# Patient Record
Sex: FEMALE | Race: BLACK OR AFRICAN AMERICAN | NOT HISPANIC OR LATINO | URBAN - METROPOLITAN AREA
[De-identification: names, ages, dates, MRNs, and addresses within clinical notes are randomized per-mention and may not be internally consistent; named-entity substitution may affect disease eponyms.]

---

## 2022-11-05 ENCOUNTER — EMERGENCY (EMERGENCY)
Facility: HOSPITAL | Age: 67
LOS: 0 days | Discharge: HOME | End: 2022-11-05
Attending: EMERGENCY MEDICINE
Payer: MEDICARE

## 2022-11-05 VITALS
HEART RATE: 65 BPM | SYSTOLIC BLOOD PRESSURE: 105 MMHG | DIASTOLIC BLOOD PRESSURE: 57 MMHG | RESPIRATION RATE: 20 BRPM | OXYGEN SATURATION: 100 %

## 2022-11-05 VITALS
TEMPERATURE: 99 F | OXYGEN SATURATION: 93 % | RESPIRATION RATE: 18 BRPM | DIASTOLIC BLOOD PRESSURE: 64 MMHG | HEART RATE: 64 BPM | SYSTOLIC BLOOD PRESSURE: 109 MMHG

## 2022-11-05 DIAGNOSIS — J44.9 CHRONIC OBSTRUCTIVE PULMONARY DISEASE, UNSPECIFIED: ICD-10-CM

## 2022-11-05 DIAGNOSIS — R07.0 PAIN IN THROAT: ICD-10-CM

## 2022-11-05 DIAGNOSIS — I50.9 HEART FAILURE, UNSPECIFIED: ICD-10-CM

## 2022-11-05 DIAGNOSIS — R07.9 CHEST PAIN, UNSPECIFIED: ICD-10-CM

## 2022-11-05 DIAGNOSIS — I13.0 HYPERTENSIVE HEART AND CHRONIC KIDNEY DISEASE WITH HEART FAILURE AND STAGE 1 THROUGH STAGE 4 CHRONIC KIDNEY DISEASE, OR UNSPECIFIED CHRONIC KIDNEY DISEASE: ICD-10-CM

## 2022-11-05 DIAGNOSIS — R07.89 OTHER CHEST PAIN: ICD-10-CM

## 2022-11-05 DIAGNOSIS — E11.22 TYPE 2 DIABETES MELLITUS WITH DIABETIC CHRONIC KIDNEY DISEASE: ICD-10-CM

## 2022-11-05 DIAGNOSIS — Z95.4 PRESENCE OF OTHER HEART-VALVE REPLACEMENT: ICD-10-CM

## 2022-11-05 DIAGNOSIS — R10.9 UNSPECIFIED ABDOMINAL PAIN: ICD-10-CM

## 2022-11-05 DIAGNOSIS — D63.1 ANEMIA IN CHRONIC KIDNEY DISEASE: ICD-10-CM

## 2022-11-05 DIAGNOSIS — Z99.2 DEPENDENCE ON RENAL DIALYSIS: ICD-10-CM

## 2022-11-05 DIAGNOSIS — K52.9 NONINFECTIVE GASTROENTERITIS AND COLITIS, UNSPECIFIED: ICD-10-CM

## 2022-11-05 DIAGNOSIS — N18.9 CHRONIC KIDNEY DISEASE, UNSPECIFIED: ICD-10-CM

## 2022-11-05 DIAGNOSIS — E78.5 HYPERLIPIDEMIA, UNSPECIFIED: ICD-10-CM

## 2022-11-05 DIAGNOSIS — Z99.81 DEPENDENCE ON SUPPLEMENTAL OXYGEN: ICD-10-CM

## 2022-11-05 LAB
ALBUMIN SERPL ELPH-MCNC: 2.9 G/DL — LOW (ref 3.3–5)
ALP SERPL-CCNC: 61 U/L — SIGNIFICANT CHANGE UP (ref 40–120)
ALT FLD-CCNC: 53 U/L — SIGNIFICANT CHANGE UP (ref 12–78)
ANION GAP SERPL CALC-SCNC: 4 MMOL/L — LOW (ref 5–17)
AST SERPL-CCNC: 57 U/L — HIGH (ref 15–37)
BASOPHILS # BLD AUTO: 0 K/UL — SIGNIFICANT CHANGE UP (ref 0–0.2)
BASOPHILS NFR BLD AUTO: 0 % — SIGNIFICANT CHANGE UP (ref 0–2)
BILIRUB SERPL-MCNC: 0.6 MG/DL — SIGNIFICANT CHANGE UP (ref 0.2–1.2)
BUN SERPL-MCNC: 35 MG/DL — HIGH (ref 7–23)
CALCIUM SERPL-MCNC: 8 MG/DL — LOW (ref 8.5–10.1)
CHLORIDE SERPL-SCNC: 104 MMOL/L — SIGNIFICANT CHANGE UP (ref 96–108)
CO2 SERPL-SCNC: 29 MMOL/L — SIGNIFICANT CHANGE UP (ref 22–31)
CREAT SERPL-MCNC: 4.62 MG/DL — HIGH (ref 0.5–1.3)
EGFR: 10 ML/MIN/1.73M2 — LOW
EOSINOPHIL # BLD AUTO: 0 K/UL — SIGNIFICANT CHANGE UP (ref 0–0.5)
EOSINOPHIL NFR BLD AUTO: 0 % — SIGNIFICANT CHANGE UP (ref 0–6)
GLUCOSE SERPL-MCNC: 175 MG/DL — HIGH (ref 70–99)
HCT VFR BLD CALC: 31 % — LOW (ref 34.5–45)
HGB BLD-MCNC: 9.6 G/DL — LOW (ref 11.5–15.5)
LIDOCAIN IGE QN: 79 U/L — SIGNIFICANT CHANGE UP (ref 73–393)
LYMPHOCYTES # BLD AUTO: 1.62 K/UL — SIGNIFICANT CHANGE UP (ref 1–3.3)
LYMPHOCYTES # BLD AUTO: 19 % — SIGNIFICANT CHANGE UP (ref 13–44)
MAGNESIUM SERPL-MCNC: 2.2 MG/DL — SIGNIFICANT CHANGE UP (ref 1.6–2.6)
MCHC RBC-ENTMCNC: 30.5 PG — SIGNIFICANT CHANGE UP (ref 27–34)
MCHC RBC-ENTMCNC: 31 GM/DL — LOW (ref 32–36)
MCV RBC AUTO: 98.4 FL — SIGNIFICANT CHANGE UP (ref 80–100)
MONOCYTES # BLD AUTO: 0.86 K/UL — SIGNIFICANT CHANGE UP (ref 0–0.9)
MONOCYTES NFR BLD AUTO: 10 % — SIGNIFICANT CHANGE UP (ref 2–14)
NEUTROPHILS # BLD AUTO: 6.07 K/UL — SIGNIFICANT CHANGE UP (ref 1.8–7.4)
NEUTROPHILS NFR BLD AUTO: 64 % — SIGNIFICANT CHANGE UP (ref 43–77)
NRBC # BLD: SIGNIFICANT CHANGE UP /100 WBCS (ref 0–0)
NT-PROBNP SERPL-SCNC: HIGH PG/ML (ref 0–125)
PLATELET # BLD AUTO: 216 K/UL — SIGNIFICANT CHANGE UP (ref 150–400)
POTASSIUM SERPL-MCNC: 4.7 MMOL/L — SIGNIFICANT CHANGE UP (ref 3.5–5.3)
POTASSIUM SERPL-SCNC: 4.7 MMOL/L — SIGNIFICANT CHANGE UP (ref 3.5–5.3)
PROT SERPL-MCNC: 7.7 GM/DL — SIGNIFICANT CHANGE UP (ref 6–8.3)
RBC # BLD: 3.15 M/UL — LOW (ref 3.8–5.2)
RBC # FLD: 19 % — HIGH (ref 10.3–14.5)
SODIUM SERPL-SCNC: 137 MMOL/L — SIGNIFICANT CHANGE UP (ref 135–145)
TROPONIN I, HIGH SENSITIVITY RESULT: 18.27 NG/L — SIGNIFICANT CHANGE UP
TROPONIN I, HIGH SENSITIVITY RESULT: 24.83 NG/L — SIGNIFICANT CHANGE UP
WBC # BLD: 8.55 K/UL — SIGNIFICANT CHANGE UP (ref 3.8–10.5)
WBC # FLD AUTO: 8.55 K/UL — SIGNIFICANT CHANGE UP (ref 3.8–10.5)

## 2022-11-05 PROCEDURE — 36415 COLL VENOUS BLD VENIPUNCTURE: CPT

## 2022-11-05 PROCEDURE — 84484 ASSAY OF TROPONIN QUANT: CPT

## 2022-11-05 PROCEDURE — 93010 ELECTROCARDIOGRAM REPORT: CPT

## 2022-11-05 PROCEDURE — 99285 EMERGENCY DEPT VISIT HI MDM: CPT

## 2022-11-05 PROCEDURE — 83880 ASSAY OF NATRIURETIC PEPTIDE: CPT

## 2022-11-05 PROCEDURE — 71045 X-RAY EXAM CHEST 1 VIEW: CPT

## 2022-11-05 PROCEDURE — 83690 ASSAY OF LIPASE: CPT

## 2022-11-05 PROCEDURE — 71045 X-RAY EXAM CHEST 1 VIEW: CPT | Mod: 26

## 2022-11-05 PROCEDURE — 93005 ELECTROCARDIOGRAM TRACING: CPT

## 2022-11-05 PROCEDURE — 80053 COMPREHEN METABOLIC PANEL: CPT

## 2022-11-05 PROCEDURE — 85025 COMPLETE CBC W/AUTO DIFF WBC: CPT

## 2022-11-05 PROCEDURE — 99285 EMERGENCY DEPT VISIT HI MDM: CPT | Mod: 25

## 2022-11-05 PROCEDURE — 83735 ASSAY OF MAGNESIUM: CPT

## 2022-11-05 RX ORDER — LIDOCAINE 4 G/100G
10 CREAM TOPICAL ONCE
Refills: 0 | Status: COMPLETED | OUTPATIENT
Start: 2022-11-05 | End: 2022-11-05

## 2022-11-05 RX ORDER — FAMOTIDINE 10 MG/ML
20 INJECTION INTRAVENOUS ONCE
Refills: 0 | Status: COMPLETED | OUTPATIENT
Start: 2022-11-05 | End: 2022-11-05

## 2022-11-05 RX ADMIN — Medication 30 MILLILITER(S): at 15:12

## 2022-11-05 RX ADMIN — FAMOTIDINE 20 MILLIGRAM(S): 10 INJECTION INTRAVENOUS at 15:11

## 2022-11-05 RX ADMIN — LIDOCAINE 10 MILLILITER(S): 4 CREAM TOPICAL at 15:12

## 2022-11-05 NOTE — ED ADULT TRIAGE NOTE - CHIEF COMPLAINT QUOTE
Pt arrives to ED from [facility] re[facility] complaining of chest pain. Pt s/p PPM placement three days ago.

## 2022-11-05 NOTE — ED PROVIDER NOTE - NSICDXPASTMEDICALHX_GEN_ALL_CORE_FT
PAST MEDICAL HISTORY:  Asthma with chronic obstructive pulmonary disease (COPD)     CHF (congestive heart failure)     Diabetes     HLD (hyperlipidemia)     HTN (hypertension)

## 2022-11-05 NOTE — ED PROVIDER NOTE - OBJECTIVE STATEMENT
65 yo female w/PMHx of CHF, DM2 on insulin, CKD on dialysis M,W,F, HTN, HLD, COPD on 2L home O2, anemia last transfusion on Wednesday, PPM placement x3days ago presents to the ED [facility]ERT[facility] from [facility] c/o CP. Pt c/o burning throat pain, left sided CP and diffuse burning abd pain. Pt states that the CP is intermittent and feeling like pressure. Pt states that the pain started after eating breakfast and went to [place] ED. Pt has no nausea, vomiting. Pt is lightheaded.

## 2022-11-05 NOTE — ED ADULT NURSE NOTE - NSIMPLEMENTINTERV_GEN_ALL_ED
Implemented All Fall Risk Interventions:  [place] to call system. Call bell, personal items and telephone within reach. Instruct patient to call for assistance. Room bathroom lighting operational. Non-slip footwear when patient is off stretcher. Physically safe environment: no spills, clutter or unnecessary equipment. Stretcher in lowest position, wheels locked, appropriate side rails in place. Provide visual cue, wrist band, yellow gown, etc. Monitor gait and stability. Monitor for mental status changes and reorient to person, place, and time. Review medications for side effects contributing to fall risk. Reinforce activity limits and safety measures with patient and family.

## 2022-11-05 NOTE — ED ADULT NURSE NOTE - OBJECTIVE STATEMENT
Pt BIBEMS c/o chest pain. per EMS pt s/p PPM 3 days ago. Denies chest pain at present. HX HD [id number] last was friday. Permacath noted to right chest wall. Hx COPD and asthma on 2L O2 at home

## 2022-11-05 NOTE — ED ADULT NURSE NOTE - CHIEF COMPLAINT QUOTE
Pt arrives to ED from [facility] rehab complaining of chest pain. Pt s/p PPM placement three days ago.

## 2022-11-05 NOTE — ED PROVIDER NOTE - NS ED ROS FT
Constitutional: No fevers, chills, or sweats.   HEENT: +throat pain   Cardiac: +chest pain, no exertional dyspnea, orthopnea  Respiratory: No shortness of breath, no cough  GI: +abdominal pain, no N/V/D, +constipation   Neuro: No headaches, no neck pain/stiffness, no numbness  All other systems reviewed and are negative unless otherwise stated in the HPI.

## 2022-11-05 NOTE — ED PROVIDER NOTE - PROGRESS NOTE DETAILS
Received signout from Dr. Echevarria. Await second trop. He does not suspect ischemia. Second trop neg. Will d/c with precautions. Marlys FUNEZ

## 2022-11-05 NOTE — ED PROVIDER NOTE - PHYSICAL EXAMINATION
General: AAOx3, NAD  HEENT: NCAT  Cardiac: Normal rate and rhythm, systolic murmur, normal peripheral perfusion  Respiratory: Normal rate and effort. CTAB  GI: Soft, nondistended, epigastric and LLQ ttp   Neuro: No focal deficits. LO equally x4, sensation to light touch intact throughout  MSK: FROMx4, no focal bony tenderness, no peripheral edema  Skin: No rash

## 2022-11-05 NOTE — ED PROVIDER NOTE - CLINICAL SUMMARY MEDICAL DECISION MAKING FREE TEXT BOX
Plan for EKG, labs, CXR, trial of gastrits medication given onset of symptoms after eating today.   NSR with sinus arrythmia at 62 bpm. Normal axis, normal intervals, no acute ischemic changes.

## 2022-11-05 NOTE — ED PROVIDER NOTE - PATIENT PORTAL LINK FT
You can access the FollowMyHealth Patient Portal offered by [facility] by registering at the following website: [email or web address]. By joining [employer]’s FollowMyHealth portal, you will also be able to view your health information using other applications (apps) compatible with our system.

## 2022-12-03 RX ORDER — SODIUM ZIRCONIUM CYCLOSILICATE 10 G/10G
1 POWDER, FOR SUSPENSION ORAL
Qty: 0 | Refills: 0 | DISCHARGE
Start: 2022-12-03

## 2022-12-04 RX ORDER — VANCOMYCIN HCL 1 G
1 VIAL (EA) INTRAVENOUS
Qty: 0 | Refills: 0 | DISCHARGE
Start: 2022-12-04

## 2022-12-05 ENCOUNTER — INPATIENT (INPATIENT)
Facility: HOSPITAL | Age: 67
LOS: 3 days | Discharge: HOME | DRG: 377 | End: 2022-12-09
Attending: HOSPITALIST | Admitting: INTERNAL MEDICINE
Payer: MEDICARE

## 2022-12-05 VITALS
DIASTOLIC BLOOD PRESSURE: 52 MMHG | TEMPERATURE: 98 F | OXYGEN SATURATION: 95 % | WEIGHT: 293 LBS | HEART RATE: 66 BPM | RESPIRATION RATE: 18 BRPM | HEIGHT: 65 IN | SYSTOLIC BLOOD PRESSURE: 97 MMHG

## 2022-12-05 DIAGNOSIS — K92.1 MELENA: ICD-10-CM

## 2022-12-05 PROBLEM — E78.5 HYPERLIPIDEMIA, UNSPECIFIED: Chronic | Status: ACTIVE | Noted: 2022-11-11

## 2022-12-05 PROBLEM — E11.9 TYPE 2 DIABETES MELLITUS WITHOUT COMPLICATIONS: Chronic | Status: ACTIVE | Noted: 2022-11-11

## 2022-12-05 PROBLEM — J44.9 CHRONIC OBSTRUCTIVE PULMONARY DISEASE, UNSPECIFIED: Chronic | Status: ACTIVE | Noted: 2022-11-11

## 2022-12-05 PROBLEM — I10 ESSENTIAL (PRIMARY) HYPERTENSION: Chronic | Status: ACTIVE | Noted: 2022-11-11

## 2022-12-05 PROBLEM — I50.9 HEART FAILURE, UNSPECIFIED: Chronic | Status: ACTIVE | Noted: 2022-11-11

## 2022-12-05 LAB
ALBUMIN SERPL ELPH-MCNC: 2.2 G/DL — LOW (ref 3.3–5)
ALP SERPL-CCNC: 52 U/L — SIGNIFICANT CHANGE UP (ref 40–120)
ALT FLD-CCNC: 24 U/L — SIGNIFICANT CHANGE UP (ref 12–78)
ANION GAP SERPL CALC-SCNC: 3 MMOL/L — LOW (ref 5–17)
APTT BLD: 32.7 SEC — SIGNIFICANT CHANGE UP (ref 27.5–35.5)
AST SERPL-CCNC: 27 U/L — SIGNIFICANT CHANGE UP (ref 15–37)
BASE EXCESS BLDA CALC-SCNC: -2.2 MMOL/L — LOW (ref -2–3)
BASOPHILS # BLD AUTO: 0.02 K/UL — SIGNIFICANT CHANGE UP (ref 0–0.2)
BASOPHILS NFR BLD AUTO: 0.3 % — SIGNIFICANT CHANGE UP (ref 0–2)
BILIRUB SERPL-MCNC: 0.5 MG/DL — SIGNIFICANT CHANGE UP (ref 0.2–1.2)
BLOOD GAS COMMENTS ARTERIAL: SIGNIFICANT CHANGE UP
BUN SERPL-MCNC: 21 MG/DL — SIGNIFICANT CHANGE UP (ref 7–23)
CALCIUM SERPL-MCNC: 8.5 MG/DL — SIGNIFICANT CHANGE UP (ref 8.5–10.1)
CHLORIDE SERPL-SCNC: 103 MMOL/L — SIGNIFICANT CHANGE UP (ref 96–108)
CO2 BLDA-SCNC: 29 MMOL/L — HIGH (ref 19–24)
CO2 SERPL-SCNC: 30 MMOL/L — SIGNIFICANT CHANGE UP (ref 22–31)
CREAT SERPL-MCNC: 3.69 MG/DL — HIGH (ref 0.5–1.3)
EGFR: 13 ML/MIN/1.73M2 — LOW
EOSINOPHIL # BLD AUTO: 0.1 K/UL — SIGNIFICANT CHANGE UP (ref 0–0.5)
EOSINOPHIL NFR BLD AUTO: 1.3 % — SIGNIFICANT CHANGE UP (ref 0–6)
GLUCOSE SERPL-MCNC: 159 MG/DL — HIGH (ref 70–99)
HCO3 BLDA-SCNC: 27 MMOL/L — SIGNIFICANT CHANGE UP (ref 21–28)
HCT VFR BLD CALC: 23.5 % — LOW (ref 34.5–45)
HGB BLD-MCNC: 7.1 G/DL — LOW (ref 11.5–15.5)
IMM GRANULOCYTES NFR BLD AUTO: 0.6 % — SIGNIFICANT CHANGE UP (ref 0–0.9)
INR BLD: 1.09 RATIO — SIGNIFICANT CHANGE UP (ref 0.88–1.16)
LACTATE SERPL-SCNC: 0.5 MMOL/L — LOW (ref 0.7–2)
LYMPHOCYTES # BLD AUTO: 1.22 K/UL — SIGNIFICANT CHANGE UP (ref 1–3.3)
LYMPHOCYTES # BLD AUTO: 15.6 % — SIGNIFICANT CHANGE UP (ref 13–44)
MCHC RBC-ENTMCNC: 30.1 PG — SIGNIFICANT CHANGE UP (ref 27–34)
MCHC RBC-ENTMCNC: 30.2 GM/DL — LOW (ref 32–36)
MCV RBC AUTO: 99.6 FL — SIGNIFICANT CHANGE UP (ref 80–100)
MONOCYTES # BLD AUTO: 0.72 K/UL — SIGNIFICANT CHANGE UP (ref 0–0.9)
MONOCYTES NFR BLD AUTO: 9.2 % — SIGNIFICANT CHANGE UP (ref 2–14)
NEUTROPHILS # BLD AUTO: 5.7 K/UL — SIGNIFICANT CHANGE UP (ref 1.8–7.4)
NEUTROPHILS NFR BLD AUTO: 73 % — SIGNIFICANT CHANGE UP (ref 43–77)
PCO2 BLDA: 68 MMHG — HIGH (ref 32–45)
PH BLDA: 7.21 — LOW (ref 7.35–7.45)
PLATELET # BLD AUTO: 143 K/UL — LOW (ref 150–400)
PO2 BLDA: 122 MMHG — HIGH (ref 83–108)
POTASSIUM SERPL-MCNC: 4 MMOL/L — SIGNIFICANT CHANGE UP (ref 3.5–5.3)
POTASSIUM SERPL-SCNC: 4 MMOL/L — SIGNIFICANT CHANGE UP (ref 3.5–5.3)
PROT SERPL-MCNC: 7.8 GM/DL — SIGNIFICANT CHANGE UP (ref 6–8.3)
PROTHROM AB SERPL-ACNC: 12.6 SEC — SIGNIFICANT CHANGE UP (ref 10.5–13.4)
RAPID RVP RESULT: SIGNIFICANT CHANGE UP
RBC # BLD: 2.36 M/UL — LOW (ref 3.8–5.2)
RBC # FLD: 18.2 % — HIGH (ref 10.3–14.5)
SAO2 % BLDA: 99 % — SIGNIFICANT CHANGE UP (ref 94–98)
SARS-COV-2 RNA SPEC QL NAA+PROBE: SIGNIFICANT CHANGE UP
SODIUM SERPL-SCNC: 136 MMOL/L — SIGNIFICANT CHANGE UP (ref 135–145)
TROPONIN I, HIGH SENSITIVITY RESULT: 32.46 NG/L — SIGNIFICANT CHANGE UP
WBC # BLD: 7.81 K/UL — SIGNIFICANT CHANGE UP (ref 3.8–10.5)
WBC # FLD AUTO: 7.81 K/UL — SIGNIFICANT CHANGE UP (ref 3.8–10.5)

## 2022-12-05 PROCEDURE — 82728 ASSAY OF FERRITIN: CPT

## 2022-12-05 PROCEDURE — 99291 CRITICAL CARE FIRST HOUR: CPT

## 2022-12-05 PROCEDURE — 84443 ASSAY THYROID STIM HORMONE: CPT

## 2022-12-05 PROCEDURE — 71045 X-RAY EXAM CHEST 1 VIEW: CPT | Mod: 26

## 2022-12-05 PROCEDURE — 74176 CT ABD & PELVIS W/O CONTRAST: CPT | Mod: 26,59,MD

## 2022-12-05 PROCEDURE — 97530 THERAPEUTIC ACTIVITIES: CPT | Mod: GP

## 2022-12-05 PROCEDURE — 74177 CT ABD & PELVIS W/CONTRAST: CPT | Mod: 26,MD

## 2022-12-05 PROCEDURE — P9016: CPT

## 2022-12-05 PROCEDURE — 83540 ASSAY OF IRON: CPT

## 2022-12-05 PROCEDURE — G0508: CPT | Mod: 95

## 2022-12-05 PROCEDURE — 83036 HEMOGLOBIN GLYCOSYLATED A1C: CPT

## 2022-12-05 PROCEDURE — 94660 CPAP INITIATION&MGMT: CPT

## 2022-12-05 PROCEDURE — 85025 COMPLETE CBC W/AUTO DIFF WBC: CPT

## 2022-12-05 PROCEDURE — 36415 COLL VENOUS BLD VENIPUNCTURE: CPT

## 2022-12-05 PROCEDURE — 97162 PT EVAL MOD COMPLEX 30 MIN: CPT | Mod: GP

## 2022-12-05 PROCEDURE — 84100 ASSAY OF PHOSPHORUS: CPT

## 2022-12-05 PROCEDURE — 97116 GAIT TRAINING THERAPY: CPT | Mod: GP

## 2022-12-05 PROCEDURE — 82962 GLUCOSE BLOOD TEST: CPT

## 2022-12-05 PROCEDURE — 94640 AIRWAY INHALATION TREATMENT: CPT

## 2022-12-05 PROCEDURE — 86706 HEP B SURFACE ANTIBODY: CPT

## 2022-12-05 PROCEDURE — 80048 BASIC METABOLIC PNL TOTAL CA: CPT

## 2022-12-05 PROCEDURE — C9113: CPT

## 2022-12-05 PROCEDURE — 86920 COMPATIBILITY TEST SPIN: CPT

## 2022-12-05 PROCEDURE — 86803 HEPATITIS C AB TEST: CPT

## 2022-12-05 PROCEDURE — 93005 ELECTROCARDIOGRAM TRACING: CPT

## 2022-12-05 PROCEDURE — 83735 ASSAY OF MAGNESIUM: CPT

## 2022-12-05 PROCEDURE — 90935 HEMODIALYSIS ONE EVALUATION: CPT

## 2022-12-05 PROCEDURE — 83550 IRON BINDING TEST: CPT

## 2022-12-05 PROCEDURE — 36430 TRANSFUSION BLD/BLD COMPNT: CPT

## 2022-12-05 PROCEDURE — 87521 HEPATITIS C PROBE&RVRS TRNSC: CPT

## 2022-12-05 PROCEDURE — 85027 COMPLETE CBC AUTOMATED: CPT

## 2022-12-05 RX ORDER — CEFTRIAXONE 500 MG/1
1000 INJECTION, POWDER, FOR SOLUTION INTRAMUSCULAR; INTRAVENOUS EVERY 24 HOURS
Refills: 0 | Status: DISCONTINUED | OUTPATIENT
Start: 2022-12-05 | End: 2022-12-05

## 2022-12-05 RX ORDER — CEFEPIME 1 G/1
INJECTION, POWDER, FOR SOLUTION INTRAMUSCULAR; INTRAVENOUS
Refills: 0 | Status: DISCONTINUED | OUTPATIENT
Start: 2022-12-05 | End: 2022-12-05

## 2022-12-05 RX ORDER — PANTOPRAZOLE SODIUM 20 MG/1
40 TABLET, DELAYED RELEASE ORAL ONCE
Refills: 0 | Status: DISCONTINUED | OUTPATIENT
Start: 2022-12-05 | End: 2022-12-06

## 2022-12-05 RX ORDER — PANTOPRAZOLE SODIUM 20 MG/1
40 TABLET, DELAYED RELEASE ORAL ONCE
Refills: 0 | Status: COMPLETED | OUTPATIENT
Start: 2022-12-05 | End: 2022-12-05

## 2022-12-05 RX ORDER — CEFEPIME 1 G/1
1000 INJECTION, POWDER, FOR SOLUTION INTRAMUSCULAR; INTRAVENOUS ONCE
Refills: 0 | Status: DISCONTINUED | OUTPATIENT
Start: 2022-12-05 | End: 2022-12-05

## 2022-12-05 RX ORDER — CEFEPIME 1 G/1
INJECTION, POWDER, FOR SOLUTION INTRAMUSCULAR; INTRAVENOUS
Refills: 0 | Status: DISCONTINUED | OUTPATIENT
Start: 2022-12-06 | End: 2022-12-09

## 2022-12-05 RX ORDER — DIPHENHYDRAMINE HCL 50 MG
25 CAPSULE ORAL ONCE
Refills: 0 | Status: COMPLETED | OUTPATIENT
Start: 2022-12-05 | End: 2022-12-05

## 2022-12-05 RX ORDER — CEFEPIME 1 G/1
1000 INJECTION, POWDER, FOR SOLUTION INTRAMUSCULAR; INTRAVENOUS EVERY 12 HOURS
Refills: 0 | Status: DISCONTINUED | OUTPATIENT
Start: 2022-12-06 | End: 2022-12-09

## 2022-12-05 RX ORDER — IPRATROPIUM BROMIDE 0.2 MG/ML
500 SOLUTION, NON-ORAL INHALATION EVERY 6 HOURS
Refills: 0 | Status: DISCONTINUED | OUTPATIENT
Start: 2022-12-05 | End: 2022-12-06

## 2022-12-05 RX ORDER — CEFEPIME 1 G/1
1000 INJECTION, POWDER, FOR SOLUTION INTRAMUSCULAR; INTRAVENOUS ONCE
Refills: 0 | Status: COMPLETED | OUTPATIENT
Start: 2022-12-05 | End: 2022-12-06

## 2022-12-05 RX ORDER — CHLORHEXIDINE GLUCONATE 213 G/1000ML
1 SOLUTION TOPICAL
Refills: 0 | Status: DISCONTINUED | OUTPATIENT
Start: 2022-12-05 | End: 2022-12-09

## 2022-12-05 RX ORDER — AZITHROMYCIN 500 MG/1
500 TABLET, FILM COATED ORAL EVERY 24 HOURS
Refills: 0 | Status: DISCONTINUED | OUTPATIENT
Start: 2022-12-05 | End: 2022-12-05

## 2022-12-05 RX ORDER — DARBEPOETIN ALFA IN POLYSORBAT 200MCG/0.4
60 PEN INJECTOR (ML) SUBCUTANEOUS
Qty: 0 | Refills: 0 | DISCHARGE
Start: 2022-12-05

## 2022-12-05 RX ORDER — IPRATROPIUM/ALBUTEROL SULFATE 18-103MCG
3 AEROSOL WITH ADAPTER (GRAM) INHALATION EVERY 6 HOURS
Refills: 0 | Status: DISCONTINUED | OUTPATIENT
Start: 2022-12-05 | End: 2022-12-06

## 2022-12-05 RX ADMIN — PANTOPRAZOLE SODIUM 40 MILLIGRAM(S): 20 TABLET, DELAYED RELEASE ORAL at 17:48

## 2022-12-05 RX ADMIN — Medication 25 MILLIGRAM(S): at 19:00

## 2022-12-05 NOTE — ED PROVIDER NOTE - CLINICAL SUMMARY MEDICAL DECISION MAKING FREE TEXT BOX
Presents with blood in stool, multiple comorbidities, plan to do CT of abd, labs, admission Presents with blood in stool, multiple comorbidities, plan to do CT of abd, labs, admission    Terri FUNEZ: Patient with anemia, hematochezia, CT shows evidence of blood in rectum, colorectal consult, discussion with pt/family, inpatient admission and care.

## 2022-12-05 NOTE — ED PROVIDER NOTE - NEUROLOGICAL, MLM
Alert and oriented, no focal deficits, no motor or sensory deficits. Alert and oriented, no focal deficits, no motor or sensory deficits. Baseline neuro findings.

## 2022-12-05 NOTE — ED PROVIDER NOTE - PROGRESS NOTE DETAILS
Erica Medley for Dr. Murray:  Updated HCP and daughter agreeable for transfusion. Awaiting callback from nephology. Spoke to radiology, states there is blood in rectum. Spoke to colorectal via surgical resident. Colorectal consult is appreciated. Erica Medley for Dr. Murray: pt now with slight amount of blood in rectal area, dahlia in nature. Pt has a MAP around 53. Evaluated pt with surgery present. Given MAP and presence of blood, will start 1 unit of uncrossed blood transfusion. Terri FUNEZ: Spoke with Dr Roberts. Nephrology's timely call back is appreciated. Nephrology will follow up inpatient for dialysis to get IV contrast out. Erica Medley for Dr. Murray:  Updated HCP and daughter agreeable for transfusion. Awaiting Spoke to radiology, states there is blood in rectum. Spoke to colorectal via surgical resident. Colorectal consult is appreciated. Erica Medley for Dr. Murray:  Updated HCP and daughter agreeable for transfusion. Spoke to radiology, states there is blood in rectum. Spoke to colorectal via surgical resident. Colorectal consult is appreciated. Terri FUNEZ: Spoke with Dr Roberts. Nephrology's timely call back is appreciated. Nephrology will follow up inpatient for dialysis to get IV contrast out post CT.

## 2022-12-05 NOTE — H&P ADULT - ASSESSMENT
Assessment   68 y/o F with pmhx of HTN, DM2, COPD ( on 3 L home o2), CAD, HLD, pancreatitis, ESRD dialysis ( M/W/F), questionable communication deficits at baseline ? presented with bloody stools from [facility]. Pt was at her dialysis session when she went to the bathroom and saw blood in her stool. Denies hematemesis, melana, hx of alcohol abuse.  In ED was noted to be hypotensive to systolics in the 90's, Hb 7.1, lactate .5. Given 1 unit of PRBC. CT abd/pelvis showed moderate distension of rectum and rectosigmoid colon with high attenuation material with concern for bleeding. CTA showed no active bleeding. Colorectal surgery and nephrology were consulted by ED. No active bleeding, HD stable after blood transfusion, will trend CBC. Lethargic on exam, stat ABG ordered which showed acute on chronic hypoxic and hypercapnic RF with 7.21/ 68/122/27 likely 2/2 possible PNA seen on CT scan. Started on BIPAP, cefepime as nursing home resident. Pt is HD stable, non tachycardic, shock index < .5 with no active bleeding and will be accepted to SICU for active management of   1. LGIB  2. Acute on chronic Hypoxic/Hypercapnic RF  3. Cannot exclude PNA  4. ESRD on HD    Plan  Neuro: Lethargic 2/2 to hypercapnia, placed on BIPAP. No other active issues  Cardio: HD stable, maintain MAP > 65. Will hold home anti-hypertensives in setting of recent hypotension. Restart home midodrine 5 mg TID  Pulm: Hypercapnic with ABG showing 7.21/68/122/27. Placed on BIPAP, actively titrating Fio2 to maintain Spo2 88-96 %. Duonebs q 6 for COPD component.   GI: NPO at this time. IV PPI prophylaxis  Renal: ESRD on HD M/W/F. Scr 3.69, will trend Scr and lytes and replete as needed. Nephro consult. No indication for urgent HD at this time. Monitor fluid status. Avoid nephrotoxic agents  ID: WBC normal, afebrile. Possible PNA on CT scan, nursing home resident will cover with cefepime. Procal level pending. F/u Bcx, sputum cx.   Heme: Hb 7.1 s/p 1 unit, repeat Hb pending. Trend CBC 4 hours. Goal Hb > 7. Colorectal surgery consult  Endo: Restart home lantus 7 units in AM. ISS. Goal -180  Dispo; Admitted to SICU for treatment of LGIB and acute on chronic RF      Case Discussed with eICU attending Dr Arthur  Assessment   66 y/o F with pmhx of HTN, DM2, COPD ( on 3 L home o2), CAD, HLD, pancreatitis, ESRD dialysis ( M/W/F), questionable communication deficits at baseline ? presented with bloody stools from [facility]. Pt was at her dialysis session when she went to the bathroom and saw blood in her stool. Denies hematemesis, melana, hx of alcohol abuse.  In ED was noted to be hypotensive to systolics in the 90's, Hb 7.1, lactate .5. Given 1 unit of PRBC. CT abd/pelvis showed moderate distension of rectum and rectosigmoid colon with high attenuation material with concern for bleeding. CTA showed no active bleeding. Colorectal surgery and nephrology were consulted by ED. No active bleeding, HD stable after blood transfusion, will trend CBC. Lethargic on exam, stat ABG ordered which showed acute on chronic hypoxic and hypercapnic RF with 7.21/ 68/122/27 likely 2/2 possible PNA seen on CT scan. Started on BIPAP, cefepime as nursing home resident. Pt is HD stable, non tachycardic, shock index < .5 with no active bleeding and will be accepted to SICU for active management of   1. LGIB  2. Acute on chronic Hypoxic/Hypercapnic RF  3. Cannot exclude PNA  4. ESRD on HD    Plan  Neuro: Lethargic 2/2 to hypercapnia, placed on BIPAP. No other active issues  Cardio: HD stable, maintain MAP > 65. Will hold home anti-hypertensives in setting of recent hypotension. Restart home midodrine 5 mg TID  Pulm: Hypercapnic with ABG showing 7.21/68/122/27. Placed on BIPAP, actively titrating Fio2 to maintain Spo2 88-96 %. Duonebs q 6 for COPD component.   GI: NPO at this time. IV PPI prophylaxis  Renal: ESRD on HD M/W/F. Scr 3.69, will trend Scr and lytes and replete as needed. Nephro consult. No indication for urgent HD at this time. Monitor fluid status. Avoid nephrotoxic agents  ID: WBC normal, afebrile. Possible PNA on CT scan, nursing home resident will cover with cefepime. Procal level pending. F/u Bcx, sputum cx.   Heme: Hb 7.1 s/p 1 unit, repeat Hb pending. Trend CBC 4 hours. Goal Hb > 7. Colorectal surgery consult  Endo: Restart home lantus 7 units in AM. ISS. Goal -180  Dispo; Admitted to SICU for treatment of LGIB and acute on chronic RF      Critical Care time: 35 mins assessing presenting problems of acute illness that poses high probability of life threatening deterioration or end organ damage/dysfunction.  Medical decision making including Initiating plan of care, reviewing data, reviewing radiology, direct patient bedside evaluation and interpretation of vital signs, any necessary ventilator management , discussion with multidisciplinary team, discussing goals of care with patient/family, all non inclusive of procedures   Case Discussed with eICU attending Dr Arthur

## 2022-12-05 NOTE — H&P ADULT - HISTORY OF PRESENT ILLNESS
HPI:  66 y/o F with pmhx of HTN, DM2, COPD ( on 3 L home o2), CAD, HLD, pancreatitis, ESRD dialysis ( M/W/F), questionable communication deficits at baseline ? presented with bloody stools from [facility]. Pt was at her dialysis session when she went to the bathroom and saw blood in her stool. Denies hematemesis, melana, hx of alcohol abuse.  In ED was noted to be hypotensive to systolics in the 90's, Hb 7.1, lactate .5. Given 1 unit of PRBC. CT abd/pelvis showed moderate distension of rectum and rectosigmoid colon with high attentuation material with concern for bleeding. CTA showed no active bleeding. Colorectal surgery and nephrology were consulted by ED. HPI:  66 y/o F with pmhx of HTN, DM2, COPD ( on 3 L home o2), CAD, HLD, pancreatitis, ESRD dialysis ( M/W/F), questionable communication deficits at baseline ? presented with bloody stools from [facility]. Pt was at her dialysis session when she went to the bathroom and saw blood in her stool. Denies hematemesis, melana, hx of alcohol abuse.  In ED was noted to be hypotensive to systolics in the 90's, Hb 7.1, lactate .5. Coags wnl. Given 1 unit of PRBC. CT abd/pelvis showed moderate distension of rectum and rectosigmoid colon with high attentuation material with concern for bleeding. CTA showed no active bleeding. Colorectal surgery and nephrology were consulted by ED.

## 2022-12-05 NOTE — H&P ADULT - NSHPPHYSICALEXAM_GEN_ALL_CORE
PHYSICAL EXAM:    GENERAL: NAD  HEAD:  Atraumatic, Normocephalic  EYES: EOMI, PERRLA, conjunctiva and sclera clear  ENMT: No tonsillar erythema, exudates, or enlargement; Moist mucous membranes, Good dentition, No lesions  NECK: Supple, No JVD, Normal thyroid  NERVOUS SYSTEM:  A Ox4. Although lethargic, frequently closing eyes. Non-focal. Cn2-12 intact.   CHEST/LUNG: Clear to auscultation bilaterally; No rales, rhonchi, wheezing, or rubs  HEART: Regular rate and rhythm; No murmurs, rubs, or gallops  ABDOMEN: Soft, Nontender, Nondistended; Bowel sounds present  EXTREMITIES:  2+ Peripheral Pulses, No clubbing, cyanosis, or edema

## 2022-12-05 NOTE — ED ADULT TRIAGE NOTE - CHIEF COMPLAINT QUOTE
Pt BIBEMS with c/o blood in stool from nursing home. Per pt she was at dialysis today and when she returned to facility per staff she had rectal bleeding. Pt denies any abdominal pain, SOB, dizziness or CP.

## 2022-12-05 NOTE — ED PROVIDER NOTE - CRITICAL CARE ATTENDING CONTRIBUTION TO CARE
Patient with urgent need for intervention, hematochezia, decreased MAP, need for in ED transfusion  Discussion and interpretation of results/labs/imaging with patient/family  Discussion of goals of care with patient  Consultation with Colorectal, Nephrology  multiple assessments, repeat observations in the ED prior to admission  etc.

## 2022-12-05 NOTE — ED PROVIDER NOTE - DIFFERENTIAL DIAGNOSIS
Anemia secondary to hematochezia, differential includes GIB secondary to various causes to be determined, slight hypotension, hematochezia, transfusion, admission Differential Diagnosis

## 2022-12-05 NOTE — H&P ADULT - NSHPREVIEWOFSYSTEMS_GEN_ALL_CORE
CONSTITUTIONAL: No fever, weight loss, or fatigue  EYES: No eye pain, visual disturbances, or discharge  ENMT:  No difficulty hearing, tinnitus, vertigo; No sinus or throat pain  NECK: No pain or stiffness  BREASTS: No pain, masses, or nipple discharge  RESPIRATORY: No cough, wheezing, chills or hemoptysis; No shortness of breath  CARDIOVASCULAR: No chest pain, palpitations, dizziness, or leg swelling  GASTROINTESTINAL: + Hemtochezia. No abdominal or epigastric pain. No nausea, vomiting, or hematemesis; No diarrhea or constipation. No melena or  GENITOURINARY: No dysuria, frequency, hematuria, or incontinence  NEUROLOGICAL: No headaches, memory loss, loss of strength, numbness, or tremors  SKIN: No itching, burning, rashes, or lesions   LYMPH NODES: No enlarged glands  ENDOCRINE: No heat or cold intolerance; No hair loss  MUSCULOSKELETAL: No joint pain or swelling; No muscle, back, or extremity pain  PSYCHIATRIC: No depression, anxiety, mood swings, or difficulty sleeping  HEME/LYMPH: No easy bruising, or bleeding gums  ALLERGY AND IMMUNOLOGIC: No hives or eczema

## 2022-12-05 NOTE — ED PROVIDER NOTE - CONSIDERATION OF ADMISSION OBSERVATION
Patient with GI bleeding/hematochezia, high risk for worsening anemia, labs, transfusion, admission Consideration of Admission/Observation

## 2022-12-05 NOTE — ED PROVIDER NOTE - NS_ ATTENDINGSCRIBEDETAILS _ED_A_ED_FT
I Bony Murray MD saw and examined the patient. Scribe documented for me and under my supervision. I have modified the scribe's documentation where necessary to reflect my history, physical exam and other relevant documentations pertinent to the care of the patient.

## 2022-12-05 NOTE — PROVIDER CONTACT NOTE (EICU) - SITUATION
66 y/o female with PMHx sepsis, HTN, DMII, COPD, on 3L O2, CAD, HLD, morbid ob, pancreatitis, ESRD, dialysis, cognitive communication deficit. Dialysis MWF. had dialysis today. Complaining of bloody stool. Went to the bathroom and saw blood was in the stool.  Case discussed with the ICU PA.

## 2022-12-05 NOTE — ED PROVIDER NOTE - OBJECTIVE STATEMENT
68 y/o female with PMHx sepsis, HTN, DMII, COPD, on 3L O2, CAD, HLD, morbid ob, pancreatitis, ESRD, dialysis, cognitive communication deficit. Dialysis MWF. had dialysis today. Complaining of bloody stool. Went to the bathroom and saw blood was in the stool. Resident of [facility], Dr. Weeks, emergency contact [facility] at [phone].

## 2022-12-05 NOTE — PHARMACOTHERAPY INTERVENTION NOTE - COMMENTS
Medication reconciliation completed.  Reviewed Medication list and confirmed med allergies with list from Care Facility [facility]"; confirmed with Dr. First Med[place].

## 2022-12-05 NOTE — ED ADULT NURSE NOTE - OBJECTIVE STATEMENT
BIBEMS for [facility], bloody stool @ nursing home. pt underwent dialysis today, when she returned to facility per staff she had rectal bleeding. appreciates no c/o pain or SOB but endorsing some dizziness.

## 2022-12-06 LAB
ANION GAP SERPL CALC-SCNC: 5 MMOL/L — SIGNIFICANT CHANGE UP (ref 5–17)
BUN SERPL-MCNC: 29 MG/DL — HIGH (ref 7–23)
CALCIUM SERPL-MCNC: 8.4 MG/DL — LOW (ref 8.5–10.1)
CHLORIDE SERPL-SCNC: 103 MMOL/L — SIGNIFICANT CHANGE UP (ref 96–108)
CO2 SERPL-SCNC: 28 MMOL/L — SIGNIFICANT CHANGE UP (ref 22–31)
CREAT SERPL-MCNC: 4.27 MG/DL — HIGH (ref 0.5–1.3)
EGFR: 11 ML/MIN/1.73M2 — LOW
GLUCOSE BLDC GLUCOMTR-MCNC: 112 MG/DL — HIGH (ref 70–99)
GLUCOSE BLDC GLUCOMTR-MCNC: 128 MG/DL — HIGH (ref 70–99)
GLUCOSE BLDC GLUCOMTR-MCNC: 131 MG/DL — HIGH (ref 70–99)
GLUCOSE SERPL-MCNC: 132 MG/DL — HIGH (ref 70–99)
HAV IGM SER-ACNC: SIGNIFICANT CHANGE UP
HBV CORE IGM SER-ACNC: SIGNIFICANT CHANGE UP
HBV SURFACE AG SER-ACNC: SIGNIFICANT CHANGE UP
HCT VFR BLD CALC: 25.1 % — LOW (ref 34.5–45)
HCT VFR BLD CALC: 29.1 % — LOW (ref 34.5–45)
HCT VFR BLD CALC: 29.1 % — LOW (ref 34.5–45)
HCV AB S/CO SERPL IA: 1.58 S/CO — HIGH (ref 0–0.99)
HCV AB S/CO SERPL IA: 1.71 S/CO — HIGH (ref 0–0.99)
HCV AB SERPL-IMP: ABNORMAL
HCV AB SERPL-IMP: ABNORMAL
HGB BLD-MCNC: 7.4 G/DL — LOW (ref 11.5–15.5)
HGB BLD-MCNC: 8.7 G/DL — LOW (ref 11.5–15.5)
HGB BLD-MCNC: 9.2 G/DL — LOW (ref 11.5–15.5)
MAGNESIUM SERPL-MCNC: 2.4 MG/DL — SIGNIFICANT CHANGE UP (ref 1.6–2.6)
MCHC RBC-ENTMCNC: 29.1 PG — SIGNIFICANT CHANGE UP (ref 27–34)
MCHC RBC-ENTMCNC: 29.5 GM/DL — LOW (ref 32–36)
MCHC RBC-ENTMCNC: 29.5 PG — SIGNIFICANT CHANGE UP (ref 27–34)
MCHC RBC-ENTMCNC: 29.9 GM/DL — LOW (ref 32–36)
MCHC RBC-ENTMCNC: 29.9 PG — SIGNIFICANT CHANGE UP (ref 27–34)
MCHC RBC-ENTMCNC: 31.6 GM/DL — LOW (ref 32–36)
MCV RBC AUTO: 94.5 FL — SIGNIFICANT CHANGE UP (ref 80–100)
MCV RBC AUTO: 98.6 FL — SIGNIFICANT CHANGE UP (ref 80–100)
MCV RBC AUTO: 98.8 FL — SIGNIFICANT CHANGE UP (ref 80–100)
PLATELET # BLD AUTO: 152 K/UL — SIGNIFICANT CHANGE UP (ref 150–400)
PLATELET # BLD AUTO: 154 K/UL — SIGNIFICANT CHANGE UP (ref 150–400)
PLATELET # BLD AUTO: 155 K/UL — SIGNIFICANT CHANGE UP (ref 150–400)
POTASSIUM SERPL-MCNC: 4.4 MMOL/L — SIGNIFICANT CHANGE UP (ref 3.5–5.3)
POTASSIUM SERPL-SCNC: 4.4 MMOL/L — SIGNIFICANT CHANGE UP (ref 3.5–5.3)
RBC # BLD: 2.54 M/UL — LOW (ref 3.8–5.2)
RBC # BLD: 2.95 M/UL — LOW (ref 3.8–5.2)
RBC # BLD: 3.08 M/UL — LOW (ref 3.8–5.2)
RBC # FLD: 17.8 % — HIGH (ref 10.3–14.5)
RBC # FLD: 18 % — HIGH (ref 10.3–14.5)
RBC # FLD: 18.2 % — HIGH (ref 10.3–14.5)
SODIUM SERPL-SCNC: 136 MMOL/L — SIGNIFICANT CHANGE UP (ref 135–145)
TSH SERPL-MCNC: 2.54 UU/ML — SIGNIFICANT CHANGE UP (ref 0.34–4.82)
WBC # BLD: 6.46 K/UL — SIGNIFICANT CHANGE UP (ref 3.8–10.5)
WBC # BLD: 7.57 K/UL — SIGNIFICANT CHANGE UP (ref 3.8–10.5)
WBC # BLD: 9.38 K/UL — SIGNIFICANT CHANGE UP (ref 3.8–10.5)
WBC # FLD AUTO: 6.46 K/UL — SIGNIFICANT CHANGE UP (ref 3.8–10.5)
WBC # FLD AUTO: 7.57 K/UL — SIGNIFICANT CHANGE UP (ref 3.8–10.5)
WBC # FLD AUTO: 9.38 K/UL — SIGNIFICANT CHANGE UP (ref 3.8–10.5)

## 2022-12-06 PROCEDURE — 99233 SBSQ HOSP IP/OBS HIGH 50: CPT

## 2022-12-06 PROCEDURE — 99232 SBSQ HOSP IP/OBS MODERATE 35: CPT

## 2022-12-06 PROCEDURE — 99223 1ST HOSP IP/OBS HIGH 75: CPT

## 2022-12-06 PROCEDURE — 93010 ELECTROCARDIOGRAM REPORT: CPT

## 2022-12-06 RX ORDER — ATORVASTATIN CALCIUM 80 MG/1
80 TABLET, FILM COATED ORAL AT BEDTIME
Refills: 0 | Status: DISCONTINUED | OUTPATIENT
Start: 2022-12-06 | End: 2022-12-09

## 2022-12-06 RX ORDER — DEXTROSE 50 % IN WATER 50 %
12.5 SYRINGE (ML) INTRAVENOUS ONCE
Refills: 0 | Status: DISCONTINUED | OUTPATIENT
Start: 2022-12-06 | End: 2022-12-09

## 2022-12-06 RX ORDER — ALBUTEROL 90 UG/1
2 AEROSOL, METERED ORAL EVERY 6 HOURS
Refills: 0 | Status: DISCONTINUED | OUTPATIENT
Start: 2022-12-06 | End: 2022-12-09

## 2022-12-06 RX ORDER — GLUCAGON INJECTION, SOLUTION 0.5 MG/.1ML
1 INJECTION, SOLUTION SUBCUTANEOUS ONCE
Refills: 0 | Status: DISCONTINUED | OUTPATIENT
Start: 2022-12-06 | End: 2022-12-09

## 2022-12-06 RX ORDER — TIOTROPIUM BROMIDE 18 UG/1
2 CAPSULE ORAL; RESPIRATORY (INHALATION) DAILY
Refills: 0 | Status: DISCONTINUED | OUTPATIENT
Start: 2022-12-06 | End: 2022-12-09

## 2022-12-06 RX ORDER — DEXTROSE 50 % IN WATER 50 %
15 SYRINGE (ML) INTRAVENOUS ONCE
Refills: 0 | Status: DISCONTINUED | OUTPATIENT
Start: 2022-12-06 | End: 2022-12-09

## 2022-12-06 RX ORDER — GABAPENTIN 400 MG/1
100 CAPSULE ORAL THREE TIMES A DAY
Refills: 0 | Status: DISCONTINUED | OUTPATIENT
Start: 2022-12-06 | End: 2022-12-09

## 2022-12-06 RX ORDER — DEXTROSE 50 % IN WATER 50 %
25 SYRINGE (ML) INTRAVENOUS ONCE
Refills: 0 | Status: DISCONTINUED | OUTPATIENT
Start: 2022-12-06 | End: 2022-12-09

## 2022-12-06 RX ORDER — SODIUM CHLORIDE 9 MG/ML
1000 INJECTION, SOLUTION INTRAVENOUS
Refills: 0 | Status: DISCONTINUED | OUTPATIENT
Start: 2022-12-06 | End: 2022-12-09

## 2022-12-06 RX ORDER — SERTRALINE 25 MG/1
100 TABLET, FILM COATED ORAL DAILY
Refills: 0 | Status: DISCONTINUED | OUTPATIENT
Start: 2022-12-06 | End: 2022-12-09

## 2022-12-06 RX ORDER — MIDODRINE HYDROCHLORIDE 2.5 MG/1
5 TABLET ORAL THREE TIMES A DAY
Refills: 0 | Status: DISCONTINUED | OUTPATIENT
Start: 2022-12-06 | End: 2022-12-09

## 2022-12-06 RX ORDER — PANTOPRAZOLE SODIUM 20 MG/1
40 TABLET, DELAYED RELEASE ORAL DAILY
Refills: 0 | Status: DISCONTINUED | OUTPATIENT
Start: 2022-12-06 | End: 2022-12-09

## 2022-12-06 RX ORDER — BUDESONIDE AND FORMOTEROL FUMARATE DIHYDRATE 160; 4.5 UG/1; UG/1
2 AEROSOL RESPIRATORY (INHALATION)
Refills: 0 | Status: DISCONTINUED | OUTPATIENT
Start: 2022-12-06 | End: 2022-12-09

## 2022-12-06 RX ORDER — LEVOTHYROXINE SODIUM 125 MCG
75 TABLET ORAL DAILY
Refills: 0 | Status: DISCONTINUED | OUTPATIENT
Start: 2022-12-06 | End: 2022-12-09

## 2022-12-06 RX ORDER — INSULIN LISPRO 100/ML
VIAL (ML) SUBCUTANEOUS
Refills: 0 | Status: DISCONTINUED | OUTPATIENT
Start: 2022-12-06 | End: 2022-12-09

## 2022-12-06 RX ADMIN — CEFEPIME 1000 MILLIGRAM(S): 1 INJECTION, POWDER, FOR SOLUTION INTRAMUSCULAR; INTRAVENOUS at 18:34

## 2022-12-06 RX ADMIN — CEFEPIME 1000 MILLIGRAM(S): 1 INJECTION, POWDER, FOR SOLUTION INTRAMUSCULAR; INTRAVENOUS at 01:56

## 2022-12-06 RX ADMIN — ATORVASTATIN CALCIUM 80 MILLIGRAM(S): 80 TABLET, FILM COATED ORAL at 22:57

## 2022-12-06 RX ADMIN — CEFEPIME 1000 MILLIGRAM(S): 1 INJECTION, POWDER, FOR SOLUTION INTRAMUSCULAR; INTRAVENOUS at 06:20

## 2022-12-06 RX ADMIN — PANTOPRAZOLE SODIUM 40 MILLIGRAM(S): 20 TABLET, DELAYED RELEASE ORAL at 10:45

## 2022-12-06 RX ADMIN — SERTRALINE 100 MILLIGRAM(S): 25 TABLET, FILM COATED ORAL at 10:39

## 2022-12-06 RX ADMIN — GABAPENTIN 100 MILLIGRAM(S): 400 CAPSULE ORAL at 22:56

## 2022-12-06 RX ADMIN — MIDODRINE HYDROCHLORIDE 5 MILLIGRAM(S): 2.5 TABLET ORAL at 10:39

## 2022-12-06 RX ADMIN — CHLORHEXIDINE GLUCONATE 1 APPLICATION(S): 213 SOLUTION TOPICAL at 06:20

## 2022-12-06 RX ADMIN — BUDESONIDE AND FORMOTEROL FUMARATE DIHYDRATE 2 PUFF(S): 160; 4.5 AEROSOL RESPIRATORY (INHALATION) at 20:15

## 2022-12-06 RX ADMIN — Medication 3 MILLILITER(S): at 01:47

## 2022-12-06 NOTE — PROGRESS NOTE ADULT - SUBJECTIVE AND OBJECTIVE BOX
Patient is a 67y old  Female who presents with a chief complaint of CC: Rectal bleeding (06 Dec 2022 10:32)      BRIEF HOSPITAL COURSE: ***    Events last 24 hours: ***    PAST MEDICAL & SURGICAL HISTORY:  HTN (hypertension)      Asthma with chronic obstructive pulmonary disease (COPD)      HLD (hyperlipidemia)      Diabetes      CHF (congestive heart failure)            Medications:  cefepime  Injectable.      cefepime  Injectable. 1000 milliGRAM(s) IV Push every 12 hours    midodrine. 5 milliGRAM(s) Oral three times a day    albuterol    90 MICROgram(s) HFA Inhaler 2 Puff(s) Inhalation every 6 hours PRN  budesonide 160 MICROgram(s)/formoterol 4.5 MICROgram(s) Inhaler 2 Puff(s) Inhalation two times a day  tiotropium 2.5 MICROgram(s) Inhaler 2 Puff(s) Inhalation daily    gabapentin 100 milliGRAM(s) Oral three times a day  sertraline 100 milliGRAM(s) Oral daily        pantoprazole  Injectable 40 milliGRAM(s) IV Push daily      atorvastatin 80 milliGRAM(s) Oral at bedtime  dextrose 50% Injectable 25 Gram(s) IV Push once  dextrose 50% Injectable 12.5 Gram(s) IV Push once  dextrose 50% Injectable 25 Gram(s) IV Push once  dextrose Oral Gel 15 Gram(s) Oral once PRN  glucagon  Injectable 1 milliGRAM(s) IntraMuscular once  insulin lispro (ADMELOG) corrective regimen sliding scale   SubCutaneous Before meals and at bedtime  levothyroxine 75 MICROGram(s) Oral daily    dextrose 5%. 1000 milliLiter(s) IV Continuous <Continuous>  dextrose 5%. 1000 milliLiter(s) IV Continuous <Continuous>      chlorhexidine 4% Liquid 1 Application(s) Topical <User Schedule>            ICU Vital Signs Last 24 Hrs  T(C): 36.6 (06 Dec 2022 06:00), Max: 36.9 (05 Dec 2022 15:25)  T(F): 97.9 (06 Dec 2022 06:00), Max: 98.4 (05 Dec 2022 15:25)  HR: 71 (06 Dec 2022 10:00) (62 - 76)  BP: 119/57 (06 Dec 2022 09:05) (91/51 - 120/60)  BP(mean): 74 (06 Dec 2022 09:05) (62 - 110)  ABP: --  ABP(mean): --  RR: 19 (06 Dec 2022 10:00) (12 - 20)  SpO2: 98% (06 Dec 2022 09:05) (90% - 100%)    O2 Parameters below as of 06 Dec 2022 09:05  Patient On (Oxygen Delivery Method): nasal cannula  O2 Flow (L/min): 3          ABG - ( 05 Dec 2022 21:12 )  pH, Arterial: 7.21  pH, Blood: x     /  pCO2: 68    /  pO2: 122   / HCO3: 27    / Base Excess: -2.2  /  SaO2: 99.0                I&O's Detail        LABS:                        7.4    6.46  )-----------( 155      ( 06 Dec 2022 06:18 )             25.1     12-06    136  |  103  |  29<H>  ----------------------------<  132<H>  4.4   |  28  |  4.27<H>    Ca    8.4<L>      06 Dec 2022 09:20  Mg     2.4     12-06    TPro  7.8  /  Alb  2.2<L>  /  TBili  0.5  /  DBili  x   /  AST  27  /  ALT  24  /  AlkPhos  52  12-05          CAPILLARY BLOOD GLUCOSE        PT/INR - ( 05 Dec 2022 17:10 )   PT: 12.6 sec;   INR: 1.09 ratio         PTT - ( 05 Dec 2022 17:10 )  PTT:32.7 sec    CULTURES:  Rapid RVP Result: NotDetec (12-05 @ 16:26)      Physical Examination:    General: No acute distress.  awake and alert  HEENT: Pupils equal, reactive to light.  Symmetric.  PULM: Clear to auscultation bilaterally, no wheezing,   NECK: Supple, no lymphadenopathy, trachea midline  CVS: Regular rate and rhythm, no murmurs  ABD: Soft, nondistended, nontender, normoactive bowel sounds  EXT: No LE edema, calf nontender  SKIN: Warm and well perfused, no rashes noted.  NEURO: Alert, oriented x 4, interactive    DEVICES:     RADIOLOGY:   < from: CT Abdomen and Pelvis w/ IV Cont (12.05.22 @ 20:54) >  IMPRESSION:    No visualized active GI bleeding on this CTA.  Mild nonspecific proctitis.    Redemonstration of bibasilar opacities which may represent atelectasis or  pneumonia.  Redemonstration of a 1 cm right lower lobe pulmonary nodule which should   be followed up in 3 months.  Low density foci in the duodenum may represent duodenal lipomas.  Additional findings as described above.    < end of copied text >         Patient is a 67y old  Female who presents with a chief complaint of CC: Rectal bleeding (06 Dec 2022 10:32)    BRIEF HOSPITAL COURSE: 68 yo female with pmhx of HTN, DM2, COPD ( on 3 L home o2), CAD, HLD, pancreatitis, ESRD dialysis ( M/W/F), questionable communication deficits at baseline ? presented with bloody stools from [facility]. Pt was at her dialysis session when she went to the bathroom and saw blood in her stool. Denies hematemesis, melana, hx of alcohol abuse.  In ED was noted to be hypotensive to systolics in the 90's, Hb 7.1, lactate .5. Coags wnl. Given 1 unit of PRBC. CT abd/pelvis showed moderate distension of rectum and rectosigmoid colon with high attentuation material with concern for bleeding. CTA showed no active bleeding. Colorectal surgery and nephrology were consulted by ED.    12/6 pt taken off bipap this AM. AAOx 4, able to tell me why she came. reports feeling well. denies chest pain, sob, abd pain or cramping. also states that she is supposed to wear cpap at night but hasnt been getting it at the rehab. reports sick contacts and a non productive cough    PAST MEDICAL & SURGICAL HISTORY:  HTN (hypertension)  Asthma with chronic obstructive pulmonary disease (COPD)  HLD (hyperlipidemia)  Diabetes  CHF (congestive heart failure)    Medications:  cefepime  Injectable.      cefepime  Injectable. 1000 milliGRAM(s) IV Push every 12 hours  midodrine. 5 milliGRAM(s) Oral three times a day  albuterol    90 MICROgram(s) HFA Inhaler 2 Puff(s) Inhalation every 6 hours PRN  budesonide 160 MICROgram(s)/formoterol 4.5 MICROgram(s) Inhaler 2 Puff(s) Inhalation two times a day  tiotropium 2.5 MICROgram(s) Inhaler 2 Puff(s) Inhalation daily  gabapentin 100 milliGRAM(s) Oral three times a day  sertraline 100 milliGRAM(s) Oral daily  pantoprazole  Injectable 40 milliGRAM(s) IV Push daily  atorvastatin 80 milliGRAM(s) Oral at bedtime  dextrose 50% Injectable 25 Gram(s) IV Push once  dextrose 50% Injectable 12.5 Gram(s) IV Push once  dextrose 50% Injectable 25 Gram(s) IV Push once  dextrose Oral Gel 15 Gram(s) Oral once PRN  glucagon  Injectable 1 milliGRAM(s) IntraMuscular once  insulin lispro (ADMELOG) corrective regimen sliding scale   SubCutaneous Before meals and at bedtime  levothyroxine 75 MICROGram(s) Oral daily  dextrose 5%. 1000 milliLiter(s) IV Continuous <Continuous>  dextrose 5%. 1000 milliLiter(s) IV Continuous <Continuous>  chlorhexidine 4% Liquid 1 Application(s) Topical <User Schedule>    ICU Vital Signs Last 24 Hrs  T(C): 36.6 (06 Dec 2022 06:00), Max: 36.9 (05 Dec 2022 15:25)  T(F): 97.9 (06 Dec 2022 06:00), Max: 98.4 (05 Dec 2022 15:25)  HR: 71 (06 Dec 2022 10:00) (62 - 76)  BP: 119/57 (06 Dec 2022 09:05) (91/51 - 120/60)  BP(mean): 74 (06 Dec 2022 09:05) (62 - 110)  ABP: --  ABP(mean): --  RR: 19 (06 Dec 2022 10:00) (12 - 20)  SpO2: 98% (06 Dec 2022 09:05) (90% - 100%)    O2 Parameters below as of 06 Dec 2022 09:05  Patient On (Oxygen Delivery Method): nasal cannula  O2 Flow (L/min): 3          ABG - ( 05 Dec 2022 21:12 )  pH, Arterial: 7.21  pH, Blood: x     /  pCO2: 68    /  pO2: 122   / HCO3: 27    / Base Excess: -2.2  /  SaO2: 99.0                I&O's Detail        LABS:                        7.4    6.46  )-----------( 155      ( 06 Dec 2022 06:18 )             25.1     12-06    136  |  103  |  29<H>  ----------------------------<  132<H>  4.4   |  28  |  4.27<H>    Ca    8.4<L>      06 Dec 2022 09:20  Mg     2.4     12-06    TPro  7.8  /  Alb  2.2<L>  /  TBili  0.5  /  DBili  x   /  AST  27  /  ALT  24  /  AlkPhos  52  12-05          CAPILLARY BLOOD GLUCOSE        PT/INR - ( 05 Dec 2022 17:10 )   PT: 12.6 sec;   INR: 1.09 ratio         PTT - ( 05 Dec 2022 17:10 )  PTT:32.7 sec    CULTURES:  Rapid RVP Result: NotDetec (12-05 @ 16:26)      Physical Examination:    General: No acute distress.  awake and alert  HEENT: Pupils equal, reactive to light.  Symmetric.  PULM: Clear to auscultation bilaterally, no wheezing,   NECK: Supple, no lymphadenopathy, trachea midline  CVS: Regular rate and rhythm, no murmurs  ABD: Soft, nondistended, nontender, normoactive bowel sounds  EXT: No LE edema, calf nontender  SKIN: Warm and well perfused, no rashes noted.  NEURO: Alert, oriented x 4, interactive    DEVICES:     RADIOLOGY:   < from: CT Abdomen and Pelvis w/ IV Cont (12.05.22 @ 20:54) >  IMPRESSION:    No visualized active GI bleeding on this CTA.  Mild nonspecific proctitis.    Redemonstration of bibasilar opacities which may represent atelectasis or  pneumonia.  Redemonstration of a 1 cm right lower lobe pulmonary nodule which should   be followed up in 3 months.  Low density foci in the duodenum may represent duodenal lipomas.  Additional findings as described above.    < end of copied text >

## 2022-12-06 NOTE — PROGRESS NOTE ADULT - ATTENDING COMMENTS
Patient seen and examined this morning. GI bleed and needing transfusions. CRS on standby if doesn't stop and GI and IR unable to stop bleed. Has multiple comorbidities which makes her a poor candidate.

## 2022-12-06 NOTE — PROVIDER CONTACT NOTE (OTHER) - SITUATION
Kristofer is aware that the patient is in [facility].  Please fax discharge papers to the doctor at [phone].

## 2022-12-06 NOTE — CONSULT NOTE ADULT - ASSESSMENT
A 67 year old female with bleeding per rectum    Plan:  Patient needs to be managed in a critical care unit  NPO  IV fluids  Serial H/H  Trasnfuse as needed  IR and GI consults    Plan discussed with Dr Rob
67  HTN, DM2, COPD ( on 3 L home o2), CAD, HLD, pancreatitis, ESRD dialysis ( M/W/F),  ? presented with bloody stools from [facility] [place], operated by [facility] with renal fu of ESRD.    ESRD  -HD TIW, completed on 12/5  -HD today with need for PRBC and baseline poor respiratory status  -UF today as tolerated, goal near 2 kg    Anemia  -2 u with hd  -GI/surgical services  -ICU care ongoing    COPD  -Nightly bipap, baseline o2  -ICU care    d/c with ICU team, surgical staff and attending
67 year old female with rectal bleeding, anemia, and hypotension s/p dialysis    Imp:  Likely diverticular in nature due to CT with diffuse diverticulosis and no active bleed localized    On second unit prbc. Hypotensive in setting of bleeding & post dialysis yesterday and now being dialyzed again today due to fluid balance in setting of active transfusion    Rec:  ::Maintain hemodynamics  ::Continue surveillance  ::May need IR embo versus colonoscopy   ::If sudden onset bleeding, stat CT angio abdomen then IR  ::If no appropriate response to transfusion, then colonoscopy

## 2022-12-06 NOTE — PROGRESS NOTE ADULT - ASSESSMENT
ASSESSMENT     ASSESSMENT  68 yo female with PMHx of HTN, DM2, COPD ( on 3 L home o2), CAD, HLD, pancreatitis, ESRD dialysis ( M/W/F), questionable communication deficits at baseline ?     Presented from [facility] for bloody stools after HD session    Found to be hypotensive, Anemic Hgb 7.1  CTAP with IV contrast revealing no active bleed. but moderate distention of rectum and rectosigmoid colon with high attenuation material, possible duodenal lipoma and nonspecific proctitis. Also showing bibasilar opacities PNA vs atelectasis.   AGB revealing respiratory acidosis  In ED received 1u pRBC, started on cefepime for suspected HCAP, and placed on bipap    Admitted for:  1. LGIB suspected due to diverticular bleed?   2. acute blood loss anemia  3. acute hypercapnic respiratory failure 2/2 PNA vs noncompliance with nocturnal bipap  4. COPD with chronic respiratory failure  5. ESRD on HD MWF, HTN, DM2, HLD    PLAN  - mentation better today. AAOX4. awake and alert. poor historian when probing into details about medical hx  - BP better and stable. cont home dose midodrine 5mg tid. cont to hold anti-HTN for now. hold home diuretics  - weaned off bipap this am, on 3L NC sating 97%. no wheezing on ausculation- no steroids for now. resumed symbicort, spiriva, alb inh.   - clear liquid diet for now. monitor BMs  - possible colonoscopy pending clinical course  - ESRD on HD, will get HD session today  - cont IV cefepime for suspected HCAP. will order sputum culture. blood cultures pending.  - transfuse 2u pRBC with dialysis today. Trend H/H. f/u cbc 1800.   - no chemical DVT ppx in setting of bleeding. cont SCDs  PT eval    Dispo: SICU. trend H/H. IV abx. monitor pulse ox    Will discuss with Dr. Ortez

## 2022-12-06 NOTE — CONSULT NOTE ADULT - SUBJECTIVE AND OBJECTIVE BOX
Patient is a 67y Female whom presented to the hospital with  HTN, DM2, COPD ( on 3 L home o2), CAD, HLD, pancreatitis, ESRD dialysis ( M/W/F) unclear of baseline nephrologist,  presented with bloody stools from [facility], renal evaluation of ESRD. Pt was at her dialysis session when she went to the bathroom and saw blood in her stool.  In ED was noted to be hypotensive and had CTA showed no active bleeding. SP prbc overnight.     PAST MEDICAL & SURGICAL HISTORY:  HTN (hypertension)      Asthma with chronic obstructive pulmonary disease (COPD)      HLD (hyperlipidemia)      Diabetes      CHF (congestive heart failure)          MEDICATIONS  (STANDING):  atorvastatin 80 milliGRAM(s) Oral at bedtime  budesonide 160 MICROgram(s)/formoterol 4.5 MICROgram(s) Inhaler 2 Puff(s) Inhalation two times a day  cefepime  Injectable.      cefepime  Injectable. 1000 milliGRAM(s) IV Push every 12 hours  chlorhexidine 4% Liquid 1 Application(s) Topical <User Schedule>  dextrose 5%. 1000 milliLiter(s) (50 mL/Hr) IV Continuous <Continuous>  dextrose 5%. 1000 milliLiter(s) (100 mL/Hr) IV Continuous <Continuous>  dextrose 50% Injectable 25 Gram(s) IV Push once  dextrose 50% Injectable 12.5 Gram(s) IV Push once  dextrose 50% Injectable 25 Gram(s) IV Push once  gabapentin 100 milliGRAM(s) Oral three times a day  glucagon  Injectable 1 milliGRAM(s) IntraMuscular once  insulin lispro (ADMELOG) corrective regimen sliding scale   SubCutaneous Before meals and at bedtime  levothyroxine 75 MICROGram(s) Oral daily  midodrine. 5 milliGRAM(s) Oral three times a day  pantoprazole  Injectable 40 milliGRAM(s) IV Push daily  sertraline 100 milliGRAM(s) Oral daily  tiotropium 2.5 MICROgram(s) Inhaler 2 Puff(s) Inhalation daily    MEDICATIONS  (PRN):  albuterol    90 MICROgram(s) HFA Inhaler 2 Puff(s) Inhalation every 6 hours PRN for shortness of breath and/or wheezing  dextrose Oral Gel 15 Gram(s) Oral once PRN Blood Glucose LESS THAN 70 milliGRAM(s)/deciliter      Allergies    [place] (Unknown)  No Known Drug Allergies    Intolerances        SOCIAL HISTORY:    FAMILY HISTORY:      REVIEW OF SYSTEMS:    CONSTITUTIONAL: stable weakness, fevers or chills  EYES/ENT: No visual changes;  No vertigo or throat pain   NECK: No pain or stiffness  RESPIRATORY: No cough, wheezing, hemoptysis; No shortness of breath  CARDIOVASCULAR: No chest pain or palpitations  GASTROINTESTINAL: No abdominal or epigastric pain. No nausea, vomiting, or hematemesis; No diarrhea or constipation. No melena or hematochezia.  GENITOURINARY: No dysuria, frequency or hematuria  NEUROLOGICAL: No numbness or weakness  SKIN: No itching, burning, rashes, or lesions   All other review of systems is negative unless indicated above.      T(C): , Max: 36.9 (12-05-22 @ 15:25)  T(F): , Max: 98.4 (12-05-22 @ 15:25)  HR: 71 (12-06-22 @ 11:08)  BP: 97/54 (12-06-22 @ 11:08)  BP(mean): 67 (12-06-22 @ 11:08)  RR: 15 (12-06-22 @ 11:08)  SpO2: 100% (12-06-22 @ 11:08)  Wt(kg): --    Height (cm): 165.1 (12-05 @ 15:25)  Weight (kg): 136.1 (12-05 @ 15:25)  BMI (kg/m2): 49.9 (12-05 @ 15:25)  BSA (m2): 2.35 (12-05 @ 15:25)    PHYSICAL EXAM:    Constitutional: NAD   HEENT: PERRLA, EOMI,  MMM  Neck: No LAD, No JVD  Respiratory: dist  Cardiovascular: S1 and S2, RRR  Gastrointestinal: BS+, soft, NT/ND  Extremities: chr peripheral edema  Neurological: A/O x 3, no focal deficits  Psychiatric: Normal mood, normal affect  : No [place]  Skin: No rashes  Access: [facility]      LABS:                        7.4    6.46  )-----------( 155      ( 06 Dec 2022 06:18 )             25.1     06 Dec 2022 09:20    136    |  103    |  29     ----------------------------<  132    4.4     |  28     |  4.27   05 Dec 2022 17:10    136    |  103    |  21     ----------------------------<  159    4.0     |  30     |  3.69     Ca    8.4        06 Dec 2022 09:20  Ca    8.5        05 Dec 2022 17:10  Mg     2.4       06 Dec 2022 09:20    TPro  7.8    /  Alb  2.2    /  TBili  0.5    /  DBili  x      /  AST  27     /  ALT  24     /  AlkPhos  52     05 Dec 2022 17:10          Urine Studies:          RADIOLOGY & ADDITIONAL STUDIES:                
Patient is a 67y old  Female who presents with a chief complaint of CC: Rectal bleeding    HPI:  This is a 67 year old female with past medical history of HTN, DM2, COPD ( on 3 L home O2), CAD, HLD, pancreatitis, ESRD dialysis ( M/W/F) presented with bloody stools [facility]. Pt was at her dialysis session when she went to the bathroom and saw blood in her stool. Denies hematemesis, melena, or hematochezia.  In ED was noted to be hypotensive to systolics in the 90's, Hb 7.1, lactate 5. Transfused one unit prbc. CT abd/pelvis showed moderate distension of rectum and rectosigmoid colon with high attenuation material with concern for bleeding. CTA showed no active bleeding. Colorectal surgery evaluated.   Seen at bedside this morning on SDU being dialyzed and transfused one unit PRBC. Patient is awake, alert and does seem to have hearing deficits. Denies any recent bloody stools. Per Critical care PA- bloody loose stool overnight. Still borderline hypotensive with systolic 90's while on dialysis. Denies any abdominal or epigastric pain. Does endorse back pain. Denies constipation, vomiting fever, or chills.    PAST MEDICAL & SURGICAL HISTORY:  HTN (hypertension)      Asthma with chronic obstructive pulmonary disease (COPD)      HLD (hyperlipidemia)      Diabetes      CHF (congestive heart failure)      MEDICATIONS  (STANDING):  atorvastatin 80 milliGRAM(s) Oral at bedtime  budesonide 160 MICROgram(s)/formoterol 4.5 MICROgram(s) Inhaler 2 Puff(s) Inhalation two times a day  cefepime  Injectable.      cefepime  Injectable. 1000 milliGRAM(s) IV Push every 12 hours  chlorhexidine 4% Liquid 1 Application(s) Topical <User Schedule>  dextrose 5%. 1000 milliLiter(s) (50 mL/Hr) IV Continuous <Continuous>  dextrose 5%. 1000 milliLiter(s) (100 mL/Hr) IV Continuous <Continuous>  dextrose 50% Injectable 25 Gram(s) IV Push once  dextrose 50% Injectable 12.5 Gram(s) IV Push once  dextrose 50% Injectable 25 Gram(s) IV Push once  gabapentin 100 milliGRAM(s) Oral three times a day  glucagon  Injectable 1 milliGRAM(s) IntraMuscular once  insulin lispro (ADMELOG) corrective regimen sliding scale   SubCutaneous Before meals and at bedtime  levothyroxine 75 MICROGram(s) Oral daily  midodrine. 5 milliGRAM(s) Oral three times a day  pantoprazole  Injectable 40 milliGRAM(s) IV Push daily  sertraline 100 milliGRAM(s) Oral daily  tiotropium 2.5 MICROgram(s) Inhaler 2 Puff(s) Inhalation daily    MEDICATIONS  (PRN):  albuterol    90 MICROgram(s) HFA Inhaler 2 Puff(s) Inhalation every 6 hours PRN for shortness of breath and/or wheezing  dextrose Oral Gel 15 Gram(s) Oral once PRN Blood Glucose LESS THAN 70 milliGRAM(s)/deciliter    Allergies    [place] (Unknown)  No Known Drug Allergies    Intolerances    SOCIAL HISTORY:    FAMILY HISTORY:    REVIEW OF SYSTEMS:    CONSTITUTIONAL: No weakness, fevers or chills  EYES/ENT: No visual changes;  No vertigo or throat pain   NECK: No pain or stiffness  RESPIRATORY: No cough, wheezing, hemoptysis; No shortness of breath  CARDIOVASCULAR: No chest pain or palpitations  GASTROINTESTINAL: See HPI  GENITOURINARY: No dysuria, frequency or hematuria  NEUROLOGICAL: No numbness or weakness  SKIN: No itching, burning, rashes, or lesions   PSYCH: Normal mood and affect  All other review of systems is negative unless indicated above.    Vital Signs Last 24 Hrs  T(C): 36.4 (06 Dec 2022 12:37), Max: 36.9 (05 Dec 2022 15:25)  T(F): 97.6 (06 Dec 2022 12:37), Max: 98.4 (05 Dec 2022 15:25)  HR: 63 (06 Dec 2022 12:37) (62 - 76)  BP: 125/56 (06 Dec 2022 12:37) (90/51 - 125/56)  BP(mean): 72 (06 Dec 2022 12:37) (59 - 110)  RR: 14 (06 Dec 2022 12:37) (12 - 29)  SpO2: 100% (06 Dec 2022 12:37) (90% - 100%)    Parameters below as of 06 Dec 2022 11:35  Patient On (Oxygen Delivery Method): nasal cannula  O2 Flow (L/min): 3      PHYSICAL EXAM:    Constitutional: No acute distress, well-developed, non-toxic appearing  HEENT: masked, good phonation, not icteric  Neck: supple, no lymphadenopathy  Respiratory: clear to ascultation bilaterally, no wheezing  Cardiovascular: S1 and S2, regular rate and rhythm, no murmurs rubs or gallops  Gastrointestinal: soft, non-tender, protuberant due to body habitus, +bowel sounds, no rebound or guarding, no surgical scars, no drains  Extremities: No peripheral edema, no cyanosis or clubbing  Vascular: 2+ peripheral pulses, no venous stasis  Neurological: A/O x 3, no focal deficits, no asterixis  Psychiatric: Normal mood, normal affect  Skin: No rashes, not jaundiced    LABS:                        7.4    6.46  )-----------( 155      ( 06 Dec 2022 06:18 )             25.1     12-06    136  |  103  |  29<H>  ----------------------------<  132<H>  4.4   |  28  |  4.27<H>    Ca    8.4<L>      06 Dec 2022 09:20  Mg     2.4     12-06    TPro  7.8  /  Alb  2.2<L>  /  TBili  0.5  /  DBili  x   /  AST  27  /  ALT  24  /  AlkPhos  52  12-05    PT/INR - ( 05 Dec 2022 17:10 )   PT: 12.6 sec;   INR: 1.09 ratio         PTT - ( 05 Dec 2022 17:10 )  PTT:32.7 sec  LIVER FUNCTIONS - ( 05 Dec 2022 17:10 )  Alb: 2.2 g/dL / Pro: 7.8 gm/dL / ALK PHOS: 52 U/L / ALT: 24 U/L / AST: 27 U/L / GGT: x             RADIOLOGY & ADDITIONAL STUDIES:  IMPRESSION:    No visualized active GI bleeding on this CTA.  Mild nonspecific proctitis.    Redemonstration of bibasilar opacities which may represent atelectasis or   pneumonia.  Redemonstration of a 1 cm right lower lobe pulmonary nodule which should   be followed up in 3 months.  Low density foci in the duodenum may represent duodenal lipomas.  Additional findings as described above.
A 67 year old female with multiple medical comorbidities. She was seen in the ED for bleeding per rectum that started today after her dialysis session. Patient started having diarrhea after her dialysis and when she arrived back at her nursing home, she was told that she was having blood coming from her rectum and was taken to the hospital. She also started having dizziness and lightheadedness with some confusion. Patient denies any abdominal pain, nausea, vomiting or bleeding from other orifices    Physical:  GENERAL: sleepy, confused, lethargiv  HEAD:  Atraumatic, Normocephalic  EYES: EOMI, PERRLA, conjunctiva and sclera clear  ENMT: No tonsillar erythema, exudates, or enlargement; Moist mucous membranes  NECK: Supple, No JVD, Normal thyroid  NERVOUS SYSTEM:  A Ox3. Although lethargic, frequently closing eyes. Non-focal. Cn2-12 intact.   CHEST/LUNG: Clear to auscultation bilaterally; No rales, rhonchi, wheezing, or rubs  HEART: Regular rate and rhythm; No murmurs, rubs, or gallops  ABDOMEN: Soft, Nontender, Nondistended; Bowel sounds present  EXTREMITIES:  2+ Peripheral Pulses, No clubbing, cyanosis, or edema                          8.7    9.38  )-----------( 152      ( 06 Dec 2022 00:57 )             29.1   12-05    136  |  103  |  21  ----------------------------<  159<H>  4.0   |  30  |  3.69<H>    Ca    8.5      05 Dec 2022 17:10    TPro  7.8  /  Alb  2.2<L>  /  TBili  0.5  /  DBili  x   /  AST  27  /  ALT  24  /  AlkPhos  52  12-05    < from: CT Abdomen and Pelvis w/ IV Cont (12.05.22 @ 20:54) >  No visualized active GI bleeding on this CTA.  Mild nonspecific proctitis.    Redemonstration of bibasilar opacities which may represent atelectasis or  pneumonia.  Redemonstration of a 1 cm right lower lobe pulmonary nodule which should   be followed up in 3 months.  Low density foci in the duodenum may represent duodenal lipomas.  Additional findings as described above.      < end of copied text >

## 2022-12-06 NOTE — PROGRESS NOTE ADULT - SUBJECTIVE AND OBJECTIVE BOX
Patient was seen and examined today bedside, the patient denied any pain, No bloody bowel movement reported over night. Patient got two PRBCs over night. No acute events were reported from nursing staff.    ICU Vital Signs Last 24 Hrs  T(C): 36.6 (06 Dec 2022 06:00), Max: 36.9 (05 Dec 2022 15:25)  T(F): 97.9 (06 Dec 2022 06:00), Max: 98.4 (05 Dec 2022 15:25)  HR: 71 (06 Dec 2022 10:00) (62 - 76)  BP: 119/57 (06 Dec 2022 09:05) (91/51 - 120/60)  BP(mean): 74 (06 Dec 2022 09:05) (62 - 110)  ABP: --  ABP(mean): --  RR: 19 (06 Dec 2022 10:00) (12 - 20)  SpO2: 98% (06 Dec 2022 09:05) (90% - 100%)    O2 Parameters below as of 06 Dec 2022 09:05  Patient On (Oxygen Delivery Method): nasal cannula  O2 Flow (L/min): 3      Physical:  GENERAL: sleepy, confused, lethargiv  HEAD:  Atraumatic, Normocephalic  EYES: EOMI, PERRLA, conjunctiva and sclera clear  ENMT: No tonsillar erythema, exudates, or enlargement; Moist mucous membranes  NECK: Supple, No JVD, Normal thyroid  NERVOUS SYSTEM:  A Ox3. Although lethargic, frequently closing eyes. Non-focal. Cn2-12 intact.   CHEST/LUNG: Clear to auscultation bilaterally; No rales, rhonchi, wheezing, or rubs  HEART: Regular rate and rhythm; No murmurs, rubs, or gallops  ABDOMEN: Soft, Nontender, Nondistended; Bowel sounds present  EXTREMITIES:  2+ Peripheral Pulses, No clubbing, cyanosis, or edema                          7.4    6.46  )-----------( 155      ( 06 Dec 2022 06:18 )             25.1   12-06    136  |  103  |  29<H>  ----------------------------<  132<H>  4.4   |  28  |  4.27<H>    Ca    8.4<L>      06 Dec 2022 09:20  Mg     2.4     12-06    TPro  7.8  /  Alb  2.2<L>  /  TBili  0.5  /  DBili  x   /  AST  27  /  ALT  24  /  AlkPhos  52  12-05

## 2022-12-06 NOTE — PATIENT PROFILE ADULT - FALL HARM RISK - HARM RISK INTERVENTIONS

## 2022-12-06 NOTE — CONSULT NOTE ADULT - NS ATTEND AMEND GEN_ALL_CORE FT
66yo female with esrd on dialysis with brbpr    ct without evidence of colitis  she reported some pain to me    ?diverticular    will consider colonoscopy pending clinical course    getting transfused at dialysis

## 2022-12-07 LAB
A1C WITH ESTIMATED AVERAGE GLUCOSE RESULT: 5.8 % — HIGH (ref 4–5.6)
ANION GAP SERPL CALC-SCNC: 4 MMOL/L — LOW (ref 5–17)
BUN SERPL-MCNC: 35 MG/DL — HIGH (ref 7–23)
CALCIUM SERPL-MCNC: 8.6 MG/DL — SIGNIFICANT CHANGE UP (ref 8.5–10.1)
CHLORIDE SERPL-SCNC: 103 MMOL/L — SIGNIFICANT CHANGE UP (ref 96–108)
CO2 SERPL-SCNC: 26 MMOL/L — SIGNIFICANT CHANGE UP (ref 22–31)
CREAT SERPL-MCNC: 3.94 MG/DL — HIGH (ref 0.5–1.3)
EGFR: 12 ML/MIN/1.73M2 — LOW
ESTIMATED AVERAGE GLUCOSE: 120 MG/DL — HIGH (ref 68–114)
GLUCOSE BLDC GLUCOMTR-MCNC: 113 MG/DL — HIGH (ref 70–99)
GLUCOSE BLDC GLUCOMTR-MCNC: 133 MG/DL — HIGH (ref 70–99)
GLUCOSE SERPL-MCNC: 134 MG/DL — HIGH (ref 70–99)
HBV SURFACE AB SER-ACNC: SIGNIFICANT CHANGE UP
HCT VFR BLD CALC: 24.6 % — LOW (ref 34.5–45)
HCT VFR BLD CALC: 26.9 % — LOW (ref 34.5–45)
HCT VFR BLD CALC: 30.6 % — LOW (ref 34.5–45)
HGB BLD-MCNC: 7.9 G/DL — LOW (ref 11.5–15.5)
HGB BLD-MCNC: 8.5 G/DL — LOW (ref 11.5–15.5)
HGB BLD-MCNC: 9.9 G/DL — LOW (ref 11.5–15.5)
MAGNESIUM SERPL-MCNC: 2.3 MG/DL — SIGNIFICANT CHANGE UP (ref 1.6–2.6)
MCHC RBC-ENTMCNC: 29.5 PG — SIGNIFICANT CHANGE UP (ref 27–34)
MCHC RBC-ENTMCNC: 29.7 PG — SIGNIFICANT CHANGE UP (ref 27–34)
MCHC RBC-ENTMCNC: 29.8 PG — SIGNIFICANT CHANGE UP (ref 27–34)
MCHC RBC-ENTMCNC: 31.6 GM/DL — LOW (ref 32–36)
MCHC RBC-ENTMCNC: 32.1 GM/DL — SIGNIFICANT CHANGE UP (ref 32–36)
MCHC RBC-ENTMCNC: 32.4 GM/DL — SIGNIFICANT CHANGE UP (ref 32–36)
MCV RBC AUTO: 92.2 FL — SIGNIFICANT CHANGE UP (ref 80–100)
MCV RBC AUTO: 92.5 FL — SIGNIFICANT CHANGE UP (ref 80–100)
MCV RBC AUTO: 93.4 FL — SIGNIFICANT CHANGE UP (ref 80–100)
PHOSPHATE SERPL-MCNC: 3.9 MG/DL — SIGNIFICANT CHANGE UP (ref 2.5–4.5)
PLATELET # BLD AUTO: 144 K/UL — LOW (ref 150–400)
PLATELET # BLD AUTO: 148 K/UL — LOW (ref 150–400)
PLATELET # BLD AUTO: 151 K/UL — SIGNIFICANT CHANGE UP (ref 150–400)
POTASSIUM SERPL-MCNC: 4 MMOL/L — SIGNIFICANT CHANGE UP (ref 3.5–5.3)
POTASSIUM SERPL-SCNC: 4 MMOL/L — SIGNIFICANT CHANGE UP (ref 3.5–5.3)
RBC # BLD: 2.66 M/UL — LOW (ref 3.8–5.2)
RBC # BLD: 2.88 M/UL — LOW (ref 3.8–5.2)
RBC # BLD: 3.32 M/UL — LOW (ref 3.8–5.2)
RBC # FLD: 16.8 % — HIGH (ref 10.3–14.5)
RBC # FLD: 17.6 % — HIGH (ref 10.3–14.5)
RBC # FLD: 17.8 % — HIGH (ref 10.3–14.5)
SODIUM SERPL-SCNC: 133 MMOL/L — LOW (ref 135–145)
WBC # BLD: 6.29 K/UL — SIGNIFICANT CHANGE UP (ref 3.8–10.5)
WBC # BLD: 7.48 K/UL — SIGNIFICANT CHANGE UP (ref 3.8–10.5)
WBC # BLD: 7.6 K/UL — SIGNIFICANT CHANGE UP (ref 3.8–10.5)
WBC # FLD AUTO: 6.29 K/UL — SIGNIFICANT CHANGE UP (ref 3.8–10.5)
WBC # FLD AUTO: 7.48 K/UL — SIGNIFICANT CHANGE UP (ref 3.8–10.5)
WBC # FLD AUTO: 7.6 K/UL — SIGNIFICANT CHANGE UP (ref 3.8–10.5)

## 2022-12-07 PROCEDURE — 99232 SBSQ HOSP IP/OBS MODERATE 35: CPT

## 2022-12-07 PROCEDURE — 99232 SBSQ HOSP IP/OBS MODERATE 35: CPT | Mod: GC

## 2022-12-07 RX ADMIN — MIDODRINE HYDROCHLORIDE 5 MILLIGRAM(S): 2.5 TABLET ORAL at 05:30

## 2022-12-07 RX ADMIN — CEFEPIME 1000 MILLIGRAM(S): 1 INJECTION, POWDER, FOR SOLUTION INTRAMUSCULAR; INTRAVENOUS at 17:19

## 2022-12-07 RX ADMIN — GABAPENTIN 100 MILLIGRAM(S): 400 CAPSULE ORAL at 21:18

## 2022-12-07 RX ADMIN — Medication 75 MICROGRAM(S): at 05:30

## 2022-12-07 RX ADMIN — GABAPENTIN 100 MILLIGRAM(S): 400 CAPSULE ORAL at 05:30

## 2022-12-07 RX ADMIN — SERTRALINE 100 MILLIGRAM(S): 25 TABLET, FILM COATED ORAL at 17:16

## 2022-12-07 RX ADMIN — BUDESONIDE AND FORMOTEROL FUMARATE DIHYDRATE 2 PUFF(S): 160; 4.5 AEROSOL RESPIRATORY (INHALATION) at 10:39

## 2022-12-07 RX ADMIN — MIDODRINE HYDROCHLORIDE 5 MILLIGRAM(S): 2.5 TABLET ORAL at 14:01

## 2022-12-07 RX ADMIN — MIDODRINE HYDROCHLORIDE 5 MILLIGRAM(S): 2.5 TABLET ORAL at 21:18

## 2022-12-07 RX ADMIN — TIOTROPIUM BROMIDE 2 PUFF(S): 18 CAPSULE ORAL; RESPIRATORY (INHALATION) at 10:39

## 2022-12-07 RX ADMIN — ATORVASTATIN CALCIUM 80 MILLIGRAM(S): 80 TABLET, FILM COATED ORAL at 21:18

## 2022-12-07 RX ADMIN — PANTOPRAZOLE SODIUM 40 MILLIGRAM(S): 20 TABLET, DELAYED RELEASE ORAL at 17:15

## 2022-12-07 RX ADMIN — GABAPENTIN 100 MILLIGRAM(S): 400 CAPSULE ORAL at 17:16

## 2022-12-07 RX ADMIN — CEFEPIME 1000 MILLIGRAM(S): 1 INJECTION, POWDER, FOR SOLUTION INTRAMUSCULAR; INTRAVENOUS at 05:30

## 2022-12-07 RX ADMIN — BUDESONIDE AND FORMOTEROL FUMARATE DIHYDRATE 2 PUFF(S): 160; 4.5 AEROSOL RESPIRATORY (INHALATION) at 20:04

## 2022-12-07 NOTE — PROGRESS NOTE ADULT - SUBJECTIVE AND OBJECTIVE BOX
Patient is a 67y old  Female who presents with a chief complaint of CC: Rectal bleeding (06 Dec 2022 10:32)    BRIEF HOSPITAL COURSE: 66 yo female with pmhx of HTN, DM2, COPD ( on 3 L home o2), CAD, HLD, pancreatitis, ESRD dialysis ( M/W/F), questionable communication deficits at baseline ? presented with bloody stools from [facility]. Pt was at her dialysis session when she went to the bathroom and saw blood in her stool. Denies hematemesis, melana, hx of alcohol abuse.  In ED was noted to be hypotensive to systolics in the 90's, Hb 7.1, lactate .5. Coags wnl. Given 1 unit of PRBC. CT abd/pelvis showed moderate distension of rectum and rectosigmoid colon with high attentuation material with concern for bleeding. CTA showed no active bleeding. Colorectal surgery and nephrology were consulted by ED.    12/6 pt taken off bipap this AM. AAOx 4, able to tell me why she came. reports feeling well. denies chest pain, sob, abd pain or cramping. also states that she is supposed to wear cpap at night but hasnt been getting it at the rehab. reports sick contacts and a non productive cough  12/7 reports feeling well today. tolerated bipap overnight, felt a little lightheaded getting up with PT and c/o LE weakness when trying to stand. No bloody BM overnight    PAST MEDICAL & SURGICAL HISTORY:  HTN (hypertension)  Asthma with chronic obstructive pulmonary disease (COPD)  HLD (hyperlipidemia)  Diabetes  CHF (congestive heart failure)      MEDICATIONS  (STANDING):  atorvastatin 80 milliGRAM(s) Oral at bedtime  budesonide 160 MICROgram(s)/formoterol 4.5 MICROgram(s) Inhaler 2 Puff(s) Inhalation two times a day  cefepime  Injectable.      cefepime  Injectable. 1000 milliGRAM(s) IV Push every 12 hours  chlorhexidine 4% Liquid 1 Application(s) Topical <User Schedule>  dextrose 5%. 1000 milliLiter(s) (50 mL/Hr) IV Continuous <Continuous>  dextrose 5%. 1000 milliLiter(s) (100 mL/Hr) IV Continuous <Continuous>  dextrose 50% Injectable 25 Gram(s) IV Push once  dextrose 50% Injectable 12.5 Gram(s) IV Push once  dextrose 50% Injectable 25 Gram(s) IV Push once  gabapentin 100 milliGRAM(s) Oral three times a day  glucagon  Injectable 1 milliGRAM(s) IntraMuscular once  insulin lispro (ADMELOG) corrective regimen sliding scale   SubCutaneous Before meals and at bedtime  levothyroxine 75 MICROGram(s) Oral daily  midodrine. 5 milliGRAM(s) Oral three times a day  pantoprazole  Injectable 40 milliGRAM(s) IV Push daily  sertraline 100 milliGRAM(s) Oral daily  tiotropium 2.5 MICROgram(s) Inhaler 2 Puff(s) Inhalation daily      Vital Signs Last 24 Hrs  T(C): 36.6 (07 Dec 2022 09:22), Max: 36.9 (06 Dec 2022 20:42)  T(F): 97.9 (07 Dec 2022 09:22), Max: 98.5 (06 Dec 2022 20:42)  HR: 67 (07 Dec 2022 12:00) (62 - 77)  BP: 115/56 (07 Dec 2022 12:00) (91/79 - 149/65)  BP(mean): 75 (07 Dec 2022 12:00) (61 - 105)  RR: 22 (07 Dec 2022 12:00) (10 - 26)  SpO2: 97% (07 Dec 2022 11:02) (76% - 100%)    Parameters below as of 07 Dec 2022 11:02  Patient On (Oxygen Delivery Method): nasal cannula    ABG - ( 05 Dec 2022 21:12 )  pH, Arterial: 7.21  pH, Blood: x     /  pCO2: 68    /  pO2: 122   / HCO3: 27    / Base Excess: -2.2  /  SaO2: 99.0                                8.5    7.60  )-----------( 148      ( 07 Dec 2022 06:15 )             26.9     12-07    133<L>  |  103  |  35<H>  ----------------------------<  134<H>  4.0   |  26  |  3.94<H>    Ca    8.6      07 Dec 2022 06:15  Phos  3.9     12-07  Mg     2.3     12-07    TPro  7.8  /  Alb  2.2<L>  /  TBili  0.5  /  DBili  x   /  AST  27  /  ALT  24  /  AlkPhos  52  12-05    LIVER FUNCTIONS - ( 05 Dec 2022 17:10 )  Alb: 2.2 g/dL / Pro: 7.8 gm/dL / ALK PHOS: 52 U/L / ALT: 24 U/L / AST: 27 U/L / GGT: x           PT/INR - ( 05 Dec 2022 17:10 )   PT: 12.6 sec;   INR: 1.09 ratio      PTT - ( 05 Dec 2022 17:10 )  PTT:32.7 sec    ABG - ( 05 Dec 2022 21:12 )  pH, Arterial: 7.21  pH, Blood: x     /  pCO2: 68    /  pO2: 122   / HCO3: 27    / Base Excess: -2.2  /  SaO2: 99.0        CULTURES:  Rapid RVP Result: NotDetec (12-05 @ 16:26)      Physical Examination:    General: No acute distress.  awake and alert  HEENT: Pupils equal, reactive to light.  Symmetric.  PULM: Clear to auscultation bilaterally, no wheezing,   NECK: Supple, no lymphadenopathy, trachea midline  CVS: Regular rate and rhythm, no murmurs  ABD: Soft, nondistended, nontender, normoactive bowel sounds  EXT: No LE edema, calf nontender  SKIN: Warm and well perfused, no rashes noted.  NEURO: Alert, oriented x 4, interactive    DEVICES:   R sided HD catheter    RADIOLOGY:   < from: CT Abdomen and Pelvis w/ IV Cont (12.05.22 @ 20:54) >  IMPRESSION:    No visualized active GI bleeding on this CTA.  Mild nonspecific proctitis.    Redemonstration of bibasilar opacities which may represent atelectasis or  pneumonia.  Redemonstration of a 1 cm right lower lobe pulmonary nodule which should   be followed up in 3 months.  Low density foci in the duodenum may represent duodenal lipomas.  Additional findings as described above.    < end of copied text >

## 2022-12-07 NOTE — PROGRESS NOTE ADULT - TIME BILLING
Patient seen and examined, chart including vitals, labs, consultant notes all reviewed.  No acute issues overnight. No further bloody BMs.  She did receive 2u pRBCs yesterday with initially appropriate response to Hgb 9.2; this morning Hgb has drifted down to 8.5, though clinically no further bleeding and she has been hemodynamically stable.    -- Serial H/H  -- Serial abdominal exams  -- If clinically has further bleeding she would need stat imaging (CTA), followed by either GI/IR intervention.  Surgery would be a final resort and she is a poor surgical candidate with her many comorbidities.

## 2022-12-07 NOTE — PROGRESS NOTE ADULT - SUBJECTIVE AND OBJECTIVE BOX
Patient is a 67y Female who reports no complaints as new. HD tolerated new.       MEDICATIONS  (STANDING):  atorvastatin 80 milliGRAM(s) Oral at bedtime  budesonide 160 MICROgram(s)/formoterol 4.5 MICROgram(s) Inhaler 2 Puff(s) Inhalation two times a day  cefepime  Injectable.      cefepime  Injectable. 1000 milliGRAM(s) IV Push every 12 hours  chlorhexidine 4% Liquid 1 Application(s) Topical <User Schedule>  dextrose 5%. 1000 milliLiter(s) (50 mL/Hr) IV Continuous <Continuous>  dextrose 5%. 1000 milliLiter(s) (100 mL/Hr) IV Continuous <Continuous>  dextrose 50% Injectable 25 Gram(s) IV Push once  dextrose 50% Injectable 12.5 Gram(s) IV Push once  dextrose 50% Injectable 25 Gram(s) IV Push once  gabapentin 100 milliGRAM(s) Oral three times a day  glucagon  Injectable 1 milliGRAM(s) IntraMuscular once  insulin lispro (ADMELOG) corrective regimen sliding scale   SubCutaneous Before meals and at bedtime  levothyroxine 75 MICROGram(s) Oral daily  midodrine. 5 milliGRAM(s) Oral three times a day  pantoprazole  Injectable 40 milliGRAM(s) IV Push daily  sertraline 100 milliGRAM(s) Oral daily  tiotropium 2.5 MICROgram(s) Inhaler 2 Puff(s) Inhalation daily    MEDICATIONS  (PRN):  albuterol    90 MICROgram(s) HFA Inhaler 2 Puff(s) Inhalation every 6 hours PRN for shortness of breath and/or wheezing  dextrose Oral Gel 15 Gram(s) Oral once PRN Blood Glucose LESS THAN 70 milliGRAM(s)/deciliter        T(C): , Max: 36.9 (12-06-22 @ 20:42)  T(F): , Max: 98.5 (12-06-22 @ 20:42)  HR: 63 (12-07-22 @ 10:00)  BP: 107/55 (12-07-22 @ 10:00)  BP(mean): 70 (12-07-22 @ 10:00)  RR: 15 (12-07-22 @ 10:00)  SpO2: 99% (12-07-22 @ 06:00)  Wt(kg): --    12-06 @ 07:01 - 12-07 @ 07:00  --------------------------------------------------------  IN: 3989 mL / OUT: 400 mL / NET: 3589 mL          PHYSICAL EXAM:    Constitutional: NAD, frail  HEENT: MM   CTAB  Cardiovascular: S1 and S2   Gastrointestinal: soft  tr  Psychiatric: Normal mood, normal affect  : No Thompson  Skin: No rashes  Access: td        LABS:                        8.5    7.60  )-----------( 148      ( 07 Dec 2022 06:15 )             26.9     07 Dec 2022 06:15    133    |  103    |  35     ----------------------------<  134    4.0     |  26     |  3.94   06 Dec 2022 09:20    136    |  103    |  29     ----------------------------<  132    4.4     |  28     |  4.27   05 Dec 2022 17:10    136    |  103    |  21     ----------------------------<  159    4.0     |  30     |  3.69     Ca    8.6        07 Dec 2022 06:15  Ca    8.4        06 Dec 2022 09:20  Ca    8.5        05 Dec 2022 17:10  Phos  3.9       07 Dec 2022 06:15  Mg     2.3       07 Dec 2022 06:15  Mg     2.4       06 Dec 2022 09:20    TPro  7.8    /  Alb  2.2    /  TBili  0.5    /  DBili  x      /  AST  27     /  ALT  24     /  AlkPhos  52     05 Dec 2022 17:10          Urine Studies:          RADIOLOGY & ADDITIONAL STUDIES:

## 2022-12-07 NOTE — PROGRESS NOTE ADULT - ASSESSMENT
67 year old female with rectal bleeding, anemia, and hypotension s/p dialysis    Imp:  Likely diverticular in nature due to CT with diffuse diverticulosis and abrupt onset and cessation bleeding    No bleeding overnight again  Hgb with subtle decline    Rec:  ::Maintain hemodynamics  ::Continue surveillance  ::May need IR embo versus colonoscopy   ::If sudden onset bleeding, stat CT angio abdomen then IR  ::If no appropriate response to transfusion, then colonoscopy

## 2022-12-07 NOTE — PROGRESS NOTE ADULT - SUBJECTIVE AND OBJECTIVE BOX
Patient is a 67y old  Female who presents with a chief complaint of CC: Rectal bleeding    Seen at bedside with cpap in place, sleeping.  Discussed with PA- Anuradha De Oliveira- no further bloody stools.  Hgb with subtle drift down.       MEDICATIONS  (STANDING):  atorvastatin 80 milliGRAM(s) Oral at bedtime  budesonide 160 MICROgram(s)/formoterol 4.5 MICROgram(s) Inhaler 2 Puff(s) Inhalation two times a day  cefepime  Injectable.      cefepime  Injectable. 1000 milliGRAM(s) IV Push every 12 hours  chlorhexidine 4% Liquid 1 Application(s) Topical <User Schedule>  dextrose 5%. 1000 milliLiter(s) (50 mL/Hr) IV Continuous <Continuous>  dextrose 5%. 1000 milliLiter(s) (100 mL/Hr) IV Continuous <Continuous>  dextrose 50% Injectable 25 Gram(s) IV Push once  dextrose 50% Injectable 12.5 Gram(s) IV Push once  dextrose 50% Injectable 25 Gram(s) IV Push once  gabapentin 100 milliGRAM(s) Oral three times a day  glucagon  Injectable 1 milliGRAM(s) IntraMuscular once  insulin lispro (ADMELOG) corrective regimen sliding scale   SubCutaneous Before meals and at bedtime  levothyroxine 75 MICROGram(s) Oral daily  midodrine. 5 milliGRAM(s) Oral three times a day  pantoprazole  Injectable 40 milliGRAM(s) IV Push daily  sertraline 100 milliGRAM(s) Oral daily  tiotropium 2.5 MICROgram(s) Inhaler 2 Puff(s) Inhalation daily    MEDICATIONS  (PRN):  albuterol    90 MICROgram(s) HFA Inhaler 2 Puff(s) Inhalation every 6 hours PRN for shortness of breath and/or wheezing  dextrose Oral Gel 15 Gram(s) Oral once PRN Blood Glucose LESS THAN 70 milliGRAM(s)/deciliter      Vital Signs Last 24 Hrs  T(C): 36.6 (07 Dec 2022 09:22), Max: 36.9 (06 Dec 2022 20:42)  T(F): 97.9 (07 Dec 2022 09:22), Max: 98.5 (06 Dec 2022 20:42)  HR: 63 (07 Dec 2022 10:00) (62 - 77)  BP: 107/55 (07 Dec 2022 10:00) (91/79 - 149/65)  BP(mean): 70 (07 Dec 2022 10:00) (61 - 105)  RR: 15 (07 Dec 2022 10:00) (10 - 29)  SpO2: 97% (07 Dec 2022 11:02) (76% - 100%)    Parameters below as of 07 Dec 2022 11:02  Patient On (Oxygen Delivery Method): nasal cannula        PHYSICAL EXAM:    Constitutional: No acute distress, well-developed, non-toxic appearing  HEENT: cpap in place  Neck: supple, no lymphadenopathy  Respiratory: clear to ascultation bilaterally, no wheezing anteriorly  Cardiovascular: S1 and S2, regular rate and rhythm, no murmurs rubs or gallops  Gastrointestinal: soft, protuberant due to body habitus, +bowel sounds, no rebound or guarding, no surgical scars, no drains  Extremities: No peripheral edema, no cyanosis or clubbing  Vascular: 2+ peripheral pulses, no venous stasis  Neurological: A/O x 3, no focal deficits, no asterixis  Psychiatric: Normal mood, normal affect  Skin: No rashes, not jaundiced    LABS:                        8.5    7.60  )-----------( 148      ( 07 Dec 2022 06:15 )             26.9     12-07    133<L>  |  103  |  35<H>  ----------------------------<  134<H>  4.0   |  26  |  3.94<H>    Ca    8.6      07 Dec 2022 06:15  Phos  3.9     12-07  Mg     2.3     12-07    TPro  7.8  /  Alb  2.2<L>  /  TBili  0.5  /  DBili  x   /  AST  27  /  ALT  24  /  AlkPhos  52  12-05    PT/INR - ( 05 Dec 2022 17:10 )   PT: 12.6 sec;   INR: 1.09 ratio         PTT - ( 05 Dec 2022 17:10 )  PTT:32.7 sec  LIVER FUNCTIONS - ( 05 Dec 2022 17:10 )  Alb: 2.2 g/dL / Pro: 7.8 gm/dL / ALK PHOS: 52 U/L / ALT: 24 U/L / AST: 27 U/L / GGT: x             RADIOLOGY & ADDITIONAL STUDIES:

## 2022-12-07 NOTE — PHYSICAL THERAPY INITIAL EVALUATION ADULT - ADDITIONAL COMMENTS
Patient reported falling down a flight of stairs over the summer, requiring HILTON. Now at [facility] and not in therapy  "they leave me in the bed...I haven't walked in a while".  No previous physical therapy notes in EMR to refer to.

## 2022-12-07 NOTE — PROGRESS NOTE ADULT - ASSESSMENT
A 67 year old female with bleeding per rectum    Plan:    Serial H/H and transfuse if needed.  GI consult appreciated: will consider colonoscopy pending clinical course       Plan discussed with Dr. Key

## 2022-12-07 NOTE — PROGRESS NOTE ADULT - SUBJECTIVE AND OBJECTIVE BOX
Patient was seen and examined today bedside, the patient denied any pain, No bloody bowel movement reported over night. No acute events were reported from nursing staff.    ICU Vital Signs Last 24 Hrs  Vital Signs Last 24 Hrs  T(C): 36.8 (07 Dec 2022 06:00), Max: 36.9 (06 Dec 2022 20:42)  T(F): 98.2 (07 Dec 2022 06:00), Max: 98.5 (06 Dec 2022 20:42)  HR: 62 (07 Dec 2022 08:00) (62 - 77)  BP: 102/52 (07 Dec 2022 08:00) (90/51 - 149/65)  BP(mean): 67 (07 Dec 2022 08:00) (59 - 105)  RR: 14 (07 Dec 2022 08:00) (10 - 29)  SpO2: 99% (07 Dec 2022 06:00) (76% - 100%)    Parameters below as of 07 Dec 2022 02:00  Patient On (Oxygen Delivery Method): BiPAP/CPAP    O2 Concentration (%): 50      Physical:  GENERAL: sleepy, confused, lethargiv  HEAD:  Atraumatic, Normocephalic  EYES: EOMI, PERRLA, conjunctiva and sclera clear  ENMT: No tonsillar erythema, exudates, or enlargement; Moist mucous membranes  NECK: Supple, No JVD, Normal thyroid  NERVOUS SYSTEM:  A Ox3. Although lethargic, frequently closing eyes. Non-focal. Cn2-12 intact.   CHEST/LUNG: Clear to auscultation bilaterally; No rales, rhonchi, wheezing, or rubs  HEART: Regular rate and rhythm; No murmurs, rubs, or gallops  ABDOMEN: Soft, Nontender, Nondistended; Bowel sounds present  EXTREMITIES:  2+ Peripheral Pulses, No clubbing, cyanosis, or edema                                     8.5    7.60  )-----------( 148      ( 07 Dec 2022 06:15 )             26.9     12-07    133<L>  |  103  |  35<H>  ----------------------------<  134<H>  4.0   |  26  |  3.94<H>    Ca    8.6      07 Dec 2022 06:15  Phos  3.9     12-07  Mg     2.3     12-07    TPro  7.8  /  Alb  2.2<L>  /  TBili  0.5  /  DBili  x   /  AST  27  /  ALT  24  /  AlkPhos  52  12-05    MEDICATIONS  (STANDING):  atorvastatin 80 milliGRAM(s) Oral at bedtime  budesonide 160 MICROgram(s)/formoterol 4.5 MICROgram(s) Inhaler 2 Puff(s) Inhalation two times a day  cefepime  Injectable.      cefepime  Injectable. 1000 milliGRAM(s) IV Push every 12 hours  chlorhexidine 4% Liquid 1 Application(s) Topical <User Schedule>  dextrose 5%. 1000 milliLiter(s) (50 mL/Hr) IV Continuous <Continuous>  dextrose 5%. 1000 milliLiter(s) (100 mL/Hr) IV Continuous <Continuous>  dextrose 50% Injectable 25 Gram(s) IV Push once  dextrose 50% Injectable 12.5 Gram(s) IV Push once  dextrose 50% Injectable 25 Gram(s) IV Push once  gabapentin 100 milliGRAM(s) Oral three times a day  glucagon  Injectable 1 milliGRAM(s) IntraMuscular once  insulin lispro (ADMELOG) corrective regimen sliding scale   SubCutaneous Before meals and at bedtime  levothyroxine 75 MICROGram(s) Oral daily  midodrine. 5 milliGRAM(s) Oral three times a day  pantoprazole  Injectable 40 milliGRAM(s) IV Push daily  sertraline 100 milliGRAM(s) Oral daily  tiotropium 2.5 MICROgram(s) Inhaler 2 Puff(s) Inhalation daily    MEDICATIONS  (PRN):  albuterol    90 MICROgram(s) HFA Inhaler 2 Puff(s) Inhalation every 6 hours PRN for shortness of breath and/or wheezing  dextrose Oral Gel 15 Gram(s) Oral once PRN Blood Glucose LESS THAN 70 milliGRAM(s)/deciliter

## 2022-12-07 NOTE — PHYSICAL THERAPY INITIAL EVALUATION ADULT - PERTINENT HX OF CURRENT PROBLEM, REHAB EVAL
66 yo F admitted from [facility] c/o bloody stools. S/P one unit of pRBCs.  CT abd/pelvis showed moderate distension of rectum and rectosigmoid colon with high attentuations material with concern for bleeding. CTA showed no active bleeding.

## 2022-12-07 NOTE — PHYSICAL THERAPY INITIAL EVALUATION ADULT - NSACTIVITYREC_GEN_A_PT
Maximal Assistance Lift, such as Letitia, Esther Flex or Esther Stedy, is recommended for OOB transfers for safe staff and patient handling.

## 2022-12-07 NOTE — PROGRESS NOTE ADULT - ASSESSMENT
ASSESSMENT  66 yo female with PMHx of HTN, DM2, COPD ( on 3 L home o2), CAD, HLD, pancreatitis, ESRD dialysis ( M/W/F), questionable communication deficits at baseline ?     Presented from [facility] for bloody stools after HD session    Found to be hypotensive, Anemic Hgb 7.1  CTAP with IV contrast revealing no active bleed. but moderate distention of rectum and rectosigmoid colon with high attenuation material, possible duodenal lipoma and nonspecific proctitis. Also showing bibasilar opacities PNA vs atelectasis.   AGB revealing respiratory acidosis  In ED received 1u pRBC, started on cefepime for suspected HCAP, and placed on bipap    Admitted for:  1. LGIB suspected due to diverticular bleed?   2. acute blood loss anemia  3. acute hypercapnic respiratory failure 2/2 PNA vs noncompliance with nocturnal bipap  4. COPD with chronic respiratory failure  5. ESRD on HD MWF, HTN, DM2, HLD    PLAN  - mentation better today. AAOX4. awake and alert. poor historian when probing into details about medical hx  - BP better and stable. cont home dose midodrine 5mg tid. cont to hold anti-HTN for now. hold home diuretics  - on 3L NC sating 97%. no wheezing on ausculation- no steroids for now. resumed symbicort, spiriva, alb inh.   - clear liquid diet for now. monitor BMs. adv diet if H/H stable  - possible colonoscopy pending clinical course  - ESRD on HD MWF, will get HD session today  - cont IV cefepime for suspected HCAP. sputum culture. blood cultures neg to date  - s/p 2u pRBC yesterday. Hgb trending down, but no blood BM overnight. f/u cbc 1200.   - no chemical DVT ppx in setting of bleeding. cont SCDs  PT eval    Dispo: IV abx. monitor pulse ox. f/u CBC 1200. HD today    Will discuss with Dr. Arthur

## 2022-12-07 NOTE — PHYSICAL THERAPY INITIAL EVALUATION ADULT - GENERAL OBSERVATIONS, REHAB EVAL
Patient received in bed in SICU, +ICU monitors, +Bipap at bedside, +O2@3L via nc, +B SCDs. Patient denied pain.

## 2022-12-07 NOTE — PHYSICAL THERAPY INITIAL EVALUATION ADULT - MODALITIES TREATMENT COMMENTS
Patient returned to bed by request, call bell in reach and bed alarm active. Patient to have bedside HD today. Maximal Assistance Lift, such as Letitia, Esther Flex or Esther Stedy, is recommended for OOB transfers for safe staff and patient handling. RN informed of session/status.

## 2022-12-07 NOTE — PROGRESS NOTE ADULT - ASSESSMENT
67  HTN, DM2, COPD ( on 3 L home o2), CAD, HLD, pancreatitis, ESRD dialysis ( M/W/F),  ? presented with bloody stools from [facility] with renal fu of ESRD.    ESRD  -HD TIW, MWF is her normal day so plan for today  -FU hgb, give prbc per primary team with hd if needed  -UF today as tolerated     Anemia  -GI/surgical services  -ICU care ongoing    COPD  -Nightly bipap, baseline o2  -ICU care    d/c with ICU team, HD team, GAYATHRI najera

## 2022-12-08 ENCOUNTER — TRANSCRIPTION ENCOUNTER (OUTPATIENT)
Age: 67
End: 2022-12-08

## 2022-12-08 LAB
ANION GAP SERPL CALC-SCNC: 5 MMOL/L — SIGNIFICANT CHANGE UP (ref 5–17)
BUN SERPL-MCNC: 23 MG/DL — SIGNIFICANT CHANGE UP (ref 7–23)
CALCIUM SERPL-MCNC: 8.1 MG/DL — LOW (ref 8.5–10.1)
CHLORIDE SERPL-SCNC: 104 MMOL/L — SIGNIFICANT CHANGE UP (ref 96–108)
CO2 SERPL-SCNC: 26 MMOL/L — SIGNIFICANT CHANGE UP (ref 22–31)
CREAT SERPL-MCNC: 2.94 MG/DL — HIGH (ref 0.5–1.3)
EGFR: 17 ML/MIN/1.73M2 — LOW
FERRITIN SERPL-MCNC: 1920 NG/ML — HIGH (ref 15–150)
GLUCOSE SERPL-MCNC: 101 MG/DL — HIGH (ref 70–99)
HCT VFR BLD CALC: 27.6 % — LOW (ref 34.5–45)
HGB BLD-MCNC: 8.9 G/DL — LOW (ref 11.5–15.5)
IRON SATN MFR SERPL: 34 % — SIGNIFICANT CHANGE UP (ref 14–50)
IRON SATN MFR SERPL: 53 UG/DL — SIGNIFICANT CHANGE UP (ref 30–160)
MAGNESIUM SERPL-MCNC: 2 MG/DL — SIGNIFICANT CHANGE UP (ref 1.6–2.6)
MCHC RBC-ENTMCNC: 29.8 PG — SIGNIFICANT CHANGE UP (ref 27–34)
MCHC RBC-ENTMCNC: 32.2 GM/DL — SIGNIFICANT CHANGE UP (ref 32–36)
MCV RBC AUTO: 92.3 FL — SIGNIFICANT CHANGE UP (ref 80–100)
PHOSPHATE SERPL-MCNC: 2.4 MG/DL — LOW (ref 2.5–4.5)
PLATELET # BLD AUTO: 145 K/UL — LOW (ref 150–400)
POTASSIUM SERPL-MCNC: 3.6 MMOL/L — SIGNIFICANT CHANGE UP (ref 3.5–5.3)
POTASSIUM SERPL-SCNC: 3.6 MMOL/L — SIGNIFICANT CHANGE UP (ref 3.5–5.3)
RBC # BLD: 2.99 M/UL — LOW (ref 3.8–5.2)
RBC # FLD: 16.7 % — HIGH (ref 10.3–14.5)
SODIUM SERPL-SCNC: 135 MMOL/L — SIGNIFICANT CHANGE UP (ref 135–145)
TIBC SERPL-MCNC: 155 UG/DL — LOW (ref 220–430)
UIBC SERPL-MCNC: 102 UG/DL — LOW (ref 110–370)
WBC # BLD: 6.43 K/UL — SIGNIFICANT CHANGE UP (ref 3.8–10.5)
WBC # FLD AUTO: 6.43 K/UL — SIGNIFICANT CHANGE UP (ref 3.8–10.5)

## 2022-12-08 PROCEDURE — 99232 SBSQ HOSP IP/OBS MODERATE 35: CPT

## 2022-12-08 PROCEDURE — 99233 SBSQ HOSP IP/OBS HIGH 50: CPT

## 2022-12-08 RX ORDER — ACETAMINOPHEN 500 MG
650 TABLET ORAL EVERY 6 HOURS
Refills: 0 | Status: DISCONTINUED | OUTPATIENT
Start: 2022-12-08 | End: 2022-12-09

## 2022-12-08 RX ORDER — FUROSEMIDE 40 MG
1 TABLET ORAL
Qty: 0 | Refills: 0 | DISCHARGE

## 2022-12-08 RX ORDER — HYDRALAZINE HCL 50 MG
1 TABLET ORAL
Qty: 0 | Refills: 0 | DISCHARGE

## 2022-12-08 RX ORDER — OXYCODONE AND ACETAMINOPHEN 5; 325 MG/1; MG/1
1 TABLET ORAL
Qty: 0 | Refills: 0 | DISCHARGE

## 2022-12-08 RX ORDER — METOPROLOL TARTRATE 50 MG
1 TABLET ORAL
Qty: 0 | Refills: 0 | DISCHARGE

## 2022-12-08 RX ORDER — METOLAZONE 5 MG/1
1 TABLET ORAL
Qty: 0 | Refills: 0 | DISCHARGE

## 2022-12-08 RX ORDER — AMLODIPINE BESYLATE 2.5 MG/1
1 TABLET ORAL
Qty: 0 | Refills: 0 | DISCHARGE

## 2022-12-08 RX ORDER — MAGNESIUM HYDROXIDE 400 MG/1
15 TABLET, CHEWABLE ORAL
Qty: 0 | Refills: 0 | DISCHARGE

## 2022-12-08 RX ORDER — DIPHENHYDRAMINE HCL 50 MG
25 CAPSULE ORAL EVERY 4 HOURS
Refills: 0 | Status: DISCONTINUED | OUTPATIENT
Start: 2022-12-08 | End: 2022-12-09

## 2022-12-08 RX ADMIN — MIDODRINE HYDROCHLORIDE 5 MILLIGRAM(S): 2.5 TABLET ORAL at 18:12

## 2022-12-08 RX ADMIN — GABAPENTIN 100 MILLIGRAM(S): 400 CAPSULE ORAL at 21:55

## 2022-12-08 RX ADMIN — Medication 650 MILLIGRAM(S): at 22:56

## 2022-12-08 RX ADMIN — PANTOPRAZOLE SODIUM 40 MILLIGRAM(S): 20 TABLET, DELAYED RELEASE ORAL at 10:03

## 2022-12-08 RX ADMIN — SERTRALINE 100 MILLIGRAM(S): 25 TABLET, FILM COATED ORAL at 10:03

## 2022-12-08 RX ADMIN — GABAPENTIN 100 MILLIGRAM(S): 400 CAPSULE ORAL at 13:59

## 2022-12-08 RX ADMIN — Medication 1: at 16:46

## 2022-12-08 RX ADMIN — CHLORHEXIDINE GLUCONATE 1 APPLICATION(S): 213 SOLUTION TOPICAL at 06:14

## 2022-12-08 RX ADMIN — Medication 25 MILLIGRAM(S): at 21:55

## 2022-12-08 RX ADMIN — Medication 650 MILLIGRAM(S): at 21:56

## 2022-12-08 RX ADMIN — ATORVASTATIN CALCIUM 80 MILLIGRAM(S): 80 TABLET, FILM COATED ORAL at 21:55

## 2022-12-08 RX ADMIN — CEFEPIME 1000 MILLIGRAM(S): 1 INJECTION, POWDER, FOR SOLUTION INTRAMUSCULAR; INTRAVENOUS at 17:58

## 2022-12-08 RX ADMIN — TIOTROPIUM BROMIDE 2 PUFF(S): 18 CAPSULE ORAL; RESPIRATORY (INHALATION) at 09:51

## 2022-12-08 RX ADMIN — BUDESONIDE AND FORMOTEROL FUMARATE DIHYDRATE 2 PUFF(S): 160; 4.5 AEROSOL RESPIRATORY (INHALATION) at 09:51

## 2022-12-08 RX ADMIN — MIDODRINE HYDROCHLORIDE 5 MILLIGRAM(S): 2.5 TABLET ORAL at 13:59

## 2022-12-08 RX ADMIN — Medication 3: at 13:58

## 2022-12-08 RX ADMIN — BUDESONIDE AND FORMOTEROL FUMARATE DIHYDRATE 2 PUFF(S): 160; 4.5 AEROSOL RESPIRATORY (INHALATION) at 20:38

## 2022-12-08 RX ADMIN — CEFEPIME 1000 MILLIGRAM(S): 1 INJECTION, POWDER, FOR SOLUTION INTRAMUSCULAR; INTRAVENOUS at 06:09

## 2022-12-08 NOTE — PROGRESS NOTE ADULT - ASSESSMENT
68yo female with hematochezia    likely diverticular bleeding  unclear to what extent she is still bleeding  maintain liquid diet for POSSIBLE colonoscopy - will determine later today pending clinical course

## 2022-12-08 NOTE — PROGRESS NOTE ADULT - SUBJECTIVE AND OBJECTIVE BOX
Patient is a 67y Female who reports no complaints as new.   pt does not recall name of her nephrologist     MEDICATIONS  (STANDING):  atorvastatin 80 milliGRAM(s) Oral at bedtime  budesonide 160 MICROgram(s)/formoterol 4.5 MICROgram(s) Inhaler 2 Puff(s) Inhalation two times a day  cefepime  Injectable.      cefepime  Injectable. 1000 milliGRAM(s) IV Push every 12 hours  chlorhexidine 4% Liquid 1 Application(s) Topical <User Schedule>  dextrose 5%. 1000 milliLiter(s) (50 mL/Hr) IV Continuous <Continuous>  dextrose 5%. 1000 milliLiter(s) (100 mL/Hr) IV Continuous <Continuous>  dextrose 50% Injectable 25 Gram(s) IV Push once  dextrose 50% Injectable 12.5 Gram(s) IV Push once  dextrose 50% Injectable 25 Gram(s) IV Push once  gabapentin 100 milliGRAM(s) Oral three times a day  glucagon  Injectable 1 milliGRAM(s) IntraMuscular once  insulin lispro (ADMELOG) corrective regimen sliding scale   SubCutaneous Before meals and at bedtime  levothyroxine 75 MICROGram(s) Oral daily  midodrine. 5 milliGRAM(s) Oral three times a day  pantoprazole  Injectable 40 milliGRAM(s) IV Push daily  sertraline 100 milliGRAM(s) Oral daily  tiotropium 2.5 MICROgram(s) Inhaler 2 Puff(s) Inhalation daily    MEDICATIONS  (PRN):  albuterol    90 MICROgram(s) HFA Inhaler 2 Puff(s) Inhalation every 6 hours PRN for shortness of breath and/or wheezing  dextrose Oral Gel 15 Gram(s) Oral once PRN Blood Glucose LESS THAN 70 milliGRAM(s)/deciliter      Vital Signs Last 24 Hrs  T(C): 36.6 (08 Dec 2022 09:52), Max: 36.9 (07 Dec 2022 17:29)  T(F): 97.9 (08 Dec 2022 09:52), Max: 98.4 (07 Dec 2022 17:29)  HR: 72 (08 Dec 2022 14:00) (55 - 83)  BP: 93/53 (08 Dec 2022 14:00) (91/54 - 128/70)  BP(mean): 65 (08 Dec 2022 14:00) (65 - 87)  RR: 23 (08 Dec 2022 14:00) (16 - 25)  SpO2: 96% (08 Dec 2022 14:00) (96% - 100%)    Parameters below as of 08 Dec 2022 14:00  Patient On (Oxygen Delivery Method): nasal cannula  O2 Flow (L/min): 3        PHYSICAL EXAM:    Constitutional: NAD, frail  HEENT: MM   CTAB  Cardiovascular: S1 and S2   Gastrointestinal: soft  tr  Psychiatric: Normal mood, normal affect  : No Thompson  Skin: No rashes  Access: tdc        LABS:                                     8.9    6.43  )-----------( 145      ( 08 Dec 2022 06:00 )             27.6                         9.9    7.48  )-----------( 151      ( 07 Dec 2022 19:52 )             30.6           135    |  104    |  23     ----------------------------<  101       08 Dec 2022 06:00  3.6     |  26     |  2.94     133    |  103    |  35     ----------------------------<  134       07 Dec 2022 06:15  4.0     |  26     |  3.94     136    |  103    |  29     ----------------------------<  132       06 Dec 2022 09:20  4.4     |  28     |  4.27     Ca    8.1        08 Dec 2022 06:00  Ca    8.6        07 Dec 2022 06:15    Phos  2.4       08 Dec 2022 06:00  Phos  3.9       07 Dec 2022 06:15    Mg     2.0       08 Dec 2022 06:00  Mg     2.3       07 Dec 2022 06:15    TPro  7.8    /  Alb  2.2    /  TBili  0.5    /        05 Dec 2022 17:10  DBili  x      /  AST  27     /  ALT  24     /  AlkPhos  52             Urine Studies:          RADIOLOGY & ADDITIONAL STUDIES:

## 2022-12-08 NOTE — DISCHARGE NOTE PROVIDER - HOSPITAL COURSE
6 yo female with PMHx of HTN, DM2, COPD ( on 3 L home o2), CAD, HLD, pancreatitis, ESRD dialysis ( M/W/F), questionable communication deficits at baseline ? Presented from [facility] for bloody stools after HD session hypotensive, Anemic Hgb 7.1. Initially admitted to ICU for acute hypercapnic failure due to COPD and Home BIpap non compliance, acute management of acute blood anemia with lower Gi Bleed. Patient now downgraded to medicine.    Lower GI Bleed likely diverticular  CTAP with IV contrast revealing no active bleed. but moderate distention of rectum and rectosigmoid colon with high attenuation material, possible duodenal lipoma and nonspecific proctitis. Also showing bibasilar opacities PNA vs atelectasis.   In ED received 1u pRBC, started on cefepime for suspected HCAP, and placed on bipap  evaluated by colorectal surgery and GI  s/p 2u pRBC in ICU.  hb 8.9  plan  advance diet, monitor. Hold on colonoscopy for now    acute hypercapnic respiratory failure 2/2 PNA vs noncompliance with nocturnal bipap / COPD with chronic respiratory failure  s/p cefepime and Bipap in the ICU  on 3L NC sating 97%. resumed symbicort, spiriva, alb inh.     ESRD on HD MWF,     Other chronic morbidities: HTN, DM2, HLD  taper down midodrine  advance diet, coverage with insulin    PLAN  - BP better and stable. cont home dose midodrine 5mg tid. cont to hold anti-HTN for now. hold home diuretics  - no chemical DVT ppx in setting of bleeding. cont SCDs    PT eval : patient for dispo to rehab. Dc planning started patient needs auth and repeat covid swab

## 2022-12-08 NOTE — PROGRESS NOTE ADULT - ASSESSMENT
68 yo female with PMHx of HTN, DM2, COPD ( on 3 L home o2), CAD, HLD, pancreatitis, ESRD dialysis ( M/W/F), questionable communication deficits at baseline ? Presented from [facility] for bloody stools after HD session hypotensive, Anemic Hgb 7.1. Initially admitted to ICU for acute hypercapnic failure due to COPD and Home BIpap non compliance, acute management of acute blood anemia with lower Gi Bleed. Patient now downgraded to medicine.    Lower GI Bleed likely diverticular  CTAP with IV contrast revealing no active bleed. but moderate distention of rectum and rectosigmoid colon with high attenuation material, possible duodenal lipoma and nonspecific proctitis. Also showing bibasilar opacities PNA vs atelectasis.   In ED received 1u pRBC, started on cefepime for suspected HCAP, and placed on bipap  evaluated by colorectal surgery and GI  s/p 2u pRBC in ICU.  hb 8.9  plan  advance diet, monitor. Hold on colonoscopy for now    acute hypercapnic respiratory failure 2/2 PNA vs noncompliance with nocturnal bipap / COPD with chronic respiratory failure  s/p cefepime and Bipap in the ICU  on 3L NC sating 97%. resumed symbicort, spiriva, alb inh.     ESRD on HD MWF,     Other chronic morbidities: HTN, DM2, HLD  taper down midodrine  advance diet, coverage with insulin    PLAN  - BP better and stable. cont home dose midodrine 5mg tid. cont to hold anti-HTN for now. hold home diuretics  - no chemical DVT ppx in setting of bleeding. cont SCDs    PT eval : patient for dispo to rehab. Dc planning started patient needs auth and repeat covid swab

## 2022-12-08 NOTE — PROGRESS NOTE ADULT - TIME BILLING
Seen and examined.  Minimal blood per rectum per bedside RN.  No complaints.  Pawel clear liquids.  AFVSS  NAD  soft, NTND  hgb 9  A/P -  Advance diet as tolerated.  Cont to trend H/H.

## 2022-12-08 NOTE — PROGRESS NOTE ADULT - SUBJECTIVE AND OBJECTIVE BOX
Patient is a 67y old  Female who presents with a chief complaint of CC: Rectal bleeding (07 Dec 2022 12:56)      HPI:  pt with scant rectal bleeding by report  does not appear to be large volume bleeding  no abdominal pain      PAST MEDICAL & SURGICAL HISTORY:  HTN (hypertension)      Asthma with chronic obstructive pulmonary disease (COPD)      HLD (hyperlipidemia)      Diabetes      CHF (congestive heart failure)          MEDICATIONS  (STANDING):  atorvastatin 80 milliGRAM(s) Oral at bedtime  budesonide 160 MICROgram(s)/formoterol 4.5 MICROgram(s) Inhaler 2 Puff(s) Inhalation two times a day  cefepime  Injectable.      cefepime  Injectable. 1000 milliGRAM(s) IV Push every 12 hours  chlorhexidine 4% Liquid 1 Application(s) Topical <User Schedule>  dextrose 5%. 1000 milliLiter(s) (50 mL/Hr) IV Continuous <Continuous>  dextrose 5%. 1000 milliLiter(s) (100 mL/Hr) IV Continuous <Continuous>  dextrose 50% Injectable 25 Gram(s) IV Push once  dextrose 50% Injectable 12.5 Gram(s) IV Push once  dextrose 50% Injectable 25 Gram(s) IV Push once  gabapentin 100 milliGRAM(s) Oral three times a day  glucagon  Injectable 1 milliGRAM(s) IntraMuscular once  insulin lispro (ADMELOG) corrective regimen sliding scale   SubCutaneous Before meals and at bedtime  levothyroxine 75 MICROGram(s) Oral daily  midodrine. 5 milliGRAM(s) Oral three times a day  pantoprazole  Injectable 40 milliGRAM(s) IV Push daily  sertraline 100 milliGRAM(s) Oral daily  tiotropium 2.5 MICROgram(s) Inhaler 2 Puff(s) Inhalation daily    MEDICATIONS  (PRN):  albuterol    90 MICROgram(s) HFA Inhaler 2 Puff(s) Inhalation every 6 hours PRN for shortness of breath and/or wheezing  dextrose Oral Gel 15 Gram(s) Oral once PRN Blood Glucose LESS THAN 70 milliGRAM(s)/deciliter      Allergies    [place] (Unknown)  No Known Drug Allergies    Intolerances        REVIEW OF SYSTEMS:    CONSTITUTIONAL: No weakness, fevers or chills  RESPIRATORY: No cough, wheezing, hemoptysis; No shortness of breath  CARDIOVASCULAR: No chest pain or palpitations  GASTROINTESTINAL: as above  All other review of systems is negative unless indicated above.    Vital Signs Last 24 Hrs  T(C): 36.8 (08 Dec 2022 06:39), Max: 37.1 (07 Dec 2022 13:00)  T(F): 98.3 (08 Dec 2022 06:39), Max: 98.8 (07 Dec 2022 13:00)  HR: 66 (08 Dec 2022 06:03) (55 - 73)  BP: 128/70 (08 Dec 2022 06:03) (91/54 - 128/70)  BP(mean): 87 (08 Dec 2022 06:03) (65 - 87)  RR: 22 (08 Dec 2022 06:03) (14 - 25)  SpO2: 97% (08 Dec 2022 06:03) (97% - 100%)    Parameters below as of 07 Dec 2022 20:04  Patient On (Oxygen Delivery Method): nasal cannula        PHYSICAL EXAM:  Constitutional: NAD  Respiratory: CTAB  Cardiovascular: S1 and S2  Gastrointestinal: BS+, soft, NT/ND    LABS:                        8.9    6.43  )-----------( 145      ( 08 Dec 2022 06:00 )             27.6     12-08    135  |  104  |  23  ----------------------------<  101<H>  3.6   |  26  |  2.94<H>    Ca    8.1<L>      08 Dec 2022 06:00  Phos  2.4     12-08  Mg     2.0     12-08            RADIOLOGY & ADDITIONAL STUDIES:

## 2022-12-08 NOTE — DISCHARGE NOTE PROVIDER - CARE PROVIDERS DIRECT ADDRESSES
[email or web address]

## 2022-12-08 NOTE — PROGRESS NOTE ADULT - ASSESSMENT
67  HTN, DM2, COPD ( on 3 L home o2), CAD, HLD, pancreatitis, ESRD dialysis ( M/W/F),  ? presented with bloody stools from [facility] with renal fu of ESRD.    ESRD  -HD TIW, MWF as ordered   -FU hgb, give prbc per primary team with HD if needed    Anemia  -GI/CRS  services  -ICU care ongoing    COPD  -Nightly bipap, baseline o2  -ICU care    d/w SICU RN

## 2022-12-08 NOTE — DISCHARGE NOTE PROVIDER - CARE PROVIDER_API CALL
Hugh Carlson)  Gastroenterology; Internal Medicine  [address] [place] [address] [place] [address]  Phone: [phone]  Fax: [phone]  Follow Up Time: 2 weeks    Colby Chambers)  Internal Medicine; Nephrology  [address] [place] [address]  Phone: [phone]  Fax: [phone]  Follow Up Time: 2 weeks

## 2022-12-08 NOTE — PROGRESS NOTE ADULT - SUBJECTIVE AND OBJECTIVE BOX
Patient seen and examined   downgraded from ICU  feels well  some bloody stool overnight  Hb noted at 8.9  patient seen by CRC and GI: colonoscopy on hold at this time    Review of Systems:  General:denies fever chills, headache, weakness  HEENT: denies blurry vision,diffculty swallowing, difficulty hearing, tinnitus  Cardiovascular: denies chest pain  ,palpitations  Pulmonary:denies shortness of breath, cough, wheezing, hemoptysis  Gastrointestinal: denies abdominal pain, constipation, diarrhea,nausea , vomiting, hematochezia  : denies hematuria, dysuria, or incontinence  Neurological: denies weakness, numbness , tingling, dizziness, tremors  MSK: denies muscle pain, difficulty ambulating, swelling, back pain  skin: denies skin rash, itching, burning, or  skin lesions  Psychiatrical: denies mood disturbances, anxierty, feeling depressed, depression , or difficulty sleeping    Objective:  Vitals  T(C): 36.6 (12-08-22 @ 09:52), Max: 37.1 (12-07-22 @ 13:00)  HR: 57 (12-08-22 @ 08:00) (55 - 73)  BP: 115/56 (12-08-22 @ 08:00) (91/54 - 128/70)  RR: 22 (12-08-22 @ 06:03) (16 - 25)  SpO2: 98% (12-08-22 @ 08:00) (97% - 100%)    Physical Exam:  General: comfortable, no acute distress, well nourished  HEENT: Atraumatic, no LAD, trachea midline, PERRLA  Cardiovascular: normal s1s2, no murmurs, gallops or fricition rubs  Pulmonary: clear to ausculation Bilaterally, no wheezing , rhonchi  Gastrointestinal: soft non tender non distended, no masses felt, no organomegally  Muscloskeletal: no lower extremity edema, intact bilateral lower extremity pulses  Neurological: CN II-12 intact. No focal weakness  Psychiatrical: normal mood, cooperative  SKIN: no rash, lesions or ulcers    Labs:                          8.9    6.43  )-----------( 145      ( 08 Dec 2022 06:00 )             27.6     12-08    135  |  104  |  23  ----------------------------<  101<H>  3.6   |  26  |  2.94<H>    Ca    8.1<L>      08 Dec 2022 06:00  Phos  2.4     12-08  Mg     2.0     12-08              Active Medications  MEDICATIONS  (STANDING):  atorvastatin 80 milliGRAM(s) Oral at bedtime  budesonide 160 MICROgram(s)/formoterol 4.5 MICROgram(s) Inhaler 2 Puff(s) Inhalation two times a day  cefepime  Injectable.      cefepime  Injectable. 1000 milliGRAM(s) IV Push every 12 hours  chlorhexidine 4% Liquid 1 Application(s) Topical <User Schedule>  dextrose 5%. 1000 milliLiter(s) (50 mL/Hr) IV Continuous <Continuous>  dextrose 5%. 1000 milliLiter(s) (100 mL/Hr) IV Continuous <Continuous>  dextrose 50% Injectable 25 Gram(s) IV Push once  dextrose 50% Injectable 12.5 Gram(s) IV Push once  dextrose 50% Injectable 25 Gram(s) IV Push once  gabapentin 100 milliGRAM(s) Oral three times a day  glucagon  Injectable 1 milliGRAM(s) IntraMuscular once  insulin lispro (ADMELOG) corrective regimen sliding scale   SubCutaneous Before meals and at bedtime  levothyroxine 75 MICROGram(s) Oral daily  midodrine. 5 milliGRAM(s) Oral three times a day  pantoprazole  Injectable 40 milliGRAM(s) IV Push daily  sertraline 100 milliGRAM(s) Oral daily  tiotropium 2.5 MICROgram(s) Inhaler 2 Puff(s) Inhalation daily    MEDICATIONS  (PRN):  albuterol    90 MICROgram(s) HFA Inhaler 2 Puff(s) Inhalation every 6 hours PRN for shortness of breath and/or wheezing  dextrose Oral Gel 15 Gram(s) Oral once PRN Blood Glucose LESS THAN 70 milliGRAM(s)/deciliter

## 2022-12-08 NOTE — PROGRESS NOTE ADULT - ASSESSMENT
A 67 year old female with bleeding per rectum    Plan:    Serial H/H and transfuse if needed  GI possible colonoscopy today  surgery will follow       Plan discussed with colorectal surgery team

## 2022-12-08 NOTE — DISCHARGE NOTE PROVIDER - PROVIDER TOKENS
PROVIDER:[TOKEN:[[id number]],FOLLOWUP:[2 weeks]],PROVIDER:[TOKEN:[[id number]],FOLLOWUP:[2 weeks]]

## 2022-12-08 NOTE — DISCHARGE NOTE PROVIDER - NSDCCPCAREPLAN_GEN_ALL_CORE_FT
PRINCIPAL DISCHARGE DIAGNOSIS  Diagnosis: Hematochezia  Assessment and Plan of Treatment: Likely Diverticular Bleed  GI and Colorectal Surgery were consulted  No plans for inpatient colonscopy  Patient should be on lower fiber diet for two weeks  after two weeks, can start a high fiber diet  HTN: patients antihypertensives were held. Please resume home medication as BP tolerates. she is on midodrine now

## 2022-12-08 NOTE — PROGRESS NOTE ADULT - SUBJECTIVE AND OBJECTIVE BOX
Patient was seen and examined today bedside, the patient denied any pain. One bloody bowel movement overnight. No acute events were reported from nursing staff.      Vital Signs Last 24 Hrs  T(C): 36.8 (08 Dec 2022 06:39), Max: 37.1 (07 Dec 2022 13:00)  T(F): 98.3 (08 Dec 2022 06:39), Max: 98.8 (07 Dec 2022 13:00)  HR: 57 (08 Dec 2022 08:00) (55 - 73)  BP: 115/56 (08 Dec 2022 08:00) (91/54 - 128/70)  BP(mean): 75 (08 Dec 2022 08:00) (65 - 87)  RR: 22 (08 Dec 2022 06:03) (15 - 25)  SpO2: 98% (08 Dec 2022 08:00) (97% - 100%)    Parameters below as of 08 Dec 2022 08:00  Patient On (Oxygen Delivery Method): BiPAP/CPAP                            8.9    6.43  )-----------( 145      ( 08 Dec 2022 06:00 )             27.6     12-08    135  |  104  |  23  ----------------------------<  101<H>  3.6   |  26  |  2.94<H>    Ca    8.1<L>      08 Dec 2022 06:00  Phos  2.4     12-08  Mg     2.0     12-08      I&O's Summary    07 Dec 2022 07:01  -  08 Dec 2022 07:00  --------------------------------------------------------  IN: 370 mL / OUT: 1870 mL / NET: -1500 mL        Culture - Blood (collected 05 Dec 2022 17:10)  Source: .Blood None  Preliminary Report (07 Dec 2022 02:02):    No growth to date.    Culture - Blood (collected 05 Dec 2022 17:10)  Source: .Blood None  Preliminary Report (07 Dec 2022 02:02):    No growth to date.      Physical:  GENERAL: sleepy, confused, lethargic  HEAD:  Atraumatic, Normocephalic  EYES: EOMI, PERRLA, conjunctiva and sclera clear  ENMT: No tonsillar erythema, exudates, or enlargement; Moist mucous membranes  NECK: Supple, No JVD, Normal thyroid  NERVOUS SYSTEM:  A Ox3. Although lethargic, frequently closing eyes. Non-focal. Cn2-12 intact.   CHEST/LUNG: Clear to auscultation bilaterally; No rales, rhonchi, wheezing, or rubs  HEART: Regular rate and rhythm; No murmurs, rubs, or gallops  ABDOMEN: Soft, Nontender, Nondistended; Bowel sounds present  EXTREMITIES:  2+ Peripheral Pulses, No clubbing, cyanosis, or edema

## 2022-12-09 ENCOUNTER — TRANSCRIPTION ENCOUNTER (OUTPATIENT)
Age: 67
End: 2022-12-09

## 2022-12-09 VITALS
HEART RATE: 66 BPM | SYSTOLIC BLOOD PRESSURE: 133 MMHG | DIASTOLIC BLOOD PRESSURE: 61 MMHG | RESPIRATION RATE: 18 BRPM | TEMPERATURE: 99 F | OXYGEN SATURATION: 98 %

## 2022-12-09 LAB
ANION GAP SERPL CALC-SCNC: 3 MMOL/L — LOW (ref 5–17)
BASOPHILS # BLD AUTO: 0.02 K/UL — SIGNIFICANT CHANGE UP (ref 0–0.2)
BASOPHILS NFR BLD AUTO: 0.3 % — SIGNIFICANT CHANGE UP (ref 0–2)
BUN SERPL-MCNC: 34 MG/DL — HIGH (ref 7–23)
CALCIUM SERPL-MCNC: 8.3 MG/DL — LOW (ref 8.5–10.1)
CHLORIDE SERPL-SCNC: 104 MMOL/L — SIGNIFICANT CHANGE UP (ref 96–108)
CO2 SERPL-SCNC: 29 MMOL/L — SIGNIFICANT CHANGE UP (ref 22–31)
CREAT SERPL-MCNC: 4.22 MG/DL — HIGH (ref 0.5–1.3)
EGFR: 11 ML/MIN/1.73M2 — LOW
EOSINOPHIL # BLD AUTO: 0.18 K/UL — SIGNIFICANT CHANGE UP (ref 0–0.5)
EOSINOPHIL NFR BLD AUTO: 2.9 % — SIGNIFICANT CHANGE UP (ref 0–6)
GLUCOSE SERPL-MCNC: 122 MG/DL — HIGH (ref 70–99)
HCT VFR BLD CALC: 30 % — LOW (ref 34.5–45)
HGB BLD-MCNC: 9.6 G/DL — LOW (ref 11.5–15.5)
IMM GRANULOCYTES NFR BLD AUTO: 1.6 % — HIGH (ref 0–0.9)
LYMPHOCYTES # BLD AUTO: 1.44 K/UL — SIGNIFICANT CHANGE UP (ref 1–3.3)
LYMPHOCYTES # BLD AUTO: 23.5 % — SIGNIFICANT CHANGE UP (ref 13–44)
MAGNESIUM SERPL-MCNC: 2.1 MG/DL — SIGNIFICANT CHANGE UP (ref 1.6–2.6)
MCHC RBC-ENTMCNC: 30.2 PG — SIGNIFICANT CHANGE UP (ref 27–34)
MCHC RBC-ENTMCNC: 32 GM/DL — SIGNIFICANT CHANGE UP (ref 32–36)
MCV RBC AUTO: 94.3 FL — SIGNIFICANT CHANGE UP (ref 80–100)
MONOCYTES # BLD AUTO: 0.81 K/UL — SIGNIFICANT CHANGE UP (ref 0–0.9)
MONOCYTES NFR BLD AUTO: 13.2 % — SIGNIFICANT CHANGE UP (ref 2–14)
NEUTROPHILS # BLD AUTO: 3.58 K/UL — SIGNIFICANT CHANGE UP (ref 1.8–7.4)
NEUTROPHILS NFR BLD AUTO: 58.5 % — SIGNIFICANT CHANGE UP (ref 43–77)
PHOSPHATE SERPL-MCNC: 3 MG/DL — SIGNIFICANT CHANGE UP (ref 2.5–4.5)
PLATELET # BLD AUTO: 151 K/UL — SIGNIFICANT CHANGE UP (ref 150–400)
POTASSIUM SERPL-MCNC: 3.8 MMOL/L — SIGNIFICANT CHANGE UP (ref 3.5–5.3)
POTASSIUM SERPL-SCNC: 3.8 MMOL/L — SIGNIFICANT CHANGE UP (ref 3.5–5.3)
RBC # BLD: 3.18 M/UL — LOW (ref 3.8–5.2)
RBC # FLD: 16.7 % — HIGH (ref 10.3–14.5)
SODIUM SERPL-SCNC: 136 MMOL/L — SIGNIFICANT CHANGE UP (ref 135–145)
WBC # BLD: 6.13 K/UL — SIGNIFICANT CHANGE UP (ref 3.8–10.5)
WBC # FLD AUTO: 6.13 K/UL — SIGNIFICANT CHANGE UP (ref 3.8–10.5)

## 2022-12-09 PROCEDURE — 99232 SBSQ HOSP IP/OBS MODERATE 35: CPT

## 2022-12-09 RX ORDER — GABAPENTIN 400 MG/1
1 CAPSULE ORAL
Qty: 0 | Refills: 0 | DISCHARGE
Start: 2022-12-09

## 2022-12-09 RX ORDER — GABAPENTIN 400 MG/1
1 CAPSULE ORAL
Qty: 0 | Refills: 0 | DISCHARGE

## 2022-12-09 RX ORDER — FAMOTIDINE 10 MG/ML
1 INJECTION INTRAVENOUS
Qty: 0 | Refills: 0 | DISCHARGE

## 2022-12-09 RX ADMIN — Medication 75 MICROGRAM(S): at 05:31

## 2022-12-09 RX ADMIN — CHLORHEXIDINE GLUCONATE 1 APPLICATION(S): 213 SOLUTION TOPICAL at 05:46

## 2022-12-09 RX ADMIN — PANTOPRAZOLE SODIUM 40 MILLIGRAM(S): 20 TABLET, DELAYED RELEASE ORAL at 12:07

## 2022-12-09 RX ADMIN — Medication 650 MILLIGRAM(S): at 13:37

## 2022-12-09 RX ADMIN — GABAPENTIN 100 MILLIGRAM(S): 400 CAPSULE ORAL at 05:30

## 2022-12-09 RX ADMIN — GABAPENTIN 100 MILLIGRAM(S): 400 CAPSULE ORAL at 13:36

## 2022-12-09 RX ADMIN — SERTRALINE 100 MILLIGRAM(S): 25 TABLET, FILM COATED ORAL at 12:07

## 2022-12-09 RX ADMIN — MIDODRINE HYDROCHLORIDE 5 MILLIGRAM(S): 2.5 TABLET ORAL at 05:30

## 2022-12-09 RX ADMIN — CEFEPIME 1000 MILLIGRAM(S): 1 INJECTION, POWDER, FOR SOLUTION INTRAMUSCULAR; INTRAVENOUS at 05:29

## 2022-12-09 RX ADMIN — MIDODRINE HYDROCHLORIDE 5 MILLIGRAM(S): 2.5 TABLET ORAL at 12:07

## 2022-12-09 NOTE — PROGRESS NOTE ADULT - ATTENDING COMMENTS
Patient seen while in dialysis. No further bleeding overnight and hgb up top 9.6 today. Pan for discharge home.

## 2022-12-09 NOTE — PROGRESS NOTE ADULT - SUBJECTIVE AND OBJECTIVE BOX
Patient was seen and examined at the bedside this morning  No acute events overnight  Pain is better controlled  No nausea or vomiting  Tolerating diet  no more bloody bowel movements    O/E:  T(C): 37.1 (12-09-22 @ 05:20), Max: 37.1 (12-09-22 @ 05:20)  HR: 68 (12-09-22 @ 10:09) (60 - 83)  BP: 123/60 (12-09-22 @ 10:09) (93/53 - 138/63)  RR: 18 (12-09-22 @ 10:09) (17 - 23)  SpO2: 100% (12-09-22 @ 05:20) (96% - 100%)  Alert, conscious, oriented  No pallor, jaundice or cyanosis  Not in pain or distress  Chest clear, equal air entry bilaterally  Abdomen soft and lax, no tenderness,  Extremities: No swelling or tenderness, Right IJ permacath in place                          9.6    6.13  )-----------( 151      ( 09 Dec 2022 07:36 )             30.0   12-09    136  |  104  |  34<H>  ----------------------------<  122<H>  3.8   |  29  |  4.22<H>    Ca    8.3<L>      09 Dec 2022 07:36  Phos  3.0     12-09  Mg     2.1     12-09    MEDICATIONS  (STANDING):  atorvastatin 80 milliGRAM(s) Oral at bedtime  budesonide 160 MICROgram(s)/formoterol 4.5 MICROgram(s) Inhaler 2 Puff(s) Inhalation two times a day  cefepime  Injectable.      cefepime  Injectable. 1000 milliGRAM(s) IV Push every 12 hours  chlorhexidine 4% Liquid 1 Application(s) Topical <User Schedule>  dextrose 5%. 1000 milliLiter(s) (50 mL/Hr) IV Continuous <Continuous>  dextrose 5%. 1000 milliLiter(s) (100 mL/Hr) IV Continuous <Continuous>  dextrose 50% Injectable 25 Gram(s) IV Push once  dextrose 50% Injectable 12.5 Gram(s) IV Push once  dextrose 50% Injectable 25 Gram(s) IV Push once  gabapentin 100 milliGRAM(s) Oral three times a day  glucagon  Injectable 1 milliGRAM(s) IntraMuscular once  insulin lispro (ADMELOG) corrective regimen sliding scale   SubCutaneous Before meals and at bedtime  levothyroxine 75 MICROGram(s) Oral daily  midodrine. 5 milliGRAM(s) Oral three times a day  pantoprazole  Injectable 40 milliGRAM(s) IV Push daily  sertraline 100 milliGRAM(s) Oral daily  tiotropium 2.5 MICROgram(s) Inhaler 2 Puff(s) Inhalation daily    MEDICATIONS  (PRN):  acetaminophen     Tablet .. 650 milliGRAM(s) Oral every 6 hours PRN Temp greater or equal to 38C (100.4F), Mild Pain (1 - 3)  albuterol    90 MICROgram(s) HFA Inhaler 2 Puff(s) Inhalation every 6 hours PRN for shortness of breath and/or wheezing  dextrose Oral Gel 15 Gram(s) Oral once PRN Blood Glucose LESS THAN 70 milliGRAM(s)/deciliter  diphenhydrAMINE 25 milliGRAM(s) Oral every 4 hours PRN Rash and/or Itching

## 2022-12-09 NOTE — PROGRESS NOTE ADULT - REASON FOR ADMISSION
CC: Rectal bleeding

## 2022-12-09 NOTE — DISCHARGE NOTE NURSING/CASE MANAGEMENT/SOCIAL WORK - NSDCPEFALRISK_GEN_ALL_CORE
For information on Fall & Injury Prevention, visit: [email or web address] OR  [email or web address] OR  https://www.cdc.gov/steadi/patient.html

## 2022-12-09 NOTE — DISCHARGE NOTE NURSING/CASE MANAGEMENT/SOCIAL WORK - PATIENT PORTAL LINK FT
You can access the FollowMyHealth Patient Portal offered by [facility] by registering at the following website: [email or web address]. By joining [employer]’s FollowMyHealth portal, you will also be able to view your health information using other applications (apps) compatible with our system.

## 2022-12-09 NOTE — PROGRESS NOTE ADULT - ASSESSMENT
67  HTN, DM2, COPD ( on 3 L home o2), CAD, HLD, pancreatitis, ESRD dialysis ( M/W/F),  ? presented with bloody stools from [facility] with renal fu of ESRD.    ESRD  -HD TIW, MWF as ordered and complete now  -FU hgb, medicine/gi plan    Anemia  -GI/CRS  services  -dawood at primary unit    COPD  -Nightly bipap, baseline o2  -ICU care    d/w hd rn chair side

## 2022-12-09 NOTE — PROGRESS NOTE ADULT - SUBJECTIVE AND OBJECTIVE BOX
Patient seen and examined   downgraded from ICU  feels well  some bloody stool overnight  Hb noted at 8.9  patient seen by CRC and GI: colonoscopy on hold at this time    12/09: Seen and eval. Currently getting HD. No bloody BMs. H&H stable     Review of Systems:  General:denies fever chills, headache, weakness  HEENT: denies blurry vision,diffculty swallowing, difficulty hearing, tinnitus  Cardiovascular: denies chest pain  ,palpitations  Pulmonary:denies shortness of breath, cough, wheezing, hemoptysis  Gastrointestinal: denies abdominal pain, constipation, diarrhea,nausea , vomiting, hematochezia  : denies hematuria, dysuria, or incontinence  Neurological: denies weakness, numbness , tingling, dizziness, tremors  MSK: denies muscle pain, difficulty ambulating, swelling, back pain  skin: denies skin rash, itching, burning, or  skin lesions  Psychiatrical: denies mood disturbances, anxierty, feeling depressed, depression , or difficulty sleeping    Objective:  Vital Signs Last 24 Hrs  T(C): 37 (09 Dec 2022 16:11), Max: 37.1 (09 Dec 2022 05:20)  T(F): 98.6 (09 Dec 2022 16:11), Max: 98.7 (09 Dec 2022 05:20)  HR: 66 (09 Dec 2022 16:11) (60 - 72)  BP: 133/61 (09 Dec 2022 16:11) (114/57 - 139/63)  BP(mean): --  RR: 18 (09 Dec 2022 16:11) (17 - 18)  SpO2: 98% (09 Dec 2022 16:11) (98% - 100%)    Parameters below as of 09 Dec 2022 16:11  Patient On (Oxygen Delivery Method): nasal cannula  O2 Flow (L/min): 2    Physical Exam:  General: comfortable, no acute distress, well nourished  HEENT: Atraumatic, no LAD, trachea midline, PERRLA  Cardiovascular: normal s1s2, no murmurs, gallops or fricition rubs  Pulmonary: clear to ausculation Bilaterally, no wheezing , rhonchi  Gastrointestinal: soft non tender non distended, no masses felt, no organomegally  Muscloskeletal: no lower extremity edema, intact bilateral lower extremity pulses  Neurological: CN II-12 intact. No focal weakness  Psychiatrical: normal mood, cooperative  SKIN: no rash, lesions or ulcers    Labs:                        9.6    6.13  )-----------( 151      ( 09 Dec 2022 07:36 )             30.0     12-09    136  |  104  |  34<H>  ----------------------------<  122<H>  3.8   |  29  |  4.22<H>    Ca    8.3<L>      09 Dec 2022 07:36  Phos  3.0     12-09  Mg     2.1     12-09                  Active Medications  MEDICATIONS  (STANDING):  atorvastatin 80 milliGRAM(s) Oral at bedtime  budesonide 160 MICROgram(s)/formoterol 4.5 MICROgram(s) Inhaler 2 Puff(s) Inhalation two times a day  cefepime  Injectable.      cefepime  Injectable. 1000 milliGRAM(s) IV Push every 12 hours  chlorhexidine 4% Liquid 1 Application(s) Topical <User Schedule>  dextrose 5%. 1000 milliLiter(s) (50 mL/Hr) IV Continuous <Continuous>  dextrose 5%. 1000 milliLiter(s) (100 mL/Hr) IV Continuous <Continuous>  dextrose 50% Injectable 25 Gram(s) IV Push once  dextrose 50% Injectable 12.5 Gram(s) IV Push once  dextrose 50% Injectable 25 Gram(s) IV Push once  gabapentin 100 milliGRAM(s) Oral three times a day  glucagon  Injectable 1 milliGRAM(s) IntraMuscular once  insulin lispro (ADMELOG) corrective regimen sliding scale   SubCutaneous Before meals and at bedtime  levothyroxine 75 MICROGram(s) Oral daily  midodrine. 5 milliGRAM(s) Oral three times a day  pantoprazole  Injectable 40 milliGRAM(s) IV Push daily  sertraline 100 milliGRAM(s) Oral daily  tiotropium 2.5 MICROgram(s) Inhaler 2 Puff(s) Inhalation daily    MEDICATIONS  (PRN):  albuterol    90 MICROgram(s) HFA Inhaler 2 Puff(s) Inhalation every 6 hours PRN for shortness of breath and/or wheezing  dextrose Oral Gel 15 Gram(s) Oral once PRN Blood Glucose LESS THAN 70 milliGRAM(s)/deciliter

## 2022-12-09 NOTE — PROGRESS NOTE ADULT - ASSESSMENT
A 67 year old female with bleeding per rectum  No more bleeding    Plan:  Serial H/H and transfuse if needed  GI possible colonoscopy today  Patient can be discharged from surgery standpoint       Plan discussed with colorectal surgery team

## 2022-12-09 NOTE — PROGRESS NOTE ADULT - PROVIDER SPECIALTY LIST ADULT
Nephrology
SICU
Colorectal Surgery
Gastroenterology
Colorectal Surgery
Colorectal Surgery
Gastroenterology
Nephrology
Colorectal Surgery
Hospitalist
Internal Medicine
SICU

## 2022-12-09 NOTE — PROGRESS NOTE ADULT - SUBJECTIVE AND OBJECTIVE BOX
Patient is a 67y Female who reports no complaints as new.     MEDICATIONS  (STANDING):  atorvastatin 80 milliGRAM(s) Oral at bedtime  budesonide 160 MICROgram(s)/formoterol 4.5 MICROgram(s) Inhaler 2 Puff(s) Inhalation two times a day  cefepime  Injectable.      cefepime  Injectable. 1000 milliGRAM(s) IV Push every 12 hours  chlorhexidine 4% Liquid 1 Application(s) Topical <User Schedule>  dextrose 5%. 1000 milliLiter(s) (100 mL/Hr) IV Continuous <Continuous>  dextrose 5%. 1000 milliLiter(s) (50 mL/Hr) IV Continuous <Continuous>  dextrose 50% Injectable 25 Gram(s) IV Push once  dextrose 50% Injectable 12.5 Gram(s) IV Push once  dextrose 50% Injectable 25 Gram(s) IV Push once  gabapentin 100 milliGRAM(s) Oral three times a day  glucagon  Injectable 1 milliGRAM(s) IntraMuscular once  insulin lispro (ADMELOG) corrective regimen sliding scale   SubCutaneous Before meals and at bedtime  levothyroxine 75 MICROGram(s) Oral daily  midodrine. 5 milliGRAM(s) Oral three times a day  pantoprazole  Injectable 40 milliGRAM(s) IV Push daily  sertraline 100 milliGRAM(s) Oral daily  tiotropium 2.5 MICROgram(s) Inhaler 2 Puff(s) Inhalation daily    MEDICATIONS  (PRN):  acetaminophen     Tablet .. 650 milliGRAM(s) Oral every 6 hours PRN Temp greater or equal to 38C (100.4F), Mild Pain (1 - 3)  albuterol    90 MICROgram(s) HFA Inhaler 2 Puff(s) Inhalation every 6 hours PRN for shortness of breath and/or wheezing  dextrose Oral Gel 15 Gram(s) Oral once PRN Blood Glucose LESS THAN 70 milliGRAM(s)/deciliter  diphenhydrAMINE 25 milliGRAM(s) Oral every 4 hours PRN Rash and/or Itching        T(C): , Max: 37.1 (12-09-22 @ 05:20)  T(F): , Max: 98.7 (12-09-22 @ 05:20)  HR: 66 (12-09-22 @ 16:11)  BP: 133/61 (12-09-22 @ 16:11)  BP(mean): --  RR: 18 (12-09-22 @ 16:11)  SpO2: 98% (12-09-22 @ 16:11)  Wt(kg): --        PHYSICAL EXAM:    Constitutional: NAD  HEENT: MM  dist  Cardiovascular: S1 and S2  Extremities: No peripheral edema  Neurological: A/O x 3, no focal deficits  Psychiatric: Normal mood, normal affect  : No Thompson  Skin: No rashes  Access: tdc        LABS:                        9.6    6.13  )-----------( 151      ( 09 Dec 2022 07:36 )             30.0     09 Dec 2022 07:36    136    |  104    |  34     ----------------------------<  122    3.8     |  29     |  4.22   08 Dec 2022 06:00    135    |  104    |  23     ----------------------------<  101    3.6     |  26     |  2.94   07 Dec 2022 06:15    133    |  103    |  35     ----------------------------<  134    4.0     |  26     |  3.94   06 Dec 2022 09:20    136    |  103    |  29     ----------------------------<  132    4.4     |  28     |  4.27     Ca    8.3        09 Dec 2022 07:36  Ca    8.1        08 Dec 2022 06:00  Ca    8.6        07 Dec 2022 06:15  Ca    8.4        06 Dec 2022 09:20  Phos  3.0       09 Dec 2022 07:36  Phos  2.4       08 Dec 2022 06:00  Phos  3.9       07 Dec 2022 06:15  Mg     2.1       09 Dec 2022 07:36  Mg     2.0       08 Dec 2022 06:00  Mg     2.3       07 Dec 2022 06:15  Mg     2.4       06 Dec 2022 09:20            Urine Studies:          RADIOLOGY & ADDITIONAL STUDIES:

## 2022-12-09 NOTE — PROGRESS NOTE ADULT - ASSESSMENT
66 yo female with PMHx of HTN, DM2, COPD ( on 3 L home o2), CAD, HLD, pancreatitis, ESRD dialysis ( M/W/F), questionable communication deficits at baseline ? Presented from [facility] for bloody stools after HD session hypotensive, Anemic Hgb 7.1. Initially admitted to ICU for acute hypercapnic failure due to COPD and Home BIpap non compliance, acute management of acute blood anemia with lower Gi Bleed. Patient now downgraded to medicine.    Lower GI Bleed likely diverticular  CTAP with IV contrast revealing no active bleed. but moderate distention of rectum and rectosigmoid colon with high attenuation material, possible duodenal lipoma and nonspecific proctitis. Also showing bibasilar opacities PNA vs atelectasis.   In ED received 1u pRBC, started on cefepime for suspected HCAP, and placed on bipap  evaluated by colorectal surgery and GI  s/p 2u pRBC in ICU.  hb 8.9-->9.6  plan  tolerating diet. H&H stable. Ok to discharge, per CRC and GI     acute hypercapnic respiratory failure 2/2 PNA vs noncompliance with nocturnal bipap / COPD with chronic respiratory failure  s/p cefepime and Bipap in the ICU  on 3L NC sating 97%. resumed symbicort, spiriva, alb inh.     ESRD on HD MWF,     Other chronic morbidities: HTN, DM2, HLD  taper down midodrine  advance diet, coverage with insulin    PLAN  - BP better and stable. cont home dose midodrine 5mg tid. cont to hold anti-HTN for now. hold home diuretics  - no chemical DVT ppx in setting of bleeding. cont SCDs    PT eval     discharge today

## 2022-12-11 LAB
CULTURE RESULTS: SIGNIFICANT CHANGE UP
CULTURE RESULTS: SIGNIFICANT CHANGE UP
SPECIMEN SOURCE: SIGNIFICANT CHANGE UP
SPECIMEN SOURCE: SIGNIFICANT CHANGE UP

## 2022-12-13 ENCOUNTER — EMERGENCY (EMERGENCY)
Facility: HOSPITAL | Age: 67
LOS: 0 days | Discharge: HOME | End: 2022-12-13
Attending: STUDENT IN AN ORGANIZED HEALTH CARE EDUCATION/TRAINING PROGRAM
Payer: MEDICARE

## 2022-12-13 VITALS
DIASTOLIC BLOOD PRESSURE: 67 MMHG | SYSTOLIC BLOOD PRESSURE: 123 MMHG | OXYGEN SATURATION: 96 % | HEART RATE: 77 BPM | TEMPERATURE: 98 F | RESPIRATION RATE: 18 BRPM

## 2022-12-13 VITALS
HEIGHT: 65 IN | RESPIRATION RATE: 21 BRPM | WEIGHT: 197.98 LBS | SYSTOLIC BLOOD PRESSURE: 139 MMHG | OXYGEN SATURATION: 100 % | TEMPERATURE: 98 F | DIASTOLIC BLOOD PRESSURE: 88 MMHG | HEART RATE: 112 BPM

## 2022-12-13 DIAGNOSIS — Z99.81 DEPENDENCE ON SUPPLEMENTAL OXYGEN: ICD-10-CM

## 2022-12-13 LAB
ALBUMIN SERPL ELPH-MCNC: 2.5 G/DL — LOW (ref 3.3–5)
ANION GAP SERPL CALC-SCNC: 7 MMOL/L — SIGNIFICANT CHANGE UP (ref 5–17)
BASOPHILS # BLD AUTO: 0.02 K/UL — SIGNIFICANT CHANGE UP (ref 0–0.2)
BASOPHILS NFR BLD AUTO: 0.3 % — SIGNIFICANT CHANGE UP (ref 0–2)
BUN SERPL-MCNC: 45 MG/DL — HIGH (ref 7–23)
CALCIUM SERPL-MCNC: 8.7 MG/DL — SIGNIFICANT CHANGE UP (ref 8.5–10.1)
CHLORIDE SERPL-SCNC: 103 MMOL/L — SIGNIFICANT CHANGE UP (ref 96–108)
CO2 SERPL-SCNC: 27 MMOL/L — SIGNIFICANT CHANGE UP (ref 22–31)
CREAT SERPL-MCNC: 6.29 MG/DL — HIGH (ref 0.5–1.3)
EGFR: 7 ML/MIN/1.73M2 — LOW
EOSINOPHIL # BLD AUTO: 0.17 K/UL — SIGNIFICANT CHANGE UP (ref 0–0.5)
EOSINOPHIL NFR BLD AUTO: 2.7 % — SIGNIFICANT CHANGE UP (ref 0–6)
GLUCOSE SERPL-MCNC: 148 MG/DL — HIGH (ref 70–99)
HCT VFR BLD CALC: 31.4 % — LOW (ref 34.5–45)
HGB BLD-MCNC: 9.6 G/DL — LOW (ref 11.5–15.5)
IMM GRANULOCYTES NFR BLD AUTO: 0.8 % — SIGNIFICANT CHANGE UP (ref 0–0.9)
LYMPHOCYTES # BLD AUTO: 1.85 K/UL — SIGNIFICANT CHANGE UP (ref 1–3.3)
LYMPHOCYTES # BLD AUTO: 29 % — SIGNIFICANT CHANGE UP (ref 13–44)
MCHC RBC-ENTMCNC: 30 PG — SIGNIFICANT CHANGE UP (ref 27–34)
MCHC RBC-ENTMCNC: 30.6 GM/DL — LOW (ref 32–36)
MCV RBC AUTO: 98.1 FL — SIGNIFICANT CHANGE UP (ref 80–100)
MONOCYTES # BLD AUTO: 0.77 K/UL — SIGNIFICANT CHANGE UP (ref 0–0.9)
MONOCYTES NFR BLD AUTO: 12.1 % — SIGNIFICANT CHANGE UP (ref 2–14)
NEUTROPHILS # BLD AUTO: 3.53 K/UL — SIGNIFICANT CHANGE UP (ref 1.8–7.4)
NEUTROPHILS NFR BLD AUTO: 55.1 % — SIGNIFICANT CHANGE UP (ref 43–77)
PHOSPHATE SERPL-MCNC: 6.3 MG/DL — HIGH (ref 2.5–4.5)
PLATELET # BLD AUTO: 133 K/UL — LOW (ref 150–400)
POTASSIUM SERPL-MCNC: 4.4 MMOL/L — SIGNIFICANT CHANGE UP (ref 3.5–5.3)
POTASSIUM SERPL-SCNC: 4.4 MMOL/L — SIGNIFICANT CHANGE UP (ref 3.5–5.3)
RBC # BLD: 3.2 M/UL — LOW (ref 3.8–5.2)
RBC # FLD: 16.6 % — HIGH (ref 10.3–14.5)
SARS-COV-2 RNA SPEC QL NAA+PROBE: SIGNIFICANT CHANGE UP
SODIUM SERPL-SCNC: 137 MMOL/L — SIGNIFICANT CHANGE UP (ref 135–145)
WBC # BLD: 6.39 K/UL — SIGNIFICANT CHANGE UP (ref 3.8–10.5)
WBC # FLD AUTO: 6.39 K/UL — SIGNIFICANT CHANGE UP (ref 3.8–10.5)

## 2022-12-13 PROCEDURE — 87521 HEPATITIS C PROBE&RVRS TRNSC: CPT

## 2022-12-13 PROCEDURE — 99284 EMERGENCY DEPT VISIT MOD MDM: CPT

## 2022-12-13 PROCEDURE — 80074 ACUTE HEPATITIS PANEL: CPT

## 2022-12-13 PROCEDURE — 90999 UNLISTED DIALYSIS PROCEDURE: CPT

## 2022-12-13 PROCEDURE — 87635 SARS-COV-2 COVID-19 AMP PRB: CPT

## 2022-12-13 PROCEDURE — 85025 COMPLETE CBC W/AUTO DIFF WBC: CPT

## 2022-12-13 PROCEDURE — 36415 COLL VENOUS BLD VENIPUNCTURE: CPT

## 2022-12-13 PROCEDURE — 80069 RENAL FUNCTION PANEL: CPT

## 2022-12-13 RX ORDER — ACETAMINOPHEN 500 MG
650 TABLET ORAL ONCE
Refills: 0 | Status: DISCONTINUED | OUTPATIENT
Start: 2022-12-13 | End: 2022-12-13

## 2022-12-13 NOTE — ED ADULT NURSE NOTE - OBJECTIVE STATEMENT
patient axox3, requesting hemodialysis. patient normally gets dialysis M/W/F but missed session yesterday as her ambulance did not come. patient denies headache, vision changes, n/v/d, fever, chills, cough, chest pain, SOB. hx COPD, on 3L NC at baseline, remains on 3L NC in ED.

## 2022-12-13 NOTE — PROGRESS NOTE ADULT - ASSESSMENT
67  HTN, DM2, COPD ( on 3 L home o2), CAD, HLD, pancreatitis, ESRD dialysis ( M/W/F),  ? presented with bloody stools from [facility] [place], operated by [facility] with renal fu of ESRD.    ESRD  -HD TIW, missed HD so treatment complete now as ordered  -K protocol    Anemia  -HGB ok    COPD  -Nightly bipap, baseline o2       d/w hd rn chair side and ED attending

## 2022-12-13 NOTE — ED PROVIDER NOTE - PROGRESS NOTE DETAILS
Erica Stephens for attending Dr. Thorne: Spoke to Dr. Roberts. Will arrange for dialysis. Erica Stephens for attending Dr. Thorne: Signed out to Dr. Sanz pending completion of dialysis. After dialysis, pt to be d/c back to facility

## 2022-12-13 NOTE — ED PROVIDER NOTE - PATIENT PORTAL LINK FT
You can access the FollowMyHealth Patient Portal offered by [facility] by registering at the following website: [email or web address]. By joining [employer]’s FollowMyHealth portal, you will also be able to view your health information using other applications (apps) compatible with our system.

## 2022-12-13 NOTE — ED ADULT NURSE NOTE - COVID-19 ORDERING FACILITY
Massage Therapy Progress Note      Visit type: Office Visit    Massage Therapy    Length of treatment: 30-minute    Authorization: Patient request    Reviewed contraindications/current health status with Patient    No Contraindications to massage therapy exist    Subjective:  Patient complains of: \"same thing as normal- no new complaints\" Stiffness/tension in  Neck, Shoulders and Upper back    Pain report prior to treatment:  Pain relief not a goal of treatment    Type of treatment requested: Wellness massage    Specific requests:  Upper body- posterior only       Objective Observations:  Hypertonicity noted in:  Cervical paraspinals Bilateral, Upper trapezius Bilateral and Levator scapula Bilateral    Hypotonicity/Ischemia noted in: None noted    Decreased ROM noted in No areas    Additional observations:  Forward head posture, Rounded shoulders, Pleasant affect and Talkative    Assessment:  Patient received Trigger point therapy, Swedish techniques and Myofascial release to affected tissues.    Additional treatment provided: None.    Response to treatment: Hypertonicity in upper back and neck  noted tissues decreased, Patient gave positive verbal feedback and Patient relaxed easily    Pain report after treatment:Pain relief not a goal of treatment    Education/Resources: None provided this visit    Outcomes:n/a    Plan/Recommendations:  Increase water consumption  Massage therapy as desired  Session concluded  
[facility]

## 2022-12-13 NOTE — PROGRESS NOTE ADULT - SUBJECTIVE AND OBJECTIVE BOX
Patient is a 67y Female here after missed HD. Ride did not come yesterday reported. Patient last HD here at       MEDICATIONS  (STANDING):    MEDICATIONS  (PRN):        T(C): , Max: 36.8 (12-13-22 @ 09:04)  T(F): , Max: 98.3 (12-13-22 @ 09:04)  HR: 74 (12-13-22 @ 13:11)  BP: 151/83 (12-13-22 @ 13:11)  BP(mean): --  RR: 19 (12-13-22 @ 13:11)  SpO2: 100% (12-13-22 @ 09:04)  Wt(kg): --    Height (cm): 165.1 (12-13 @ 09:04)  Weight (kg): 89.8 (12-13 @ 09:04)  BMI (kg/m2): 32.9 (12-13 @ 09:04)  BSA (m2): 1.97 (12-13 @ 09:04)    PHYSICAL EXAM:    Constitutional: NAD   HEENT:  MM  dist  Cardiovascular: S1 and S2   Gastrointestinal: BS+, soft, NT/ND  Extremities: chronic peripheral edema  Neurological: Alert  TDC        LABS:                        9.6    6.39  )-----------( 133      ( 13 Dec 2022 13:09 )             31.4                 Urine Studies:          RADIOLOGY & ADDITIONAL STUDIES:

## 2022-12-13 NOTE — ED PROVIDER NOTE - OBJECTIVE STATEMENT
66 y/o female with a PMHx of asthma with COPD on 3L home O2, CHF, ESRD on dialysis (M/W/F), DM, HTN, HLD presents to the ED for dialysis. Pt states her transportation never came to take her to dialysis yesterday. Denies SOB. Last dialysis 12/9. No other complaints at this time.

## 2022-12-13 NOTE — ED ADULT NURSE NOTE - NSIMPLEMENTINTERV_GEN_ALL_ED
Implemented All Fall Risk Interventions:  [place] to call system. Call bell, personal items and telephone within reach. Instruct patient to call for assistance. Room bathroom lighting operational. Non-slip footwear when patient is off stretcher. Physically safe environment: no spills, clutter or unnecessary equipment. Stretcher in lowest position, wheels locked, appropriate side rails in place. Provide visual cue, wrist band, yellow gown, etc. Monitor gait and stability. Monitor for mental status changes and reorient to person, place, and time. Review medications for side effects contributing to fall risk. Reinforce activity limits and safety measures with patient and family.

## 2022-12-13 NOTE — ED PROVIDER NOTE - MUSCULOSKELETAL, MLM
Spine appears normal, range of motion is not limited, no muscle or joint tenderness. Right chest wall catheter.

## 2022-12-13 NOTE — ED ADULT TRIAGE NOTE - CHIEF COMPLAINT QUOTE
Patient presents with EMS requesting dialysis. Patient from group home and missed her treatment yesterday. Denies complaints
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
## 2022-12-14 DIAGNOSIS — E11.22 TYPE 2 DIABETES MELLITUS WITH DIABETIC CHRONIC KIDNEY DISEASE: ICD-10-CM

## 2022-12-14 DIAGNOSIS — J44.9 CHRONIC OBSTRUCTIVE PULMONARY DISEASE, UNSPECIFIED: ICD-10-CM

## 2022-12-14 DIAGNOSIS — Z99.2 DEPENDENCE ON RENAL DIALYSIS: ICD-10-CM

## 2022-12-14 DIAGNOSIS — I12.0 HYPERTENSIVE CHRONIC KIDNEY DISEASE WITH STAGE 5 CHRONIC KIDNEY DISEASE OR END STAGE RENAL DISEASE: ICD-10-CM

## 2022-12-14 DIAGNOSIS — J96.22 ACUTE AND CHRONIC RESPIRATORY FAILURE WITH HYPERCAPNIA: ICD-10-CM

## 2022-12-14 DIAGNOSIS — J96.21 ACUTE AND CHRONIC RESPIRATORY FAILURE WITH HYPOXIA: ICD-10-CM

## 2022-12-14 DIAGNOSIS — N18.6 END STAGE RENAL DISEASE: ICD-10-CM

## 2022-12-14 DIAGNOSIS — Z79.82 LONG TERM (CURRENT) USE OF ASPIRIN: ICD-10-CM

## 2022-12-14 DIAGNOSIS — Z79.899 OTHER LONG TERM (CURRENT) DRUG THERAPY: ICD-10-CM

## 2022-12-14 DIAGNOSIS — K57.91 DIVERTICULOSIS OF INTESTINE, PART UNSPECIFIED, WITHOUT PERFORATION OR ABSCESS WITH BLEEDING: ICD-10-CM

## 2022-12-14 DIAGNOSIS — I50.9 HEART FAILURE, UNSPECIFIED: ICD-10-CM

## 2022-12-14 DIAGNOSIS — I25.10 ATHEROSCLEROTIC HEART DISEASE OF NATIVE CORONARY ARTERY WITHOUT ANGINA PECTORIS: ICD-10-CM

## 2022-12-14 DIAGNOSIS — I13.2 HYPERTENSIVE HEART AND CHRONIC KIDNEY DISEASE WITH HEART FAILURE AND WITH STAGE 5 CHRONIC KIDNEY DISEASE, OR END STAGE RENAL DISEASE: ICD-10-CM

## 2022-12-14 DIAGNOSIS — E78.5 HYPERLIPIDEMIA, UNSPECIFIED: ICD-10-CM

## 2022-12-14 DIAGNOSIS — J18.9 PNEUMONIA, UNSPECIFIED ORGANISM: ICD-10-CM

## 2022-12-14 DIAGNOSIS — Z91.018 ALLERGY TO OTHER FOODS: ICD-10-CM

## 2022-12-14 DIAGNOSIS — D62 ACUTE POSTHEMORRHAGIC ANEMIA: ICD-10-CM

## 2022-12-14 DIAGNOSIS — Z99.81 DEPENDENCE ON SUPPLEMENTAL OXYGEN: ICD-10-CM

## 2022-12-14 DIAGNOSIS — I95.9 HYPOTENSION, UNSPECIFIED: ICD-10-CM

## 2022-12-14 DIAGNOSIS — Z20.822 CONTACT WITH AND (SUSPECTED) EXPOSURE TO COVID-19: ICD-10-CM

## 2022-12-14 DIAGNOSIS — Z79.4 LONG TERM (CURRENT) USE OF INSULIN: ICD-10-CM

## 2022-12-14 LAB
HAV IGM SER-ACNC: SIGNIFICANT CHANGE UP
HBV CORE IGM SER-ACNC: SIGNIFICANT CHANGE UP
HBV SURFACE AG SER-ACNC: SIGNIFICANT CHANGE UP
HCV AB S/CO SERPL IA: 1.55 S/CO — HIGH (ref 0–0.99)
HCV AB SERPL-IMP: ABNORMAL

## 2023-01-26 ENCOUNTER — EMERGENCY (EMERGENCY)
Facility: HOSPITAL | Age: 68
LOS: 0 days | Discharge: HOME | End: 2023-01-26
Attending: EMERGENCY MEDICINE
Payer: MEDICARE

## 2023-01-26 VITALS
TEMPERATURE: 98 F | WEIGHT: 179.9 LBS | DIASTOLIC BLOOD PRESSURE: 80 MMHG | HEART RATE: 66 BPM | HEIGHT: 65 IN | RESPIRATION RATE: 18 BRPM | OXYGEN SATURATION: 95 % | SYSTOLIC BLOOD PRESSURE: 137 MMHG

## 2023-01-26 VITALS
SYSTOLIC BLOOD PRESSURE: 141 MMHG | OXYGEN SATURATION: 96 % | RESPIRATION RATE: 17 BRPM | DIASTOLIC BLOOD PRESSURE: 77 MMHG | TEMPERATURE: 98 F | HEART RATE: 62 BPM

## 2023-01-26 DIAGNOSIS — S27.329A CONTUSION OF LUNG, UNSPECIFIED, INITIAL ENCOUNTER: ICD-10-CM
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
## 2023-01-26 DIAGNOSIS — E78.5 HYPERLIPIDEMIA, UNSPECIFIED: ICD-10-CM

## 2023-01-26 DIAGNOSIS — M25.512 PAIN IN LEFT SHOULDER: ICD-10-CM

## 2023-01-26 DIAGNOSIS — M25.552 PAIN IN LEFT HIP: ICD-10-CM

## 2023-01-26 DIAGNOSIS — M79.662 PAIN IN LEFT LOWER LEG: ICD-10-CM

## 2023-01-26 DIAGNOSIS — M54.50 LOW BACK PAIN, UNSPECIFIED: ICD-10-CM

## 2023-01-26 DIAGNOSIS — S20.212A CONTUSION OF LEFT FRONT WALL OF THORAX, INITIAL ENCOUNTER: ICD-10-CM

## 2023-01-26 DIAGNOSIS — S09.90XA UNSPECIFIED INJURY OF HEAD, INITIAL ENCOUNTER: ICD-10-CM

## 2023-01-26 DIAGNOSIS — Z79.82 LONG TERM (CURRENT) USE OF ASPIRIN: ICD-10-CM

## 2023-01-26 DIAGNOSIS — I11.0 HYPERTENSIVE HEART DISEASE WITH HEART FAILURE: ICD-10-CM
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
## 2023-01-26 DIAGNOSIS — I50.9 HEART FAILURE, UNSPECIFIED: ICD-10-CM

## 2023-01-26 DIAGNOSIS — E11.9 TYPE 2 DIABETES MELLITUS WITHOUT COMPLICATIONS: ICD-10-CM

## 2023-01-26 DIAGNOSIS — Z91.018 ALLERGY TO OTHER FOODS: ICD-10-CM

## 2023-01-26 DIAGNOSIS — J45.909 UNSPECIFIED ASTHMA, UNCOMPLICATED: ICD-10-CM
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
## 2023-01-26 PROCEDURE — 73590 X-RAY EXAM OF LOWER LEG: CPT | Mod: 26,50

## 2023-01-26 PROCEDURE — 73552 X-RAY EXAM OF FEMUR 2/>: CPT | Mod: LT

## 2023-01-26 PROCEDURE — 72125 CT NECK SPINE W/O DYE: CPT | Mod: MA

## 2023-01-26 PROCEDURE — 74176 CT ABD & PELVIS W/O CONTRAST: CPT | Mod: MA

## 2023-01-26 PROCEDURE — 74176 CT ABD & PELVIS W/O CONTRAST: CPT | Mod: 26,MA

## 2023-01-26 PROCEDURE — 70450 CT HEAD/BRAIN W/O DYE: CPT | Mod: 26,MA

## 2023-01-26 PROCEDURE — 73552 X-RAY EXAM OF FEMUR 2/>: CPT | Mod: 26,LT

## 2023-01-26 PROCEDURE — 73590 X-RAY EXAM OF LOWER LEG: CPT | Mod: 50

## 2023-01-26 PROCEDURE — 71250 CT THORAX DX C-: CPT | Mod: 26,MA

## 2023-01-26 PROCEDURE — 70450 CT HEAD/BRAIN W/O DYE: CPT | Mod: MA

## 2023-01-26 PROCEDURE — 73502 X-RAY EXAM HIP UNI 2-3 VIEWS: CPT | Mod: 26,LT

## 2023-01-26 PROCEDURE — 72125 CT NECK SPINE W/O DYE: CPT | Mod: 26,MA

## 2023-01-26 PROCEDURE — 99284 EMERGENCY DEPT VISIT MOD MDM: CPT | Mod: 25

## 2023-01-26 PROCEDURE — 73502 X-RAY EXAM HIP UNI 2-3 VIEWS: CPT | Mod: LT

## 2023-01-26 PROCEDURE — 71250 CT THORAX DX C-: CPT | Mod: MA

## 2023-01-26 PROCEDURE — 99285 EMERGENCY DEPT VISIT HI MDM: CPT

## 2023-01-26 NOTE — ED PROVIDER NOTE - NEUROLOGICAL, MLM
Alert and oriented, no focal deficits, CN nerves intact, nml speech, no obvious focal deficits, Alert and oriented, no focal deficits, CN nerves intact, nml speech, no obvious focal deficits,  CNs intact

## 2023-01-26 NOTE — ED PROVIDER NOTE - WR ORDER DATE AND TIME 2
PAST MEDICAL HISTORY:  Acromegaly     CAD (coronary atherosclerotic disease)     DM (diabetes mellitus), type 2     Dyslipidemia     ESRD on dialysis     HTN (hypertension)     Neuropathy 26-Jan-2023 15:25

## 2023-01-26 NOTE — ED PROVIDER NOTE - GASTROINTESTINAL, MLM
mild tenderness at epigastric and L abdomen, no guarding, no rebound, and no obvious mass, bowel sounds +
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
## 2023-01-26 NOTE — ED PROVIDER NOTE - NSFOLLOWUPINSTRUCTIONS_ED_ALL_ED_FT
Tylenol 325 mg 2 tabs every 6 hours as needed for headache, aches & pains.  Continue your regular medications as per routine.  Don't miss your dialysis session tomorrow.  Follow up with your own doctor(s).      Closed Head Injury    A closed head injury is an injury to your head that may or may not involve a traumatic brain injury (TBI). Symptoms of TBI can be short or long lasting and include headache, dizziness, interference with memory or speech, fatigue, confusion, changes in sleep, mood changes, nausea, depression/anxiety, and dulling of senses. Make sure to obtain proper rest which includes getting plenty of sleep, avoiding excessive visual stimulation, and avoiding activities that may cause physical or mental stress. Avoid any situation where there is potential for another head injury, including sports.    SEEK IMMEDIATE MEDICAL CARE IF YOU HAVE ANY OF THE FOLLOWING SYMPTOMS: unusual drowsiness, vomiting, severe dizziness, seizures, lightheadedness, muscular weakness, different pupil sizes, visual changes, or clear or bloody discharge from your ears or nose.            Contusion    A contusion is a deep bruise. Contusions are the result of a blunt injury to tissues and muscle fibers under the skin. The skin overlying the contusion may turn blue, purple, or yellow. Symptoms also include pain and swelling in the injured area.    SEEK IMMEDIATE MEDICAL CARE IF YOU HAVE ANY OF THE FOLLOWING SYMPTOMS: severe pain, numbness, tingling, pain, weakness, or skin color/temperature change in any part of your body distal to the injury.
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
## 2023-01-26 NOTE — ED PROVIDER NOTE - MUSCULOSKELETAL, MLM
Neck: no midline tenderness, + L trapezius muscle tenderness, supple with L trapezius muscle pain. + anterior chest wall tender without obvious deformity. L lateral ribs tender without obvious deformity. Pelvis: nontender, stable.  No midline lumbar tenderness + L para lumbar tender, L posterior pelvis/posterior hip tender. L leg, DP pulse good. L foot + charcoal deformity nonacute per pt. L hip lateral greater than anterior tenderness, lower thigh L nontender, L foreleg +shin tender with deformity/swelling, R hip nontender, able to bend witth pain referred to back. no focal tenderness, R lower leg + soft tissue swelling anterior surface, nontender, R DP pulse nml Neck: no midline tenderness, + L trapezius muscle tenderness, supple with L trapezius muscle pain. + anterior chest wall tender without obvious deformity. L lateral ribs tender without obvious deformity. Pelvis: nontender, stable.  No midline lumbar tenderness + L para lumbar tender, L posterior pelvis/posterior hip tender. L leg, DP pulse good. L foot + Charcot deformity non-acute per pt. L hip lateral greater than anterior tenderness, lower thigh L nontender, L foreleg +shin tender with no deformity/swelling, R hip nontender, able to bend with pain referred to back. no focal tenderness, R lower leg + soft tissue swelling anterior surface, nontender, R DP pulse nml

## 2023-01-26 NOTE — ED PROVIDER NOTE - ENMT, MLM
Airway patent, Nasal mucosa clear. MM mildly dry. Oropharynx clear. Head: NC/AT, battles negative, no clinical evidence of facial injury
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
## 2023-01-26 NOTE — ED PROVIDER NOTE - CARE PLAN
1 Principal Discharge DX:	Contusion of multiple sites of left leg  Secondary Diagnosis:	Contusion of chest wall, initial encounter  Secondary Diagnosis:	Head injury, closed, without LOC, initial encounter

## 2023-01-31 RX ORDER — LIDOCAINE 4 G/100G
1 CREAM TOPICAL
Qty: 0 | Refills: 0 | DISCHARGE
Start: 2023-01-31 | End: 2023-02-04

## 2023-02-01 ENCOUNTER — EMERGENCY (EMERGENCY)
Facility: HOSPITAL | Age: 68
LOS: 0 days | Discharge: HOME | End: 2023-02-02
Attending: EMERGENCY MEDICINE
Payer: MEDICARE

## 2023-02-01 VITALS
SYSTOLIC BLOOD PRESSURE: 151 MMHG | HEIGHT: 65 IN | HEART RATE: 69 BPM | DIASTOLIC BLOOD PRESSURE: 75 MMHG | TEMPERATURE: 98 F | WEIGHT: 235.01 LBS | OXYGEN SATURATION: 94 % | RESPIRATION RATE: 16 BRPM

## 2023-02-01 DIAGNOSIS — Z99.2 DEPENDENCE ON RENAL DIALYSIS: ICD-10-CM

## 2023-02-01 DIAGNOSIS — I13.2 HYPERTENSIVE HEART AND CHRONIC KIDNEY DISEASE WITH HEART FAILURE AND WITH STAGE 5 CHRONIC KIDNEY DISEASE, OR END STAGE RENAL DISEASE: ICD-10-CM

## 2023-02-01 DIAGNOSIS — Z79.82 LONG TERM (CURRENT) USE OF ASPIRIN: ICD-10-CM

## 2023-02-01 DIAGNOSIS — E11.22 TYPE 2 DIABETES MELLITUS WITH DIABETIC CHRONIC KIDNEY DISEASE: ICD-10-CM

## 2023-02-01 DIAGNOSIS — E78.5 HYPERLIPIDEMIA, UNSPECIFIED: ICD-10-CM

## 2023-02-01 DIAGNOSIS — I50.9 HEART FAILURE, UNSPECIFIED: ICD-10-CM
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
## 2023-02-01 DIAGNOSIS — J44.9 CHRONIC OBSTRUCTIVE PULMONARY DISEASE, UNSPECIFIED: ICD-10-CM
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
## 2023-02-01 DIAGNOSIS — N18.6 END STAGE RENAL DISEASE: ICD-10-CM

## 2023-02-01 DIAGNOSIS — Z91.018 ALLERGY TO OTHER FOODS: ICD-10-CM

## 2023-02-01 DIAGNOSIS — Z99.81 DEPENDENCE ON SUPPLEMENTAL OXYGEN: ICD-10-CM

## 2023-02-01 DIAGNOSIS — Z79.4 LONG TERM (CURRENT) USE OF INSULIN: ICD-10-CM

## 2023-02-01 DIAGNOSIS — M25.552 PAIN IN LEFT HIP: ICD-10-CM

## 2023-02-01 DIAGNOSIS — K59.00 CONSTIPATION, UNSPECIFIED: ICD-10-CM

## 2023-02-01 DIAGNOSIS — M79.18 MYALGIA, OTHER SITE: ICD-10-CM

## 2023-02-01 LAB
ALBUMIN SERPL ELPH-MCNC: 3.1 G/DL — LOW (ref 3.3–5)
ALP SERPL-CCNC: 80 U/L — SIGNIFICANT CHANGE UP (ref 40–120)
ALT FLD-CCNC: 17 U/L — SIGNIFICANT CHANGE UP (ref 12–78)
ANION GAP SERPL CALC-SCNC: 5 MMOL/L — SIGNIFICANT CHANGE UP (ref 5–17)
APTT BLD: 34.6 SEC — SIGNIFICANT CHANGE UP (ref 27.5–35.5)
AST SERPL-CCNC: 24 U/L — SIGNIFICANT CHANGE UP (ref 15–37)
BASOPHILS # BLD AUTO: 0.02 K/UL — SIGNIFICANT CHANGE UP (ref 0–0.2)
BASOPHILS NFR BLD AUTO: 0.2 % — SIGNIFICANT CHANGE UP (ref 0–2)
BILIRUB SERPL-MCNC: 0.4 MG/DL — SIGNIFICANT CHANGE UP (ref 0.2–1.2)
BLD GP AB SCN SERPL QL: SIGNIFICANT CHANGE UP
BUN SERPL-MCNC: 49 MG/DL — HIGH (ref 7–23)
CALCIUM SERPL-MCNC: 9.3 MG/DL — SIGNIFICANT CHANGE UP (ref 8.5–10.1)
CHLORIDE SERPL-SCNC: 103 MMOL/L — SIGNIFICANT CHANGE UP (ref 96–108)
CO2 SERPL-SCNC: 26 MMOL/L — SIGNIFICANT CHANGE UP (ref 22–31)
CREAT SERPL-MCNC: 4.28 MG/DL — HIGH (ref 0.5–1.3)
EGFR: 11 ML/MIN/1.73M2 — LOW
EOSINOPHIL # BLD AUTO: 0.22 K/UL — SIGNIFICANT CHANGE UP (ref 0–0.5)
EOSINOPHIL NFR BLD AUTO: 2.4 % — SIGNIFICANT CHANGE UP (ref 0–6)
GLUCOSE SERPL-MCNC: 165 MG/DL — HIGH (ref 70–99)
HCT VFR BLD CALC: 31.7 % — LOW (ref 34.5–45)
HGB BLD-MCNC: 9.9 G/DL — LOW (ref 11.5–15.5)
IMM GRANULOCYTES NFR BLD AUTO: 0.4 % — SIGNIFICANT CHANGE UP (ref 0–0.9)
INR BLD: 1.02 RATIO — SIGNIFICANT CHANGE UP (ref 0.88–1.16)
LIDOCAIN IGE QN: 26 U/L — LOW (ref 73–393)
LYMPHOCYTES # BLD AUTO: 1.68 K/UL — SIGNIFICANT CHANGE UP (ref 1–3.3)
LYMPHOCYTES # BLD AUTO: 18.1 % — SIGNIFICANT CHANGE UP (ref 13–44)
MCHC RBC-ENTMCNC: 30.8 PG — SIGNIFICANT CHANGE UP (ref 27–34)
MCHC RBC-ENTMCNC: 31.2 GM/DL — LOW (ref 32–36)
MCV RBC AUTO: 98.8 FL — SIGNIFICANT CHANGE UP (ref 80–100)
MONOCYTES # BLD AUTO: 0.78 K/UL — SIGNIFICANT CHANGE UP (ref 0–0.9)
MONOCYTES NFR BLD AUTO: 8.4 % — SIGNIFICANT CHANGE UP (ref 2–14)
NEUTROPHILS # BLD AUTO: 6.52 K/UL — SIGNIFICANT CHANGE UP (ref 1.8–7.4)
NEUTROPHILS NFR BLD AUTO: 70.5 % — SIGNIFICANT CHANGE UP (ref 43–77)
PLATELET # BLD AUTO: 124 K/UL — LOW (ref 150–400)
POTASSIUM SERPL-MCNC: 5.3 MMOL/L — SIGNIFICANT CHANGE UP (ref 3.5–5.3)
POTASSIUM SERPL-SCNC: 5.3 MMOL/L — SIGNIFICANT CHANGE UP (ref 3.5–5.3)
PROT SERPL-MCNC: 7.5 GM/DL — SIGNIFICANT CHANGE UP (ref 6–8.3)
PROTHROM AB SERPL-ACNC: 11.8 SEC — SIGNIFICANT CHANGE UP (ref 10.5–13.4)
RBC # BLD: 3.21 M/UL — LOW (ref 3.8–5.2)
RBC # FLD: 15.7 % — HIGH (ref 10.3–14.5)
SODIUM SERPL-SCNC: 134 MMOL/L — LOW (ref 135–145)
WBC # BLD: 9.26 K/UL — SIGNIFICANT CHANGE UP (ref 3.8–10.5)
WBC # FLD AUTO: 9.26 K/UL — SIGNIFICANT CHANGE UP (ref 3.8–10.5)

## 2023-02-01 PROCEDURE — 85025 COMPLETE CBC W/AUTO DIFF WBC: CPT

## 2023-02-01 PROCEDURE — 86900 BLOOD TYPING SEROLOGIC ABO: CPT

## 2023-02-01 PROCEDURE — 36415 COLL VENOUS BLD VENIPUNCTURE: CPT

## 2023-02-01 PROCEDURE — 74177 CT ABD & PELVIS W/CONTRAST: CPT | Mod: 26,MG

## 2023-02-01 PROCEDURE — 85610 PROTHROMBIN TIME: CPT

## 2023-02-01 PROCEDURE — G1004: CPT

## 2023-02-01 PROCEDURE — 80053 COMPREHEN METABOLIC PANEL: CPT

## 2023-02-01 PROCEDURE — 99284 EMERGENCY DEPT VISIT MOD MDM: CPT | Mod: 25

## 2023-02-01 PROCEDURE — 99285 EMERGENCY DEPT VISIT HI MDM: CPT

## 2023-02-01 PROCEDURE — 86850 RBC ANTIBODY SCREEN: CPT

## 2023-02-01 PROCEDURE — 74177 CT ABD & PELVIS W/CONTRAST: CPT | Mod: MG

## 2023-02-01 PROCEDURE — 80074 ACUTE HEPATITIS PANEL: CPT

## 2023-02-01 PROCEDURE — 96374 THER/PROPH/DIAG INJ IV PUSH: CPT | Mod: XU

## 2023-02-01 PROCEDURE — 86901 BLOOD TYPING SEROLOGIC RH(D): CPT

## 2023-02-01 PROCEDURE — 85730 THROMBOPLASTIN TIME PARTIAL: CPT

## 2023-02-01 PROCEDURE — 90999 UNLISTED DIALYSIS PROCEDURE: CPT

## 2023-02-01 PROCEDURE — 83690 ASSAY OF LIPASE: CPT

## 2023-02-01 RX ORDER — ONDANSETRON 8 MG/1
4 TABLET, FILM COATED ORAL ONCE
Refills: 0 | Status: COMPLETED | OUTPATIENT
Start: 2023-02-01 | End: 2023-02-01

## 2023-02-01 RX ORDER — SODIUM CHLORIDE 9 MG/ML
3 INJECTION INTRAMUSCULAR; INTRAVENOUS; SUBCUTANEOUS ONCE
Refills: 0 | Status: COMPLETED | OUTPATIENT
Start: 2023-02-01 | End: 2023-02-01

## 2023-02-01 RX ORDER — SODIUM CHLORIDE 9 MG/ML
500 INJECTION INTRAMUSCULAR; INTRAVENOUS; SUBCUTANEOUS ONCE
Refills: 0 | Status: COMPLETED | OUTPATIENT
Start: 2023-02-01 | End: 2023-02-01

## 2023-02-01 RX ORDER — ROSUVASTATIN CALCIUM 5 MG/1
1 TABLET ORAL
Qty: 0 | Refills: 0 | DISCHARGE

## 2023-02-01 RX ORDER — INSULIN GLARGINE 100 [IU]/ML
7 INJECTION, SOLUTION SUBCUTANEOUS
Qty: 0 | Refills: 0 | DISCHARGE

## 2023-02-01 RX ORDER — ASPIRIN/CALCIUM CARB/MAGNESIUM 324 MG
1 TABLET ORAL
Qty: 0 | Refills: 0 | DISCHARGE

## 2023-02-01 RX ORDER — LACTULOSE 10 G/15ML
20 SOLUTION ORAL ONCE
Refills: 0 | Status: COMPLETED | OUTPATIENT
Start: 2023-02-01 | End: 2023-02-01

## 2023-02-01 RX ADMIN — SODIUM CHLORIDE 3 MILLILITER(S): 9 INJECTION INTRAMUSCULAR; INTRAVENOUS; SUBCUTANEOUS at 15:55

## 2023-02-01 RX ADMIN — LACTULOSE 20 GRAM(S): 10 SOLUTION ORAL at 21:24

## 2023-02-01 RX ADMIN — ONDANSETRON 4 MILLIGRAM(S): 8 TABLET, FILM COATED ORAL at 20:26

## 2023-02-01 RX ADMIN — SODIUM CHLORIDE 500 MILLILITER(S): 9 INJECTION INTRAMUSCULAR; INTRAVENOUS; SUBCUTANEOUS at 22:02

## 2023-02-01 RX ADMIN — SODIUM CHLORIDE 500 MILLILITER(S): 9 INJECTION INTRAMUSCULAR; INTRAVENOUS; SUBCUTANEOUS at 22:30

## 2023-02-01 NOTE — ED ADULT NURSE REASSESSMENT NOTE - NS ED NURSE REASSESS COMMENT FT1
Dialysis remains ongoing. Medicated for nausea. Complaining of pain, lactulose offered as ordered for relief of constipation, patient refused at this time. Updated on plan of care. All questions answered.
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
## 2023-02-01 NOTE — ED PROVIDER NOTE - RESPIRATORY, MLM
Breath sounds clear and equal bilaterally. Breath sounds grossly clear and equal bilaterally. Respirations nonlabored.

## 2023-02-01 NOTE — ED PROVIDER NOTE - PROGRESS NOTE DETAILS
WYATT Monzon MD:  Pt unsure who her Renal MD is.  SOLOMON Roberts involved in pt hospitalization 12/2022: calling him to discuss case. Erica Monzon:  Case discussed with Dr. Chambers who does not follow with pt but agreed that dialysis should follow CT IV study. He will notidy HD unit, requests HD to be called after pt returns from CAT scan. Erica Monzon:  Case discussed with Dr. Chambers who does not follow with pt but agreed that dialysis should follow CT IV study. He will notify HD unit, requests HD to be called after pt returns from CAT scan. I Angelica Chowdhury attest that this documentation has been prepared under the direction and in the presence of Dr. Monzon. Erica Monzon:  Pt c/o nausea during dialysis. Dr. Monzon ordering Zofran. C MD Nicolás:  BP after HD completed was low --> + improved after NS bolus 500 ml & has remained stable thereafter.  Pt stable for Dc back to NH, transport called. C MD Nicolás:  Fecal disimpaction done to within digital reach: much brown stool removed, no blood, no complication.

## 2023-02-01 NOTE — ED PROVIDER NOTE - MUSCULOSKELETAL, MLM
Spine appears normal, range of motion is not limited, no muscle or joint tenderness Spine appears normal. +tenderness inferior lumbar vertebrae without step off deformity nor swelling. +tenderness posterior left pelvis/hip. Right leg nontender. Right SLR 10 degrees at best. Pt unable to left SLR. Pt can bend left knee. Mild tenderness left hip diffusely. No obvious swelling. Decreased sensation b/l distal LE, left worse than right. Spine appears normal. +tenderness inferior lumbar vertebrae without step off deformity nor swelling. +tenderness posterior left pelvis/hip w/o assocaited soft tissue swelling nor discoloration. Right leg nontender. Right SLR 10 degrees at best. Pt unable to left SLR. Pt can bend left knee. Mild tenderness left hip diffusely. No obvious swelling. Decreased sensation b/l distal LE, left worse than right.
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
## 2023-02-01 NOTE — ED ADULT NURSE REASSESSMENT NOTE - NS ED NURSE REASSESS COMMENT FT1
Dialysis complete. Patient noted to be hypotensive. MD Monzon made aware and at bedside. Patient repositioned for comfort. 500cc Bolus given as ordered.
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
## 2023-02-01 NOTE — PHARMACOTHERAPY INTERVENTION NOTE - COMMENTS
Medication reconciliation completed.  Reviewed Medication list and confirmed med allergies with list from Care Facility [facility]"; confirmed with Dr. First Medrenee.

## 2023-02-01 NOTE — ED ADULT NURSE NOTE - NSIMPLEMENTINTERV_GEN_ALL_ED
Implemented All Fall Risk Interventions:  [place] to call system. Call bell, personal items and telephone within reach. Instruct patient to call for assistance. Room bathroom lighting operational. Non-slip footwear when patient is off stretcher. Physically safe environment: no spills, clutter or unnecessary equipment. Stretcher in lowest position, wheels locked, appropriate side rails in place. Provide visual cue, wrist band, yellow gown, etc. Monitor gait and stability. Monitor for mental status changes and reorient to person, place, and time. Review medications for side effects contributing to fall risk. Reinforce activity limits and safety measures with patient and family. Chronic - on radiation treatment - continue morphine & oxycodone for pain control  -f/up heme/onc consult

## 2023-02-01 NOTE — ED PROVIDER NOTE - PATIENT PORTAL LINK FT
You can access the FollowMyHealth Patient Portal offered by [facility] by registering at the following website: [email or web address]. By joining [employer]’s FollowMyHealth portal, you will also be able to view your health information using other applications (apps) compatible with our system.

## 2023-02-01 NOTE — ED PROVIDER NOTE - GASTROINTESTINAL, MLM
Abdomen soft, non-tender, no guarding. Abdomen soft, no guarding. Mild diffuse abd tenderness. Bowel sounds hypoactive normal pitch.

## 2023-02-01 NOTE — ED PROVIDER NOTE - CONSTITUTIONAL, MLM
normal... Well appearing, awake, alert, oriented to person, place, time/situation and in no apparent distress. Older AA female, awake, alert, oriented to person, place, time/situation and in moderate discomfort due to pain.

## 2023-02-01 NOTE — ED PROVIDER NOTE - NSFOLLOWUPINSTRUCTIONS_ED_ALL_ED_FT
Take oral laxative for constipation.  Continue current medications as per routine.  Continue with regular dialysis schedule.  CT negative for retroperitoneal hemorr., hip/pelvis trauma, + constipation.  Follow up doctor in nursing home & with own Renal doctor.      Constipation, Adult      Constipation is when a person has fewer than three bowel movements in a week, has difficulty having a bowel movement, or has stools (feces) that are dry, hard, or larger than normal. Constipation may be caused by an underlying condition. It may become worse with age if a person takes certain medicines and does not take in enough fluids.      Follow these instructions at home:      Eating and drinking      •Eat foods that have a lot of fiber, such as beans, whole grains, and fresh fruits and vegetables.      •Limit foods that are low in fiber and high in fat and processed sugars, such as fried or sweet foods. These include french fries, hamburgers, cookies, candies, and soda.      •Drink enough fluid to keep your urine pale yellow.      General instructions     •Exercise regularly or as told by your health care provider. Try to do 150 minutes of moderate exercise each week.      •Use the bathroom when you have the urge to go. Do not hold it in.      •Take over-the-counter and prescription medicines only as told by your health care provider. This includes any fiber supplements.    •During bowel movements:   •Practice deep breathing while relaxing the lower abdomen.      •Practice pelvic floor relaxation.        •Watch your condition for any changes. Let your health care provider know about them.      •Keep all follow-up visits as told by your health care provider. This is important.        Contact a health care provider if:    •You have pain that gets worse.      •You have a fever.      •You do not have a bowel movement after 4 days.      •You vomit.      •You are not hungry or you lose weight.      •You are bleeding from the opening between the buttocks (anus).      •You have thin, pencil-like stools.        Get help right away if:    •You have a fever and your symptoms suddenly get worse.      •You leak stool or have blood in your stool.      •Your abdomen is bloated.      •You have severe pain in your abdomen.      •You feel dizzy or you faint.        Summary    •Constipation is when a person has fewer than three bowel movements in a week, has difficulty having a bowel movement, or has stools (feces) that are dry, hard, or larger than normal.      •Eat foods that have a lot of fiber, such as beans, whole grains, and fresh fruits and vegetables.      •Drink enough fluid to keep your urine pale yellow.      •Take over-the-counter and prescription medicines only as told by your health care provider. This includes any fiber supplements.      This information is not intended to replace advice given to you by your health care provider. Make sure you discuss any questions you have with your health care provider. Take oral laxative for constipation.  Rectal manual disimpaction done in ED.  Continue current medications as per routine.  Continue with regular dialysis schedule.  CT negative for retroperitoneal hemorr., hip/pelvis trauma, + constipation.  Follow up doctor in nursing home & with own Renal doctor.      Constipation, Adult      Constipation is when a person has fewer than three bowel movements in a week, has difficulty having a bowel movement, or has stools (feces) that are dry, hard, or larger than normal. Constipation may be caused by an underlying condition. It may become worse with age if a person takes certain medicines and does not take in enough fluids.      Follow these instructions at home:      Eating and drinking      •Eat foods that have a lot of fiber, such as beans, whole grains, and fresh fruits and vegetables.      •Limit foods that are low in fiber and high in fat and processed sugars, such as fried or sweet foods. These include french fries, hamburgers, cookies, candies, and soda.      •Drink enough fluid to keep your urine pale yellow.      General instructions     •Exercise regularly or as told by your health care provider. Try to do 150 minutes of moderate exercise each week.      •Use the bathroom when you have the urge to go. Do not hold it in.      •Take over-the-counter and prescription medicines only as told by your health care provider. This includes any fiber supplements.    •During bowel movements:   •Practice deep breathing while relaxing the lower abdomen.      •Practice pelvic floor relaxation.        •Watch your condition for any changes. Let your health care provider know about them.      •Keep all follow-up visits as told by your health care provider. This is important.        Contact a health care provider if:    •You have pain that gets worse.      •You have a fever.      •You do not have a bowel movement after 4 days.      •You vomit.      •You are not hungry or you lose weight.      •You are bleeding from the opening between the buttocks (anus).      •You have thin, pencil-like stools.        Get help right away if:    •You have a fever and your symptoms suddenly get worse.      •You leak stool or have blood in your stool.      •Your abdomen is bloated.      •You have severe pain in your abdomen.      •You feel dizzy or you faint.        Summary    •Constipation is when a person has fewer than three bowel movements in a week, has difficulty having a bowel movement, or has stools (feces) that are dry, hard, or larger than normal.      •Eat foods that have a lot of fiber, such as beans, whole grains, and fresh fruits and vegetables.      •Drink enough fluid to keep your urine pale yellow.      •Take over-the-counter and prescription medicines only as told by your health care provider. This includes any fiber supplements.      This information is not intended to replace advice given to you by your health care provider. Make sure you discuss any questions you have with your health care provider.

## 2023-02-01 NOTE — ED PROVIDER NOTE - ENMT, MLM
Airway patent, Nasal mucosa clear. Mouth with normal mucosa. Throat has no vesicles, no oropharyngeal exudates and uvula is midline. Airway patent, Nasal mucosa clear. Mouth with mildly dry mucosa. Throat has no vesicles, no oropharyngeal exudates and uvula is midline. NC/AT.

## 2023-02-01 NOTE — ED PROVIDER NOTE - CARE PLAN
1 Principal Discharge DX:	Constipation  Secondary Diagnosis:	Posterior pain of left hip  Secondary Diagnosis:	ESRD on hemodialysis   Principal Discharge DX:	Constipation  Secondary Diagnosis:	Posterior pain of left hip  Secondary Diagnosis:	ESRD on hemodialysis  Secondary Diagnosis:	Fecal impaction of rectum

## 2023-02-01 NOTE — ED ADULT NURSE NOTE - CHIEF COMPLAINT QUOTE
Pt presents to ED BIBA from [facility] for back pain x 1 week. Pt fell last week, had an xray to rule out a fracture which was negative. Pt states that the pain has not gotten better with "the medications they're giving me at the nursing home". Pt hx of dialysis, MWF, did not go to dialysis today. Pt denies any new trauma since the fall from 1 week ago.

## 2023-02-01 NOTE — ED ADULT TRIAGE NOTE - CHIEF COMPLAINT QUOTE
Pt presents to ED BIBA from [facility] for back pain x 1 week. Pt fell last week, had an xray to rule out a fracture which was negative. Pt states that the pain has not gotten better with "the medications they're giving me at the nursing home". Pt hx of dialysis, MWF, did not go to dialysis today. Pt denies any new trauma since the fall from 1 week ago.

## 2023-02-02 VITALS
HEART RATE: 77 BPM | SYSTOLIC BLOOD PRESSURE: 126 MMHG | DIASTOLIC BLOOD PRESSURE: 76 MMHG | RESPIRATION RATE: 18 BRPM | OXYGEN SATURATION: 100 %

## 2023-02-02 LAB
HAV IGM SER-ACNC: SIGNIFICANT CHANGE UP
HBV CORE IGM SER-ACNC: SIGNIFICANT CHANGE UP
HBV SURFACE AG SER-ACNC: SIGNIFICANT CHANGE UP
HCV AB S/CO SERPL IA: 0.74 S/CO — SIGNIFICANT CHANGE UP (ref 0–0.99)
HCV AB SERPL-IMP: SIGNIFICANT CHANGE UP

## 2023-02-02 RX ORDER — LACTULOSE 10 G/15ML
30 SOLUTION ORAL
Qty: 240 | Refills: 0
Start: 2023-02-02 | End: 2023-02-05

## 2023-02-02 NOTE — ED ADULT NURSE REASSESSMENT NOTE - NS ED NURSE REASSESS COMMENT FT1
Pt care assumed from previous RNSravani. Pt safety and comfort measures in place at this time. Pt aware of POC to d/c back to rehab.

## 2023-02-02 NOTE — ED ADULT NURSE REASSESSMENT NOTE - NS ED NURSE REASSESS COMMENT FT1
Resting comfortably, VS stabilized as charted, no signs of distress noted. Denies any complaints at this time. MD Contino aware.

## 2023-02-02 NOTE — ED ADULT NURSE REASSESSMENT NOTE - NS ED NURSE REASSESS COMMENT FT1
[facility] will be able to answer door upon patient arrival and receive patient. Transport set up. Awaiting pickup.

## 2023-04-03 ENCOUNTER — INPATIENT (INPATIENT)
Facility: HOSPITAL | Age: 68
LOS: 7 days | Discharge: NURSING, REHAB OR LONG-TERM CARE | DRG: 177 | End: 2023-04-11
Attending: INTERNAL MEDICINE | Admitting: HOSPITALIST
Payer: MEDICARE

## 2023-04-03 VITALS
RESPIRATION RATE: 20 BRPM | TEMPERATURE: 99 F | HEART RATE: 76 BPM | DIASTOLIC BLOOD PRESSURE: 65 MMHG | OXYGEN SATURATION: 100 % | SYSTOLIC BLOOD PRESSURE: 121 MMHG

## 2023-04-03 DIAGNOSIS — J18.9 PNEUMONIA, UNSPECIFIED ORGANISM: ICD-10-CM

## 2023-04-03 DIAGNOSIS — I77.0 ARTERIOVENOUS FISTULA, ACQUIRED: Chronic | ICD-10-CM

## 2023-04-03 DIAGNOSIS — Z90.49 ACQUIRED ABSENCE OF OTHER SPECIFIED PARTS OF DIGESTIVE TRACT: Chronic | ICD-10-CM

## 2023-04-03 DIAGNOSIS — Z95.0 PRESENCE OF CARDIAC PACEMAKER: Chronic | ICD-10-CM

## 2023-04-03 DIAGNOSIS — Z98.890 OTHER SPECIFIED POSTPROCEDURAL STATES: Chronic | ICD-10-CM

## 2023-04-03 LAB
ADD ON TEST-SPECIMEN IN LAB: SIGNIFICANT CHANGE UP
ALBUMIN SERPL ELPH-MCNC: 2.7 G/DL — LOW (ref 3.3–5)
ALP SERPL-CCNC: 69 U/L — SIGNIFICANT CHANGE UP (ref 40–120)
ALT FLD-CCNC: 12 U/L — SIGNIFICANT CHANGE UP (ref 12–78)
AMMONIA BLD-MCNC: 60 UMOL/L — HIGH (ref 11–32)
ANION GAP SERPL CALC-SCNC: 4 MMOL/L — LOW (ref 5–17)
APTT BLD: 30.4 SEC — SIGNIFICANT CHANGE UP (ref 27.5–35.5)
AST SERPL-CCNC: 16 U/L — SIGNIFICANT CHANGE UP (ref 15–37)
BASE EXCESS BLDA CALC-SCNC: -0.7 MMOL/L — SIGNIFICANT CHANGE UP (ref -2–3)
BASOPHILS # BLD AUTO: 0.02 K/UL — SIGNIFICANT CHANGE UP (ref 0–0.2)
BASOPHILS NFR BLD AUTO: 0.2 % — SIGNIFICANT CHANGE UP (ref 0–2)
BILIRUB SERPL-MCNC: 0.4 MG/DL — SIGNIFICANT CHANGE UP (ref 0.2–1.2)
BLOOD GAS COMMENTS ARTERIAL: SIGNIFICANT CHANGE UP
BUN SERPL-MCNC: 77 MG/DL — HIGH (ref 7–23)
CALCIUM SERPL-MCNC: 8.3 MG/DL — LOW (ref 8.5–10.1)
CHLORIDE SERPL-SCNC: 105 MMOL/L — SIGNIFICANT CHANGE UP (ref 96–108)
CO2 BLDA-SCNC: 28 MMOL/L — HIGH (ref 19–24)
CO2 SERPL-SCNC: 26 MMOL/L — SIGNIFICANT CHANGE UP (ref 22–31)
CREAT SERPL-MCNC: 5.91 MG/DL — HIGH (ref 0.5–1.3)
EGFR: 7 ML/MIN/1.73M2 — LOW
EOSINOPHIL # BLD AUTO: 0.36 K/UL — SIGNIFICANT CHANGE UP (ref 0–0.5)
EOSINOPHIL NFR BLD AUTO: 3.6 % — SIGNIFICANT CHANGE UP (ref 0–6)
GAS PNL BLDA: SIGNIFICANT CHANGE UP
GLUCOSE BLDC GLUCOMTR-MCNC: 143 MG/DL — HIGH (ref 70–99)
GLUCOSE BLDC GLUCOMTR-MCNC: 98 MG/DL — SIGNIFICANT CHANGE UP (ref 70–99)
GLUCOSE SERPL-MCNC: 149 MG/DL — HIGH (ref 70–99)
GLUCOSE SERPL-MCNC: 175 MG/DL — HIGH (ref 70–99)
HAV IGM SER-ACNC: SIGNIFICANT CHANGE UP
HBV CORE IGM SER-ACNC: SIGNIFICANT CHANGE UP
HBV SURFACE AG SER-ACNC: SIGNIFICANT CHANGE UP
HCO3 BLDA-SCNC: 27 MMOL/L — SIGNIFICANT CHANGE UP (ref 21–28)
HCT VFR BLD CALC: 26.4 % — LOW (ref 34.5–45)
HCV AB S/CO SERPL IA: 0.56 S/CO — SIGNIFICANT CHANGE UP (ref 0–0.99)
HCV AB SERPL-IMP: SIGNIFICANT CHANGE UP
HGB BLD-MCNC: 7.9 G/DL — LOW (ref 11.5–15.5)
IMM GRANULOCYTES NFR BLD AUTO: 0.4 % — SIGNIFICANT CHANGE UP (ref 0–0.9)
INR BLD: 1.11 RATIO — SIGNIFICANT CHANGE UP (ref 0.88–1.16)
LACTATE SERPL-SCNC: 0.8 MMOL/L — SIGNIFICANT CHANGE UP (ref 0.7–2)
LIDOCAIN IGE QN: 47 U/L — LOW (ref 73–393)
LYMPHOCYTES # BLD AUTO: 1.05 K/UL — SIGNIFICANT CHANGE UP (ref 1–3.3)
LYMPHOCYTES # BLD AUTO: 10.5 % — LOW (ref 13–44)
MACROCYTES BLD QL: SLIGHT — SIGNIFICANT CHANGE UP
MANUAL SMEAR VERIFICATION: SIGNIFICANT CHANGE UP
MCHC RBC-ENTMCNC: 29.9 GM/DL — LOW (ref 32–36)
MCHC RBC-ENTMCNC: 32 PG — SIGNIFICANT CHANGE UP (ref 27–34)
MCV RBC AUTO: 106.9 FL — HIGH (ref 80–100)
MONOCYTES # BLD AUTO: 0.92 K/UL — HIGH (ref 0–0.9)
MONOCYTES NFR BLD AUTO: 9.2 % — SIGNIFICANT CHANGE UP (ref 2–14)
NEUTROPHILS # BLD AUTO: 7.65 K/UL — HIGH (ref 1.8–7.4)
NEUTROPHILS NFR BLD AUTO: 76.1 % — SIGNIFICANT CHANGE UP (ref 43–77)
PCO2 BLDA: 54 MMHG — HIGH (ref 32–45)
PH BLDA: 7.3 — LOW (ref 7.35–7.45)
PLAT MORPH BLD: NORMAL — SIGNIFICANT CHANGE UP
PLATELET # BLD AUTO: 94 K/UL — LOW (ref 150–400)
PO2 BLDA: 74 MMHG — LOW (ref 83–108)
POIKILOCYTOSIS BLD QL AUTO: SLIGHT — SIGNIFICANT CHANGE UP
POTASSIUM SERPL-MCNC: 5.2 MMOL/L — SIGNIFICANT CHANGE UP (ref 3.5–5.3)
POTASSIUM SERPL-SCNC: 5.2 MMOL/L — SIGNIFICANT CHANGE UP (ref 3.5–5.3)
PROT SERPL-MCNC: 6.7 GM/DL — SIGNIFICANT CHANGE UP (ref 6–8.3)
PROTHROM AB SERPL-ACNC: 12.9 SEC — SIGNIFICANT CHANGE UP (ref 10.5–13.4)
RAPID RVP RESULT: SIGNIFICANT CHANGE UP
RBC # BLD: 2.47 M/UL — LOW (ref 3.8–5.2)
RBC # FLD: 16.5 % — HIGH (ref 10.3–14.5)
RBC BLD AUTO: SIGNIFICANT CHANGE UP
SAO2 % BLDA: 98 % — SIGNIFICANT CHANGE UP (ref 94–98)
SARS-COV-2 RNA SPEC QL NAA+PROBE: SIGNIFICANT CHANGE UP
SODIUM SERPL-SCNC: 135 MMOL/L — SIGNIFICANT CHANGE UP (ref 135–145)
WBC # BLD: 10.04 K/UL — SIGNIFICANT CHANGE UP (ref 3.8–10.5)
WBC # FLD AUTO: 10.04 K/UL — SIGNIFICANT CHANGE UP (ref 3.8–10.5)

## 2023-04-03 PROCEDURE — 82962 GLUCOSE BLOOD TEST: CPT

## 2023-04-03 PROCEDURE — 82607 VITAMIN B-12: CPT

## 2023-04-03 PROCEDURE — 80048 BASIC METABOLIC PNL TOTAL CA: CPT

## 2023-04-03 PROCEDURE — 36430 TRANSFUSION BLD/BLD COMPNT: CPT

## 2023-04-03 PROCEDURE — 99285 EMERGENCY DEPT VISIT HI MDM: CPT | Mod: CS

## 2023-04-03 PROCEDURE — 86923 COMPATIBILITY TEST ELECTRIC: CPT

## 2023-04-03 PROCEDURE — 93005 ELECTROCARDIOGRAM TRACING: CPT

## 2023-04-03 PROCEDURE — 80069 RENAL FUNCTION PANEL: CPT

## 2023-04-03 PROCEDURE — 97116 GAIT TRAINING THERAPY: CPT | Mod: GP

## 2023-04-03 PROCEDURE — 93010 ELECTROCARDIOGRAM REPORT: CPT

## 2023-04-03 PROCEDURE — 71045 X-RAY EXAM CHEST 1 VIEW: CPT

## 2023-04-03 PROCEDURE — 71250 CT THORAX DX C-: CPT | Mod: 26,MA

## 2023-04-03 PROCEDURE — 86850 RBC ANTIBODY SCREEN: CPT

## 2023-04-03 PROCEDURE — 97530 THERAPEUTIC ACTIVITIES: CPT | Mod: GP

## 2023-04-03 PROCEDURE — 84145 PROCALCITONIN (PCT): CPT

## 2023-04-03 PROCEDURE — 85025 COMPLETE CBC W/AUTO DIFF WBC: CPT

## 2023-04-03 PROCEDURE — P9016: CPT

## 2023-04-03 PROCEDURE — 94668 MNPJ CHEST WALL SBSQ: CPT

## 2023-04-03 PROCEDURE — 82803 BLOOD GASES ANY COMBINATION: CPT

## 2023-04-03 PROCEDURE — 94660 CPAP INITIATION&MGMT: CPT

## 2023-04-03 PROCEDURE — 86900 BLOOD TYPING SEROLOGIC ABO: CPT

## 2023-04-03 PROCEDURE — 85027 COMPLETE CBC AUTOMATED: CPT

## 2023-04-03 PROCEDURE — 82009 KETONE BODYS QUAL: CPT

## 2023-04-03 PROCEDURE — 99223 1ST HOSP IP/OBS HIGH 75: CPT | Mod: 25

## 2023-04-03 PROCEDURE — 86706 HEP B SURFACE ANTIBODY: CPT

## 2023-04-03 PROCEDURE — 82140 ASSAY OF AMMONIA: CPT

## 2023-04-03 PROCEDURE — 70450 CT HEAD/BRAIN W/O DYE: CPT | Mod: 26

## 2023-04-03 PROCEDURE — 70450 CT HEAD/BRAIN W/O DYE: CPT

## 2023-04-03 PROCEDURE — 90935 HEMODIALYSIS ONE EVALUATION: CPT

## 2023-04-03 PROCEDURE — 83036 HEMOGLOBIN GLYCOSYLATED A1C: CPT

## 2023-04-03 PROCEDURE — 84443 ASSAY THYROID STIM HORMONE: CPT

## 2023-04-03 PROCEDURE — 74176 CT ABD & PELVIS W/O CONTRAST: CPT

## 2023-04-03 PROCEDURE — 71045 X-RAY EXAM CHEST 1 VIEW: CPT | Mod: 26

## 2023-04-03 PROCEDURE — 84484 ASSAY OF TROPONIN QUANT: CPT

## 2023-04-03 PROCEDURE — 80053 COMPREHEN METABOLIC PANEL: CPT

## 2023-04-03 PROCEDURE — 36415 COLL VENOUS BLD VENIPUNCTURE: CPT

## 2023-04-03 PROCEDURE — 74176 CT ABD & PELVIS W/O CONTRAST: CPT | Mod: 26

## 2023-04-03 PROCEDURE — 94640 AIRWAY INHALATION TREATMENT: CPT

## 2023-04-03 PROCEDURE — 82947 ASSAY GLUCOSE BLOOD QUANT: CPT

## 2023-04-03 PROCEDURE — 83605 ASSAY OF LACTIC ACID: CPT

## 2023-04-03 PROCEDURE — 99497 ADVNCD CARE PLAN 30 MIN: CPT | Mod: 25

## 2023-04-03 PROCEDURE — 97162 PT EVAL MOD COMPLEX 30 MIN: CPT | Mod: GP

## 2023-04-03 PROCEDURE — 36600 WITHDRAWAL OF ARTERIAL BLOOD: CPT

## 2023-04-03 PROCEDURE — 86901 BLOOD TYPING SEROLOGIC RH(D): CPT

## 2023-04-03 PROCEDURE — 82746 ASSAY OF FOLIC ACID SERUM: CPT

## 2023-04-03 RX ORDER — ATORVASTATIN CALCIUM 80 MG/1
80 TABLET, FILM COATED ORAL AT BEDTIME
Refills: 0 | Status: DISCONTINUED | OUTPATIENT
Start: 2023-04-03 | End: 2023-04-11

## 2023-04-03 RX ORDER — LEVOTHYROXINE SODIUM 125 MCG
75 TABLET ORAL DAILY
Refills: 0 | Status: DISCONTINUED | OUTPATIENT
Start: 2023-04-03 | End: 2023-04-11

## 2023-04-03 RX ORDER — CEFEPIME 1 G/1
1000 INJECTION, POWDER, FOR SOLUTION INTRAMUSCULAR; INTRAVENOUS
Refills: 0 | Status: DISCONTINUED | OUTPATIENT
Start: 2023-04-03 | End: 2023-04-04

## 2023-04-03 RX ORDER — PIPERACILLIN AND TAZOBACTAM 4; .5 G/20ML; G/20ML
3.38 INJECTION, POWDER, LYOPHILIZED, FOR SOLUTION INTRAVENOUS ONCE
Refills: 0 | Status: COMPLETED | OUTPATIENT
Start: 2023-04-03 | End: 2023-04-03

## 2023-04-03 RX ORDER — DOCUSATE SODIUM 100 MG
2 CAPSULE ORAL
Qty: 0 | Refills: 0 | DISCHARGE

## 2023-04-03 RX ORDER — DEXTROSE 50 % IN WATER 50 %
15 SYRINGE (ML) INTRAVENOUS ONCE
Refills: 0 | Status: DISCONTINUED | OUTPATIENT
Start: 2023-04-03 | End: 2023-04-07

## 2023-04-03 RX ORDER — LANOLIN ALCOHOL/MO/W.PET/CERES
3 CREAM (GRAM) TOPICAL AT BEDTIME
Refills: 0 | Status: DISCONTINUED | OUTPATIENT
Start: 2023-04-03 | End: 2023-04-11

## 2023-04-03 RX ORDER — ASPIRIN/CALCIUM CARB/MAGNESIUM 324 MG
81 TABLET ORAL DAILY
Refills: 0 | Status: DISCONTINUED | OUTPATIENT
Start: 2023-04-03 | End: 2023-04-11

## 2023-04-03 RX ORDER — IPRATROPIUM/ALBUTEROL SULFATE 18-103MCG
3 AEROSOL WITH ADAPTER (GRAM) INHALATION EVERY 4 HOURS
Refills: 0 | Status: DISCONTINUED | OUTPATIENT
Start: 2023-04-03 | End: 2023-04-11

## 2023-04-03 RX ORDER — BUDESONIDE AND FORMOTEROL FUMARATE DIHYDRATE 160; 4.5 UG/1; UG/1
2 AEROSOL RESPIRATORY (INHALATION)
Qty: 0 | Refills: 0 | DISCHARGE

## 2023-04-03 RX ORDER — DEXTROSE 50 % IN WATER 50 %
25 SYRINGE (ML) INTRAVENOUS ONCE
Refills: 0 | Status: DISCONTINUED | OUTPATIENT
Start: 2023-04-03 | End: 2023-04-07

## 2023-04-03 RX ORDER — INSULIN LISPRO 100/ML
VIAL (ML) SUBCUTANEOUS
Refills: 0 | Status: DISCONTINUED | OUTPATIENT
Start: 2023-04-03 | End: 2023-04-07

## 2023-04-03 RX ORDER — MIDODRINE HYDROCHLORIDE 2.5 MG/1
1 TABLET ORAL
Refills: 0 | DISCHARGE

## 2023-04-03 RX ORDER — ACETAMINOPHEN 500 MG
1 TABLET ORAL
Qty: 0 | Refills: 0 | DISCHARGE

## 2023-04-03 RX ORDER — INSULIN LISPRO 100/ML
0 VIAL (ML) SUBCUTANEOUS
Qty: 0 | Refills: 0 | DISCHARGE

## 2023-04-03 RX ORDER — INSULIN GLARGINE 100 [IU]/ML
7 INJECTION, SOLUTION SUBCUTANEOUS
Qty: 0 | Refills: 0 | DISCHARGE

## 2023-04-03 RX ORDER — GLUCAGON INJECTION, SOLUTION 0.5 MG/.1ML
1 INJECTION, SOLUTION SUBCUTANEOUS ONCE
Refills: 0 | Status: DISCONTINUED | OUTPATIENT
Start: 2023-04-03 | End: 2023-04-07

## 2023-04-03 RX ORDER — GUAIFENESIN/PHENYLEPHRINE HCL 100-7.5/5
10 SYRUP ORAL
Refills: 0 | DISCHARGE

## 2023-04-03 RX ORDER — FAMOTIDINE 10 MG/ML
1 INJECTION INTRAVENOUS
Qty: 0 | Refills: 0 | DISCHARGE

## 2023-04-03 RX ORDER — BUDESONIDE AND FORMOTEROL FUMARATE DIHYDRATE 160; 4.5 UG/1; UG/1
2 AEROSOL RESPIRATORY (INHALATION)
Refills: 0 | Status: DISCONTINUED | OUTPATIENT
Start: 2023-04-03 | End: 2023-04-11

## 2023-04-03 RX ORDER — SERTRALINE 25 MG/1
1 TABLET, FILM COATED ORAL
Qty: 0 | Refills: 0 | DISCHARGE

## 2023-04-03 RX ORDER — SENNA PLUS 8.6 MG/1
2 TABLET ORAL
Qty: 0 | Refills: 0 | DISCHARGE

## 2023-04-03 RX ORDER — INSULIN LISPRO 100/ML
VIAL (ML) SUBCUTANEOUS AT BEDTIME
Refills: 0 | Status: DISCONTINUED | OUTPATIENT
Start: 2023-04-03 | End: 2023-04-07

## 2023-04-03 RX ORDER — TIOTROPIUM BROMIDE 18 UG/1
2 CAPSULE ORAL; RESPIRATORY (INHALATION) DAILY
Refills: 0 | Status: DISCONTINUED | OUTPATIENT
Start: 2023-04-03 | End: 2023-04-03

## 2023-04-03 RX ORDER — MIDODRINE HYDROCHLORIDE 2.5 MG/1
5 TABLET ORAL THREE TIMES A DAY
Refills: 0 | Status: DISCONTINUED | OUTPATIENT
Start: 2023-04-03 | End: 2023-04-11

## 2023-04-03 RX ORDER — ALBUTEROL 90 UG/1
2 AEROSOL, METERED ORAL
Qty: 0 | Refills: 0 | DISCHARGE

## 2023-04-03 RX ORDER — VANCOMYCIN HCL 1 G
1000 VIAL (EA) INTRAVENOUS ONCE
Refills: 0 | Status: COMPLETED | OUTPATIENT
Start: 2023-04-03 | End: 2023-04-03

## 2023-04-03 RX ORDER — LACTULOSE 10 G/15ML
20 SOLUTION ORAL
Refills: 0 | Status: DISCONTINUED | OUTPATIENT
Start: 2023-04-03 | End: 2023-04-10

## 2023-04-03 RX ORDER — TIOTROPIUM BROMIDE 18 UG/1
2 CAPSULE ORAL; RESPIRATORY (INHALATION)
Qty: 0 | Refills: 0 | DISCHARGE

## 2023-04-03 RX ORDER — SENNA PLUS 8.6 MG/1
2 TABLET ORAL AT BEDTIME
Refills: 0 | Status: DISCONTINUED | OUTPATIENT
Start: 2023-04-03 | End: 2023-04-11

## 2023-04-03 RX ORDER — METHOCARBAMOL 500 MG/1
1 TABLET, FILM COATED ORAL
Qty: 0 | Refills: 0 | DISCHARGE

## 2023-04-03 RX ORDER — ALBUTEROL 90 UG/1
2 AEROSOL, METERED ORAL
Refills: 0 | DISCHARGE

## 2023-04-03 RX ORDER — IPRATROPIUM/ALBUTEROL SULFATE 18-103MCG
3 AEROSOL WITH ADAPTER (GRAM) INHALATION EVERY 4 HOURS
Refills: 0 | Status: DISCONTINUED | OUTPATIENT
Start: 2023-04-03 | End: 2023-04-03

## 2023-04-03 RX ORDER — SODIUM CHLORIDE 9 MG/ML
1000 INJECTION, SOLUTION INTRAVENOUS
Refills: 0 | Status: DISCONTINUED | OUTPATIENT
Start: 2023-04-03 | End: 2023-04-07

## 2023-04-03 RX ORDER — LEVOTHYROXINE SODIUM 125 MCG
1 TABLET ORAL
Qty: 0 | Refills: 0 | DISCHARGE

## 2023-04-03 RX ORDER — CEFEPIME 1 G/1
2000 INJECTION, POWDER, FOR SOLUTION INTRAMUSCULAR; INTRAVENOUS
Refills: 0 | Status: COMPLETED | OUTPATIENT
Start: 2023-04-03 | End: 2023-04-10

## 2023-04-03 RX ORDER — GABAPENTIN 400 MG/1
300 CAPSULE ORAL
Refills: 0 | Status: DISCONTINUED | OUTPATIENT
Start: 2023-04-03 | End: 2023-04-11

## 2023-04-03 RX ORDER — ONDANSETRON 8 MG/1
4 TABLET, FILM COATED ORAL EVERY 8 HOURS
Refills: 0 | Status: DISCONTINUED | OUTPATIENT
Start: 2023-04-03 | End: 2023-04-11

## 2023-04-03 RX ORDER — ROSUVASTATIN CALCIUM 5 MG/1
1 TABLET ORAL
Qty: 0 | Refills: 0 | DISCHARGE

## 2023-04-03 RX ORDER — ASPIRIN/CALCIUM CARB/MAGNESIUM 324 MG
1 TABLET ORAL
Qty: 0 | Refills: 0 | DISCHARGE

## 2023-04-03 RX ORDER — MIDODRINE HYDROCHLORIDE 2.5 MG/1
1 TABLET ORAL
Qty: 0 | Refills: 0 | DISCHARGE

## 2023-04-03 RX ORDER — ALBUTEROL 90 UG/1
2 AEROSOL, METERED ORAL EVERY 4 HOURS
Refills: 0 | Status: DISCONTINUED | OUTPATIENT
Start: 2023-04-03 | End: 2023-04-03

## 2023-04-03 RX ORDER — DEXTROSE 50 % IN WATER 50 %
12.5 SYRINGE (ML) INTRAVENOUS ONCE
Refills: 0 | Status: DISCONTINUED | OUTPATIENT
Start: 2023-04-03 | End: 2023-04-07

## 2023-04-03 RX ORDER — FAMOTIDINE 10 MG/ML
20 INJECTION INTRAVENOUS
Refills: 0 | Status: DISCONTINUED | OUTPATIENT
Start: 2023-04-03 | End: 2023-04-11

## 2023-04-03 RX ORDER — ACETAMINOPHEN 500 MG
650 TABLET ORAL EVERY 6 HOURS
Refills: 0 | Status: DISCONTINUED | OUTPATIENT
Start: 2023-04-03 | End: 2023-04-05

## 2023-04-03 RX ORDER — ALBUTEROL 90 UG/1
2.5 AEROSOL, METERED ORAL EVERY 6 HOURS
Refills: 0 | Status: DISCONTINUED | OUTPATIENT
Start: 2023-04-03 | End: 2023-04-11

## 2023-04-03 RX ORDER — SERTRALINE 25 MG/1
100 TABLET, FILM COATED ORAL DAILY
Refills: 0 | Status: DISCONTINUED | OUTPATIENT
Start: 2023-04-03 | End: 2023-04-11

## 2023-04-03 RX ADMIN — Medication 1 DROP(S): at 22:10

## 2023-04-03 RX ADMIN — ONDANSETRON 4 MILLIGRAM(S): 8 TABLET, FILM COATED ORAL at 22:10

## 2023-04-03 RX ADMIN — BUDESONIDE AND FORMOTEROL FUMARATE DIHYDRATE 2 PUFF(S): 160; 4.5 AEROSOL RESPIRATORY (INHALATION) at 22:04

## 2023-04-03 RX ADMIN — ALBUTEROL 2.5 MILLIGRAM(S): 90 AEROSOL, METERED ORAL at 22:04

## 2023-04-03 RX ADMIN — PIPERACILLIN AND TAZOBACTAM 200 GRAM(S): 4; .5 INJECTION, POWDER, LYOPHILIZED, FOR SOLUTION INTRAVENOUS at 13:10

## 2023-04-03 RX ADMIN — Medication 250 MILLIGRAM(S): at 12:00

## 2023-04-03 NOTE — H&P ADULT - NSICDXPASTSURGICALHX_GEN_ALL_CORE_FT
PAST SURGICAL HISTORY:  AV fistula     History of ankle surgery     Pacemaker     S/P cholecystectomy

## 2023-04-03 NOTE — ED ADULT TRIAGE NOTE - CHIEF COMPLAINT QUOTE
Pt arrives to ED from [facility] rehab with shortness of breath. Pt missed lat two hemodialysis sessions last Wed/Friday. unable to assess pt is a poor historian, pt has dementia

## 2023-04-03 NOTE — ED ADULT NURSE NOTE - CHIEF COMPLAINT QUOTE
Pt arrives to ED from [facility] rehab with shortness of breath. Pt missed lat two hemodialysis sessions last Wed/Friday.

## 2023-04-03 NOTE — H&P ADULT - NSHPSOCIALHISTORY_GEN_ALL_CORE
Has been at [facility] for the last 1 year. Needs increased assistance. Quit smoking 20 years ago. Drank 3 beers a day for many years and quit 15 years ago. Denies drug use.

## 2023-04-03 NOTE — ED PROVIDER NOTE - PROGRESS NOTE DETAILS
feels "better" -- CXR/labs/RVP unremarkable.   SpO2 94% on 2L. Spoke with Dr. Roberts - will arrange HD here  Will also obtain CT chest to r/o occult infection.   If comfortable after HD would consider d/c back to [place] feels "better" -- CXR/labs/RVP unremarkable.   SpO2 94% on 2L. Spoke with Dr. Roberts - will arrange HD here  Will also obtain CT chest to r/o occult infection. CT with new infiltrate. ABX already given --- will admit med/surg

## 2023-04-03 NOTE — H&P ADULT - NSICDXFAMILYHX_GEN_ALL_CORE_FT
FAMILY HISTORY:  Mother  Still living? Unknown  Family history of CKD (chronic kidney disease), Age at diagnosis: Age Unknown  FH: diabetes mellitus, Age at diagnosis: Age Unknown

## 2023-04-03 NOTE — ED ADULT NURSE REASSESSMENT NOTE - NS ED NURSE REASSESS COMMENT FT1
pt transported to CT and then to dialysis around 1330.  report given to 2Sw, pt to be transported to room directly from dialysis.

## 2023-04-03 NOTE — H&P ADULT - ASSESSMENT
68 y/o F presented with weakness     1. Generalized weakness likely multifactorial: RUL consolidation/HCAP, AMADO not on CPAP, likely obesity hypoventilation syndrome, 1 missed session of dialysis/increaesd pleural effusions and fluid overload, macrocytic anemia   - Admit to med/surg   - Does not meet SIRS criteria  - s/p Vancomycin and Zosyn in the ER; c/w Cefepime   - f/u UCx, BCx x 2, ABG, B12, folate, TSH, sputum culture, procalcitonin; adjust abx based on sensitivities  - Trend WBC, tylenol for temperatures PRN   - Monitor off IVF given HD pt with trace effusions   - Pt on 3L of home O2; will continue   - BIPAP QHS and PRN (avoid use if pt vomiting d/t risk of aspiration)   - s/p HD today, c/w M/W/F schedule   - Hb 7.9, monitor H+H closely, occult negative on my exam -> sent a fecal occult as well -> may need Procrit injection, will discuss with nephrology   - Nephrology consult - Dr. Roberts     2. Acute metabolic encephalopathy likely secondary to infectious etiology (HCAP) vs. metabolic vs. neurologic   - Ordered CT head, B12, folate, TSH   - Aspiration precautions   - PT evaluation     3. Vomiting possibly secondary to enteritis vs. GERD vs. uremia?   - Monitor off IVF given HD  - Zofran for nausea PRN   - Home dose of pepcid    - NPO; advance diet as tolerated   - Ordered CT abd/pelvis w/o contrast     3. Thrombocytopenia   - PLT 94, trend     4. Hyperglycemia secondary to Type 2 DM   - Glucose 175, monitor closely   - Ordered HbA1c   - NPO -> advance to renal/diabetic diet   - c/w moderate dose ISS while NPO -> once diet advanced add on basal bolus dosing     5. Hypoalbuminemia   - Albumin 2.7   - [place] consult     6. History of GI bleed, anemia, HTN, Type 2 DM, AMADO, obesity, COPD (on 3 L home O2), acute on chronic respiratory failure, CAD, HLD, pancreatitis, s/p Micra pacemaker, ESRD dialysis (M/W/F), questionable communication deficits at baseline?  - c/w home medications; verified with facility list     DVT ppx: SCDs (given recent admission for GI bleed and anemia, consider advancing to pharmacological ppx if send out fecal occult is negative as well)   Code status: The patient is A+Ox2 at the time of my evaluation and does not have full capacity to make an informed decision or good understanding of the risks associated with the decision being made. I spoke to the patient's health care proxy, Bel Seth, who stated the patient would want to be full code (Pt agrees to chest compressions and intubation if required).   Emergency contact: Bel Seth (daughter [phone]) or Sascha Kendrick (son [phone])    I spent a total of 80 minutes on the date of this encounter coordinating the patient's care. This includes reviewing prior documentation, results and imaging in addition to completing a full history and physical examination on the patient. Further tests, medications, and procedures have been ordered as indicated. Laboratory results and the plan of care were communicated to the patient and/or their family member. Supporting documentation was completed and added to the patient's chart.  66 y/o F presented with weakness     1. Generalized weakness likely multifactorial: RUL consolidation/HCAP, AMADO not on CPAP, likely obesity hypoventilation syndrome, 1 missed session of dialysis/increaesd pleural effusions and fluid overload, macrocytic anemia   - Admit to med/surg   - Does not meet SIRS criteria  - s/p Vancomycin and Zosyn in the ER; c/w Cefepime   - f/u UCx, BCx x 2, ABG, B12, folate, TSH, sputum culture, procalcitonin; adjust abx based on sensitivities  - Trend WBC, tylenol for temperatures PRN   - Monitor off IVF given HD pt with trace effusions   - Pt on 3L of home O2; will continue   - BIPAP QHS and PRN (avoid use if pt vomiting d/t risk of aspiration)   - s/p HD today, c/w M/W/F schedule   - Hb 7.9, monitor H+H closely, occult negative on my exam -> sent a fecal occult as well -> may need Procrit injection, will discuss with nephrology   - Nephrology consult - Dr. Roberts     2. Acute metabolic encephalopathy likely secondary to infectious etiology (HCAP) vs. metabolic vs. neurologic   - Ordered CT head, B12, folate, TSH   - Aspiration precautions   - PT evaluation     3. Vomiting possibly secondary to enteritis vs. GERD vs. uremia?   - Monitor off IVF given HD  - Zofran for nausea PRN   - Home dose of pepcid    - NPO; advance diet as tolerated   - Ordered CT abd/pelvis w/o contrast     3. Thrombocytopenia   - PLT 94, trend     4. Hyperglycemia secondary to Type 2 DM   - Glucose 175, monitor closely   - Ordered HbA1c   - NPO -> advance to renal/diabetic diet   - c/w moderate dose ISS while NPO -> once diet advanced add on basal bolus dosing     5. Hypoalbuminemia   - Albumin 2.7   - Nutrition consult     6. History of GI bleed, anemia, HTN, Type 2 DM, AMADO, obesity, COPD (on 3 L home O2), acute on chronic respiratory failure, CAD, HLD, pancreatitis, s/p Micra pacemaker, ESRD dialysis (M/W/F), questionable communication deficits at baseline?  - c/w home medications; verified with facility list     DVT ppx: SCDs (given recent admission for GI bleed and anemia, consider advancing to pharmacological ppx if send out fecal occult is negative as well)   Code status: The patient is A+Ox2 at the time of my evaluation and does not have full capacity to make an informed decision or good understanding of the risks associated with the decision being made. I spoke to the patient's health care proxy, Bel Seth, who stated the patient would want to be full code (agreeable to chest compressions and intubation if required)   Emergency contact: Bel Seth (daughter [phone]) or Sascha Kendrick (son [phone])    I spent a total of 80 minutes on the date of this encounter coordinating the patient's care. This includes reviewing prior documentation, results and imaging in addition to completing a full history and physical examination on the patient. Further tests, medications, and procedures have been ordered as indicated. Laboratory results and the plan of care were communicated to the patient and/or their family member. Supporting documentation was completed and added to the patient's chart.

## 2023-04-03 NOTE — H&P ADULT - CONVERSATION DETAILS
Code status: The patient is A+Ox2 at the time of my evaluation and does not have full capacity to make an informed decision or good understanding of the risks associated with the decision being made. I spoke to the patient's health care proxy, Bel Seth, who stated the patient would want to be full code (Pt agrees to chest compressions and intubation if required). Code status: The patient is A+Ox2 at the time of my evaluation and does not have full capacity to make an informed decision or good understanding of the risks associated with the decision being made. I spoke to the patient's health care proxy, Bel Seth, who stated the patient would want to be full code (agreeable to chest compressions and intubation if required).

## 2023-04-03 NOTE — H&P ADULT - HISTORY OF PRESENT ILLNESS
66 y/o F with PMH GI bleed, anemia, HTN, Type 2 DM, AMADO, obesity, COPD (on 3 L home O2), acute on chronic respiratory failure, CAD, HLD, pancreatitis, s/p Micra pacemaker, ESRD dialysis (M/W/F), s/p AV fistula placement questionable communication deficits at baseline? presented with weakness. The pt stated that she did not feel well the past few days and refused dialysis Friday and today. Reports feeling weak, tired, cough with mucus production, shortness of breath, vomiting. At the time of my evaluation the pt was unable to provide some of her history. I spoke to her daughter, Bel, over the phone who stated that her mother has not been acting like herself. She seems more confused than usual (normally she is A+Ox3) and more fatigued. This has happened in the past when her Hb has been low. She had a colonoscopy within the last 1 year which was negative. She used to get Procrit injections but has not been getting any recently. Additionally, she has been having chest congestion, shortness of breath, and cramping in her hands. The pt's daughter stated the pt has AMADO but she has not been wearing CPAP at [facility]. The pt had an AV fistula placed 3 weeks ago in the left arm.     Prior admission:  - 12/8/22: lower GI bleed likely diverticular s/p 2 units of PRBCs (Hb 7.1 at that time), treated for HCAP    ER course: VSS. Labs: Hb 7.9 (baseline ~9), .9, PLT 94, Cr 5.91, glucose 175, albumin 2.7, COVID and RVP negative.    EKG: pending, f/u result     Imaging:  - CXR: central venous catheter in SVC, micra pacemaker, RUL PNA  - CT chest: New consolidation is noted in the posterior segment of the right upper lobe. Differential diagnostic consideration includes pneumonia. 0.8 cm solid nodule in RLL unchanged from prior. Trace b/l effusions. Chronic pancreatitis. Increased left lower lobe atelectasis when compared to previous exam.    Pt was given Zoysn, Vancomycin. She is being admitted to med/surg for further management.

## 2023-04-03 NOTE — ED ADULT NURSE NOTE - NS_SISCREENINGSR_GEN_ALL_ED
General Sunscreen Counseling: I recommended a broad spectrum sunscreen with a SPF of 30 or higher to be used daily in sun exposed areas.  If swimming, working or sweating outside, a water resistant sunscreen may be appropriate.  Sunscreens should be applied at least 15 minutes prior to expected sun exposure and then every 2 hours after that as long as sun exposure continues.  I also recommended a lip balm with a sunscreen as well. Sun protective clothing can be used in lieu of sunscreen but must be worn the entire time you are exposed to the sun's rays.
Detail Level: Detailed
Negative

## 2023-04-03 NOTE — ED ADULT NURSE NOTE - OBJECTIVE STATEMENT
pt is 66 yo female bibems from [facility] for SOB, nausea, back pain and "not feeling good." fistula noted over the right arm. 87% RA. +rhonchi b/l.  Dialysis schedule MWF -

## 2023-04-03 NOTE — PHARMACOTHERAPY INTERVENTION NOTE - COMMENTS
Medication history complete, patient is from [facility], reviewed and confirmed medication with list provided by facility.

## 2023-04-03 NOTE — ED ADULT NURSE NOTE - NSIMPLEMENTINTERV_GEN_ALL_ED
Implemented All Fall Risk Interventions:  [place] to call system. Call bell, personal items and telephone within reach. Instruct patient to call for assistance. Room bathroom lighting operational. Non-slip footwear when patient is off stretcher. Physically safe environment: no spills, clutter or unnecessary equipment. Stretcher in lowest position, wheels locked, appropriate side rails in place. Provide visual cue, wrist band, yellow gown, etc. Monitor gait and stability. Monitor for mental status changes and reorient to person, place, and time. Review medications for side effects contributing to fall risk. Reinforce activity limits and safety measures with patient and family.

## 2023-04-03 NOTE — ED PROVIDER NOTE - OBJECTIVE STATEMENT
68 y/o female with a PMHx of asthma with COPD on 2L home O2, CHF, DM, ESRD on dialysis (M/W/F), HTN, HLD presents to the ED from [facility] c/o SOB, decreased PO, cough, nasal congestion x5 days. Per triage note patient missed last two hemodialysis sessions last wed/thurs, but pt states she hasn't missed dialysis. States she's felt unwell ever since dialysis session last wed. On RA 87%. 68 y/o female with a PMHx of asthma with COPD on 2L home O2, CHF, DM, ESRD on dialysis (M/W/F), HTN, HLD presents to the ED from [facility] c/o SOB, decreased PO, cough, nasal congestion x5 days. Per triage note patient missed last two hemodialysis sessions last wed/friday but pt states she hasn't missed dialysis. States she's felt unwell ever since dialysis session last wed. On RA 87%.

## 2023-04-03 NOTE — H&P ADULT - NSICDXPASTMEDICALHX_GEN_ALL_CORE_FT
PAST MEDICAL HISTORY:  Anemia     Asthma with chronic obstructive pulmonary disease (COPD)     CAD (coronary artery disease)     CHF (congestive heart failure)     Diabetes     ESRD on dialysis     HLD (hyperlipidemia)     HTN (hypertension)     Lower GI bleed     Obesity     On home oxygen therapy     AMADO (obstructive sleep apnea)     Pancreatitis

## 2023-04-04 LAB
A1C WITH ESTIMATED AVERAGE GLUCOSE RESULT: 6.2 % — HIGH (ref 4–5.6)
ALBUMIN SERPL ELPH-MCNC: 2.6 G/DL — LOW (ref 3.3–5)
ALP SERPL-CCNC: 65 U/L — SIGNIFICANT CHANGE UP (ref 40–120)
ALT FLD-CCNC: 10 U/L — LOW (ref 12–78)
ANION GAP SERPL CALC-SCNC: 6 MMOL/L — SIGNIFICANT CHANGE UP (ref 5–17)
AST SERPL-CCNC: 9 U/L — LOW (ref 15–37)
BASE EXCESS BLDA CALC-SCNC: -1.3 MMOL/L — SIGNIFICANT CHANGE UP (ref -2–3)
BASE EXCESS BLDA CALC-SCNC: -3.3 MMOL/L — LOW (ref -2–3)
BASOPHILS # BLD AUTO: 0.02 K/UL — SIGNIFICANT CHANGE UP (ref 0–0.2)
BASOPHILS NFR BLD AUTO: 0.2 % — SIGNIFICANT CHANGE UP (ref 0–2)
BILIRUB SERPL-MCNC: 0.5 MG/DL — SIGNIFICANT CHANGE UP (ref 0.2–1.2)
BLOOD GAS COMMENTS ARTERIAL: SIGNIFICANT CHANGE UP
BLOOD GAS COMMENTS ARTERIAL: SIGNIFICANT CHANGE UP
BUN SERPL-MCNC: 31 MG/DL — HIGH (ref 7–23)
CALCIUM SERPL-MCNC: 8.3 MG/DL — LOW (ref 8.5–10.1)
CHLORIDE SERPL-SCNC: 100 MMOL/L — SIGNIFICANT CHANGE UP (ref 96–108)
CO2 BLDA-SCNC: 28 MMOL/L — HIGH (ref 19–24)
CO2 BLDA-SCNC: 31 MMOL/L — HIGH (ref 19–24)
CO2 SERPL-SCNC: 29 MMOL/L — SIGNIFICANT CHANGE UP (ref 22–31)
CREAT SERPL-MCNC: 3.69 MG/DL — HIGH (ref 0.5–1.3)
EGFR: 13 ML/MIN/1.73M2 — LOW
EOSINOPHIL # BLD AUTO: 0.15 K/UL — SIGNIFICANT CHANGE UP (ref 0–0.5)
EOSINOPHIL NFR BLD AUTO: 1.5 % — SIGNIFICANT CHANGE UP (ref 0–6)
ESTIMATED AVERAGE GLUCOSE: 131 MG/DL — HIGH (ref 68–114)
FOLATE SERPL-MCNC: 7.4 NG/ML — SIGNIFICANT CHANGE UP
GAS PNL BLDA: SIGNIFICANT CHANGE UP
GLUCOSE BLDC GLUCOMTR-MCNC: 110 MG/DL — HIGH (ref 70–99)
GLUCOSE BLDC GLUCOMTR-MCNC: 136 MG/DL — HIGH (ref 70–99)
GLUCOSE BLDC GLUCOMTR-MCNC: 160 MG/DL — HIGH (ref 70–99)
GLUCOSE BLDC GLUCOMTR-MCNC: 166 MG/DL — HIGH (ref 70–99)
GLUCOSE SERPL-MCNC: 111 MG/DL — HIGH (ref 70–99)
HCO3 BLDA-SCNC: 26 MMOL/L — SIGNIFICANT CHANGE UP (ref 21–28)
HCO3 BLDA-SCNC: 28 MMOL/L — SIGNIFICANT CHANGE UP (ref 21–28)
HCT VFR BLD CALC: 24.4 % — LOW (ref 34.5–45)
HGB BLD-MCNC: 7.1 G/DL — LOW (ref 11.5–15.5)
IMM GRANULOCYTES NFR BLD AUTO: 0.3 % — SIGNIFICANT CHANGE UP (ref 0–0.9)
LYMPHOCYTES # BLD AUTO: 0.92 K/UL — LOW (ref 1–3.3)
LYMPHOCYTES # BLD AUTO: 9 % — LOW (ref 13–44)
MCHC RBC-ENTMCNC: 29.1 GM/DL — LOW (ref 32–36)
MCHC RBC-ENTMCNC: 31.1 PG — SIGNIFICANT CHANGE UP (ref 27–34)
MCV RBC AUTO: 107 FL — HIGH (ref 80–100)
MONOCYTES # BLD AUTO: 1.17 K/UL — HIGH (ref 0–0.9)
MONOCYTES NFR BLD AUTO: 11.5 % — SIGNIFICANT CHANGE UP (ref 2–14)
NEUTROPHILS # BLD AUTO: 7.91 K/UL — HIGH (ref 1.8–7.4)
NEUTROPHILS NFR BLD AUTO: 77.5 % — HIGH (ref 43–77)
PCO2 BLDA: 67 MMHG — HIGH (ref 32–45)
PCO2 BLDA: 71 MMHG — CRITICAL HIGH (ref 32–45)
PH BLDA: 7.2 — CRITICAL LOW (ref 7.35–7.45)
PH BLDA: 7.21 — LOW (ref 7.35–7.45)
PLATELET # BLD AUTO: 104 K/UL — LOW (ref 150–400)
PO2 BLDA: 84 MMHG — SIGNIFICANT CHANGE UP (ref 83–108)
PO2 BLDA: 84 MMHG — SIGNIFICANT CHANGE UP (ref 83–108)
POTASSIUM SERPL-MCNC: 4.2 MMOL/L — SIGNIFICANT CHANGE UP (ref 3.5–5.3)
POTASSIUM SERPL-SCNC: 4.2 MMOL/L — SIGNIFICANT CHANGE UP (ref 3.5–5.3)
PROCALCITONIN SERPL-MCNC: 0.87 NG/ML — HIGH (ref 0.02–0.1)
PROT SERPL-MCNC: 6.4 GM/DL — SIGNIFICANT CHANGE UP (ref 6–8.3)
RBC # BLD: 2.28 M/UL — LOW (ref 3.8–5.2)
RBC # FLD: 16.7 % — HIGH (ref 10.3–14.5)
SAO2 % BLDA: 97 % — SIGNIFICANT CHANGE UP (ref 94–98)
SAO2 % BLDA: 98 % — SIGNIFICANT CHANGE UP (ref 94–98)
SODIUM SERPL-SCNC: 135 MMOL/L — SIGNIFICANT CHANGE UP (ref 135–145)
TSH SERPL-MCNC: 0.44 UU/ML — SIGNIFICANT CHANGE UP (ref 0.34–4.82)
VIT B12 SERPL-MCNC: 986 PG/ML — SIGNIFICANT CHANGE UP (ref 232–1245)
WBC # BLD: 10.2 K/UL — SIGNIFICANT CHANGE UP (ref 3.8–10.5)
WBC # FLD AUTO: 10.2 K/UL — SIGNIFICANT CHANGE UP (ref 3.8–10.5)

## 2023-04-04 PROCEDURE — 71045 X-RAY EXAM CHEST 1 VIEW: CPT | Mod: 26

## 2023-04-04 PROCEDURE — 99233 SBSQ HOSP IP/OBS HIGH 50: CPT

## 2023-04-04 RX ORDER — TIOTROPIUM BROMIDE 18 UG/1
2 CAPSULE ORAL; RESPIRATORY (INHALATION) DAILY
Refills: 0 | Status: DISCONTINUED | OUTPATIENT
Start: 2023-04-04 | End: 2023-04-11

## 2023-04-04 RX ORDER — MAGNESIUM SULFATE 500 MG/ML
2 VIAL (ML) INJECTION ONCE
Refills: 0 | Status: COMPLETED | OUTPATIENT
Start: 2023-04-04 | End: 2023-04-04

## 2023-04-04 RX ORDER — AZITHROMYCIN 500 MG/1
500 TABLET, FILM COATED ORAL DAILY
Refills: 0 | Status: COMPLETED | OUTPATIENT
Start: 2023-04-04 | End: 2023-04-08

## 2023-04-04 RX ORDER — POLYETHYLENE GLYCOL 3350 17 G/17G
17 POWDER, FOR SOLUTION ORAL DAILY
Refills: 0 | Status: DISCONTINUED | OUTPATIENT
Start: 2023-04-04 | End: 2023-04-11

## 2023-04-04 RX ORDER — ACETAMINOPHEN 500 MG
650 TABLET ORAL EVERY 6 HOURS
Refills: 0 | Status: DISCONTINUED | OUTPATIENT
Start: 2023-04-04 | End: 2023-04-04

## 2023-04-04 RX ADMIN — MIDODRINE HYDROCHLORIDE 5 MILLIGRAM(S): 2.5 TABLET ORAL at 06:15

## 2023-04-04 RX ADMIN — ATORVASTATIN CALCIUM 80 MILLIGRAM(S): 80 TABLET, FILM COATED ORAL at 22:00

## 2023-04-04 RX ADMIN — Medication 3 MILLILITER(S): at 16:23

## 2023-04-04 RX ADMIN — BUDESONIDE AND FORMOTEROL FUMARATE DIHYDRATE 2 PUFF(S): 160; 4.5 AEROSOL RESPIRATORY (INHALATION) at 08:18

## 2023-04-04 RX ADMIN — Medication 3 MILLILITER(S): at 12:20

## 2023-04-04 RX ADMIN — Medication 1200 MILLIGRAM(S): at 10:43

## 2023-04-04 RX ADMIN — Medication 1 DROP(S): at 10:44

## 2023-04-04 RX ADMIN — Medication 3 MILLILITER(S): at 20:06

## 2023-04-04 RX ADMIN — MIDODRINE HYDROCHLORIDE 5 MILLIGRAM(S): 2.5 TABLET ORAL at 13:47

## 2023-04-04 RX ADMIN — Medication 1200 MILLIGRAM(S): at 22:00

## 2023-04-04 RX ADMIN — Medication 1 DROP(S): at 22:01

## 2023-04-04 RX ADMIN — Medication 2: at 06:16

## 2023-04-04 RX ADMIN — SERTRALINE 100 MILLIGRAM(S): 25 TABLET, FILM COATED ORAL at 10:43

## 2023-04-04 RX ADMIN — Medication 150 GRAM(S): at 12:17

## 2023-04-04 RX ADMIN — Medication 3 MILLILITER(S): at 08:17

## 2023-04-04 RX ADMIN — SENNA PLUS 2 TABLET(S): 8.6 TABLET ORAL at 22:00

## 2023-04-04 RX ADMIN — Medication 40 MILLIGRAM(S): at 22:01

## 2023-04-04 RX ADMIN — Medication 650 MILLIGRAM(S): at 22:01

## 2023-04-04 RX ADMIN — Medication 75 MICROGRAM(S): at 06:15

## 2023-04-04 RX ADMIN — Medication 3 MILLILITER(S): at 04:05

## 2023-04-04 RX ADMIN — Medication 81 MILLIGRAM(S): at 10:43

## 2023-04-04 RX ADMIN — Medication 125 MILLIGRAM(S): at 12:09

## 2023-04-04 RX ADMIN — AZITHROMYCIN 500 MILLIGRAM(S): 500 TABLET, FILM COATED ORAL at 18:13

## 2023-04-04 RX ADMIN — Medication 3 MILLILITER(S): at 00:12

## 2023-04-04 NOTE — DIETITIAN INITIAL EVALUATION ADULT - CALCULATED FROM (G/KG)
MRN:[id number]                      After Visit Summary   3/17/2021    Anya Mazariegos    MRN: [id number]           Visit Information        Department      3/17/2021  8:05 AM [facility] Interventional Radiology          Review of your medicines      UNREVIEWED medicines. Ask your doctor about these medicines       Dose / Directions   acetaminophen 500 MG tablet  Commonly known as: TYLENOL      Dose: 500 mg  Take 500 mg by mouth  Refills: 0     atorvastatin 40 MG tablet  Commonly known as: LIPITOR      Dose: 40 mg  Take 40 mg by mouth daily  Refills: 0     ibuprofen 200 MG tablet  Commonly known as: ADVIL/MOTRIN      Dose: 400 mg  Take 400 mg by mouth every 6 hours as needed  Refills: 0     Lantus SoloStar 100 UNIT/ML pen  Generic drug: insulin glargine      Dose: 44 Units  Inject 44 Units Subcutaneous every morning  Refills: 0     metFORMIN 500 MG 24 hr tablet  Commonly known as: GLUCOPHAGE-XR      Dose: 1,000 mg  Take 1,000 mg by mouth 2 times daily (with meals)  Refills: 0     morphine 15 MG CR tablet  Commonly known as: MS CONTIN      Dose: 15 mg  Take 15 mg by mouth every 8 hours  Refills: 0     omeprazole 20 MG DR capsule  Commonly known as: priLOSEC      Dose: 20 mg  Take 20 mg by mouth daily  Refills: 0     oxyCODONE 5 MG capsule  Commonly known as: OXY-IR      Dose: 1-2 capsule  Take 1-2 capsules by mouth every 6 hours as needed for severe pain  Refills: 0        CONTINUE these medicines which have NOT CHANGED       Dose / Directions   MM Pen Needles 31G X 8 MM miscellaneous  Generic drug: insulin pen needle      Use twice daily.  Refills: 0     TRUETEST test strip  Generic drug: blood glucose      Test twice daily.  Refills: 0              Protect others around you: Learn how to safely use, store and throw away your medicines at www.disposemymeds.org.       Follow-ups after your visit       Care Instructions       Further instructions from your care team       [facility]    Interventional Radiology  Patient Instructions Following Lung Biopsy    AFTER YOU GO HOME  ? If you were given sedation DO NOT drive or operate machinery at home or at work for at least 24 hours  ? DO relax and take it easy for 48 hours, no strenuous activity for 24 hours  ? DO drink plenty of fluids  ? DO resume your regular diet, unless otherwise instructed by your Primary Physician  ? Keep the dressing dry and in place for 24 hours.  ? DO NOT SMOKE FOR AT LEAST 24 HOURS, if you have been given any medications that were to help you relax or sedate you during your procedure  ? DO NOT drink alcoholic beverages the day of your procedure  ? DO NOT do any strenuous exercise or lifting (> 10 lbs) for at least 3 days following your procedure  ? DO NOT take a bath or shower for at least 12 hours following your procedure  ? Remove dressing after shower the next day. Replace with Band aid for 2 days.  Never leave a wet dressing in place.  ? DO NOT make any important or legal decisions for 24 hours following your procedure  ? There should be minimum drainage from the biopsy site    CALL THE PHYSICIAN IF:  ? You start bleeding from the procedure site.  If you do start to bleed from that site,  hold pressure on the site for a minimum of 10 minutes.  Your physician will tell you if you need to return to the hospital  ? You develop nausea or vomiting  ? You have excessive swelling, redness, or tenderness at the site  ? You have drainage that looks like it is infected.  ? You experience severe pain  ? You develop hives or a rash or unexplained itching  ? You develop shortness of breath  ? You develop a temperature of 101 degrees F or greater  ? You develop chest pain or cough up blood, lightheadedness or fainting    ADDITIONAL INSTRUCTIONS: none    [facility] INTERVENTIONAL RADIOLOGY DEPARTMENT  Procedure Physician:         Dr. Vazquez           Date of procedure: March 17, 2021  Telephone  Numbers: [phone] Monday-Friday 8:00 am to 4:30 pm  [phone] After 4:30 pm Monday-Friday, Weekends & Holidays.   Ask for the Interventional Radiologist on call.  Someone is on call 24 hrs/day  [facility] toll free number: [phone] Monday-Friday 8:00 am to 4:30 pm  [facility] Emergency Dept: [phone]          Information about OPIOIDS    PRESCRIPTION OPIOIDS: WHAT YOU NEED TO KNOW   We gave you an opioid (narcotic) pain medicine. It is important to manage your pain, but opioids are not always the best choice. You should first try all the other options your care team gave you. Take this medicine for as short a time (and as few doses) as possible.    Some activities can increase your pain, such as bandage changes or therapy sessions. It may help to take your pain medicine 30 to 60 minutes before these activities. Reduce your stress by getting enough sleep, working on hobbies you enjoy and practicing relaxation or meditation. Talk to your care team about ways to manage your pain beyond prescription opioids.    These medicines have risks:    DO NOT drive when on new or higher doses of pain medicine. These medicines can affect your alertness and reaction times, and you could be arrested for driving under the influence (DUI). If you need to use opioids long-term, talk to your care team about driving.    DO NOT operate heavy machinery    DO NOT do any other dangerous activities while taking these medicines.    DO NOT drink any alcohol while taking these medicines.     If the opioid prescribed includes acetaminophen, DO NOT take with any other medicines that contain acetaminophen. Read all labels carefully. Look for the word  acetaminophen  or  Tylenol.  Ask your pharmacist if you have questions or are unsure.    You can get addicted to pain medicines, especially if you have a history of addiction (chemical, alcohol or substance dependence). Talk to your care team about ways to reduce this risk.    All opioids tend to  cause constipation. Drink plenty of water and eat foods that have a lot of fiber, such as fruits, vegetables, prune juice, apple juice and high-fiber cereal. Take a laxative (Miralax, milk of magnesia, Colace, Senna) if you don t move your bowels at least every other day. Other side effects include upset stomach, sleepiness, dizziness, throwing up, tolerance (needing more of the medicine to have the same effect), physical dependence and slowed breathing.    Store your pills in a secure place, locked if possible. We will not replace any lost or stolen medicine. If you don t finish your medicine, please throw away (dispose) as directed by your pharmacist. Medication waste collection kiosks are conveniently located at all [facility] retail pharmacy locations.  The [place] Pollution Control Agency has more information about safe disposal: https://www.pca.[facility].[place].us/living-green/managing-unwanted-medications       Additional Information About Your Visit       [employer] Information    [employer] gives you secure access to your electronic health record. If you see a primary care provider, you can also send messages to your care team and make appointments. If you have questions, please call your primary care clinic.  If you do not have a primary care provider, please call [phone] and they will assist you.       Care EveryWhere ID    This is your Care EveryWhere ID. This could be used by other organizations to access your [facility] medical records  [id number]       Your Vitals Were  Most recent update: 3/17/2021 11:01 AM    Blood Pressure   105/66    Pulse   81    Temperature   98.2  F (36.8  C) (Oral)    Respirations   16    Pulse Oximetry   99%          Primary Care Provider Fax #    Physician No Ref-Primary [phone]      Equal Access to Services    MORIS FISHER : ortiz Aguero qaybta kaalmada adeegyada, waxay idiin hayaan adeeg kharash la'aan ah. So [facility]  [phone].    ATENCIÓN: Si mitesh thomas, tiene a guerrero disposición servicios gratuitos de asistencia lingüística. Linda hargrove [phone].    We comply with applicable federal and state civil rights laws, including the [place] Human Rights Act. We do not discriminate on the basis of race, color, creed, Anabaptist, national origin, marital status, age, disability, sex, sexual orientation, or gender identity.    If you would like an itemization of your charges they will now be available in [employer] 30 days after discharge. To access the itemized statements in [employer] go to billing/billing summary. From there select view account. There will be multiple tabs showing an overview of your account, detail, payments, and communications. From the communications tab you can see your monthly statements, your itemized statements, and any billing letters generated for your account. If you do not have a [employer] account and need help getting access please contact [employer] support at [phone].  If you would prefer to have your itemized statements mailed please contact our automated itemized bill request line at [phone] option  2.       Thank you!    Thank you for choosing [facility] for your care. Our goal is always to provide you with excellent care. Hearing back from our patients is one way we can continue to improve our services. Please take a few minutes to complete the written survey that you may receive in the mail after you visit with us. Thank you!            Medication List      Medications          Morning Afternoon Evening Bedtime As Needed    MM Pen Needles 31G X 8 MM miscellaneous  INSTRUCTIONS: Use twice daily.  Generic drug: insulin pen needle                     TRUETEST test strip  INSTRUCTIONS: Test twice daily.  Generic drug: blood glucose                       ASK your doctor about these medications          Morning Afternoon Evening Bedtime As Needed    acetaminophen 500 MG tablet  Also known as:  TYLENOL  INSTRUCTIONS: Take 500 mg by mouth                     atorvastatin 40 MG tablet  Also known as: LIPITOR  INSTRUCTIONS: Take 40 mg by mouth daily                     ibuprofen 200 MG tablet  Also known as: ADVIL/MOTRIN  INSTRUCTIONS: Take 400 mg by mouth every 6 hours as needed                     Lantus SoloStar 100 UNIT/ML pen  INSTRUCTIONS: Inject 44 Units Subcutaneous every morning  Doctor's comments: If Lantus is not covered by insurance, may substitute Basaglar at same dose and frequency.    Generic drug: insulin glargine                     metFORMIN 500 MG 24 hr tablet  Also known as: GLUCOPHAGE-XR  INSTRUCTIONS: Take 1,000 mg by mouth 2 times daily (with meals)                     morphine 15 MG CR tablet  Also known as: MS CONTIN  INSTRUCTIONS: Take 15 mg by mouth every 8 hours                     omeprazole 20 MG DR capsule  Also known as: priLOSEC  INSTRUCTIONS: Take 20 mg by mouth daily                     oxyCODONE 5 MG capsule  Also known as: OXY-IR  INSTRUCTIONS: Take 1-2 capsules by mouth every 6 hours as needed for severe pain                        92

## 2023-04-04 NOTE — PHYSICAL THERAPY INITIAL EVALUATION ADULT - DIAGNOSIS, PT EVAL
Weakness 2/2 PNA/consolidation Weakness 2/2 PNA/consolidation and Acute Hypercapnic Respiratory Failure

## 2023-04-04 NOTE — CONSULT NOTE ADULT - SUBJECTIVE AND OBJECTIVE BOX
Patient is a 67y old  Female who presents with a chief complaint of Pneumonia due to infectious organism     (04 Apr 2023 09:39)    HPI:  68 y/o F with PMH GI bleed, anemia, HTN, Type 2 DM, AMADO, obesity, COPD (on 3 L home O2), acute on chronic respiratory failure, CAD, HLD, pancreatitis, s/p Micra pacemaker, ESRD dialysis (M/W/F), s/p AV fistula placement questionable communication deficits at baseline? presented with weakness. The pt stated that she did not feel well the past few days and refused dialysis Friday and today. Reports feeling weak, tired, cough with mucus production, shortness of breath, vomiting. At the time of my evaluation the pt was unable to provide some of her history. I spoke to her daughter, Bel, over the phone who stated that her mother has not been acting like herself. She seems more confused than usual (normally she is A+Ox3) and more fatigued. This has happened in the past when her Hb has been low. She had a colonoscopy within the last 1 year which was negative. She used to get Procrit injections but has not been getting any recently. Additionally, she has been having chest congestion, shortness of breath, and cramping in her hands. The pt's daughter stated the pt has AMADO but she has not been wearing CPAP at [facility]. The pt had an AV fistula placed 3 weeks ago in the left arm.     Prior admission:  - 12/8/22: lower GI bleed likely diverticular s/p 2 units of PRBCs (Hb 7.1 at that time), treated for HCAP    ER course: VSS. Labs: Hb 7.9 (baseline ~9), .9, PLT 94, Cr 5.91, glucose 175, albumin 2.7, COVID and RVP negative.    EKG: pending, f/u result     Imaging:  - CXR: central venous catheter in SVC, micra pacemaker, RUL PNA  - CT chest: New consolidation is noted in the posterior segment of the right upper lobe. Differential diagnostic consideration includes pneumonia. 0.8 cm solid nodule in RLL unchanged from prior. Trace b/l effusions. Chronic pancreatitis. Increased left lower lobe atelectasis when compared to previous exam.    Pt was given Zoysn, Vancomycin. She is being admitted to med/surg for further management. (03 Apr 2023 21:23)      PMH: as above  PSH: as above  Meds: per reconciliation sheet, noted below  MEDICATIONS  (STANDING):  albuterol/ipratropium for Nebulization 3 milliLiter(s) Nebulizer every 4 hours  artificial  tears Solution 1 Drop(s) Both EYES two times a day  aspirin  chewable 81 milliGRAM(s) Oral daily  atorvastatin 80 milliGRAM(s) Oral at bedtime  budesonide 160 MICROgram(s)/formoterol 4.5 MICROgram(s) Inhaler 2 Puff(s) Inhalation two times a day  cefepime   IVPB 2000 milliGRAM(s) IV Intermittent <User Schedule>  cefepime  Injectable. 1000 milliGRAM(s) IV Push <User Schedule>  dextrose 5%. 1000 milliLiter(s) (100 mL/Hr) IV Continuous <Continuous>  dextrose 5%. 1000 milliLiter(s) (50 mL/Hr) IV Continuous <Continuous>  dextrose 50% Injectable 25 Gram(s) IV Push once  dextrose 50% Injectable 12.5 Gram(s) IV Push once  dextrose 50% Injectable 25 Gram(s) IV Push once  famotidine    Tablet 20 milliGRAM(s) Oral <User Schedule>  gabapentin 300 milliGRAM(s) Oral <User Schedule>  glucagon  Injectable 1 milliGRAM(s) IntraMuscular once  guaiFENesin ER 1200 milliGRAM(s) Oral every 12 hours  insulin lispro (ADMELOG) corrective regimen sliding scale   SubCutaneous three times a day before meals  insulin lispro (ADMELOG) corrective regimen sliding scale   SubCutaneous at bedtime  levothyroxine 75 MICROGram(s) Oral daily  magnesium sulfate  IVPB 2 Gram(s) IV Intermittent once  methylPREDNISolone sodium succinate Injectable 125 milliGRAM(s) IV Push once  methylPREDNISolone sodium succinate Injectable 40 milliGRAM(s) IV Push every 8 hours  midodrine. 5 milliGRAM(s) Oral three times a day  polyethylene glycol 3350 17 Gram(s) Oral daily  senna 2 Tablet(s) Oral at bedtime  sertraline 100 milliGRAM(s) Oral daily  tiotropium 2.5 MICROgram(s) Inhaler 2 Puff(s) Inhalation daily    MEDICATIONS  (PRN):  acetaminophen     Tablet .. 650 milliGRAM(s) Oral every 6 hours PRN Temp greater or equal to 38C (100.4F), Mild Pain (1 - 3)  albuterol    0.083% 2.5 milliGRAM(s) Nebulizer every 6 hours PRN Shortness of Breath and/or Wheezing  aluminum hydroxide/magnesium hydroxide/simethicone Suspension 30 milliLiter(s) Oral every 4 hours PRN Dyspepsia  bisacodyl Suppository 10 milliGRAM(s) Rectal daily PRN Constipation  dextrose Oral Gel 15 Gram(s) Oral once PRN Blood Glucose LESS THAN 70 milliGRAM(s)/deciliter  lactulose Syrup 20 Gram(s) Oral two times a day PRN constipation  melatonin 3 milliGRAM(s) Oral at bedtime PRN Insomnia  ondansetron Injectable 4 milliGRAM(s) IV Push every 8 hours PRN Nausea and/or Vomiting    Allergies    [place] (Unknown)  No Known Drug Allergies    Intolerances      Social: no smoking, no alcohol, no illegal drugs; no recent travel, no exposure to TB  FAMILY HISTORY:  FH: diabetes mellitus (Mother)    Family history of CKD (chronic kidney disease) (Mother)      no history of premature cardiovascular disease in first degree relatives    ROS: the patient denies fever, no chills, no HA, no seizures, no dizziness, no sore throat, no nasal congestion, no blurry vision, no CP, no palpitations, no SOB, no cough, no abdominal pain, no diarrhea, no N/V, no dysuria, no leg pain, no claudication, no rash, no joint aches, no rectal pain or bleeding, no night sweats  All other systems reviewed and are negative    Vital Signs Last 24 Hrs  T(C): 37.7 (04 Apr 2023 08:57), Max: 37.7 (04 Apr 2023 08:57)  T(F): 99.9 (04 Apr 2023 08:57), Max: 99.9 (04 Apr 2023 08:57)  HR: 80 (04 Apr 2023 08:57) (63 - 91)  BP: 93/42 (04 Apr 2023 08:57) (93/42 - 171/90)  BP(mean): --  RR: 18 (04 Apr 2023 08:57) (16 - 20)  SpO2: 100% (04 Apr 2023 08:57) (92% - 100%)    Parameters below as of 04 Apr 2023 08:57  Patient On (Oxygen Delivery Method): BiPAP/CPAP      Daily Height in cm: 170.18 (03 Apr 2023 20:13)    Daily     PE:    Constitutional:  No acute distress  HEENT: NC/AT, EOMI, PERRLA, conjunctivae clear; ears and nose atraumatic; pharynx benign  Neck: supple; thyroid not palpable  Back: no tenderness  Respiratory: Rhonchi noted throughout  Cardiovascular: S1S2 regular, no murmurs  Abdomen: soft, not tender, not distended, positive BS; no liver or spleen organomegaly  Genitourinary: no suprapubic tenderness  Lymphatic: no LN palpable  Musculoskeletal: no muscle tenderness, no joint swelling or tenderness  Extremities: no pedal edema  Neurological/ Psychiatric: AxOx3, judgement and insight normal; moving all extremities  Skin: no rashes; no palpable lesions    Labs: all available labs reviewed                        7.1    10.20 )-----------( 104      ( 04 Apr 2023 07:59 )             24.4     04-04    135  |  100  |  31<H>  ----------------------------<  111<H>  4.2   |  29  |  3.69<H>    Ca    8.3<L>      04 Apr 2023 07:59    TPro  6.4  /  Alb  2.6<L>  /  TBili  0.5  /  DBili  x   /  AST  9<L>  /  ALT  10<L>  /  AlkPhos  65  04-04     LIVER FUNCTIONS - ( 04 Apr 2023 07:59 )  Alb: 2.6 g/dL / Pro: 6.4 gm/dL / ALK PHOS: 65 U/L / ALT: 10 U/L / AST: 9 U/L / GGT: x                     (04-03 @ 10:53)  [facility]      Radiology: all available radiological tests reviewed    Advanced directives addressed: full resuscitation Patient is a 67y old  Female who presents with a chief complaint of Pneumonia due to infectious organism    HPI:  66 y/o F with h/o GI bleed, anemia, HTN, Type 2 DM, AMADO, obesity, COPD (on 3 L home O2), acute on chronic respiratory failure, CAD, HLD, pancreatitis, s/p Micra pacemaker, ESRD dialysis (M/W/F), AV fistula placement 3 weeks ago, questionable communication deficits at baseline was admitted on 4/3 for increased weakness. The pt stated that she did not feel well the past few days and refused dialysis on the day prior of admission. Reported feeling weak, tired, cough with mucus production, shortness of breath. At the time of my evaluation the pt was unable to provide some of her history. She seems more confused than usual (normally she is A+Ox3) and more fatigued. This has happened in the past when her Hb has been low. She used to get Procrit injections but has not been getting any recently. Additionally, she has been having chest congestion, shortness of breath, and cramping in her hands. In ER she received zosyn and vancomycin IV, then cefepime.     Prior admission: 12/8/22: lower GI bleed likely diverticular s/p 2 units of PRBCs (Hb 7.1 at that time), treated for HCAP    PMH: as above  She had a colonoscopy within the last 1 year which was negative.   The pt's daughter stated the pt has AMADO but she has not been wearing CPAP at [facility].  PSH: as above  Meds: per reconciliation sheet, noted below  MEDICATIONS  (STANDING):  albuterol/ipratropium for Nebulization 3 milliLiter(s) Nebulizer every 4 hours  artificial  tears Solution 1 Drop(s) Both EYES two times a day  aspirin  chewable 81 milliGRAM(s) Oral daily  atorvastatin 80 milliGRAM(s) Oral at bedtime  budesonide 160 MICROgram(s)/formoterol 4.5 MICROgram(s) Inhaler 2 Puff(s) Inhalation two times a day  cefepime   IVPB 2000 milliGRAM(s) IV Intermittent <User Schedule>  cefepime  Injectable. 1000 milliGRAM(s) IV Push <User Schedule>  dextrose 5%. 1000 milliLiter(s) (100 mL/Hr) IV Continuous <Continuous>  dextrose 5%. 1000 milliLiter(s) (50 mL/Hr) IV Continuous <Continuous>  dextrose 50% Injectable 25 Gram(s) IV Push once  dextrose 50% Injectable 12.5 Gram(s) IV Push once  dextrose 50% Injectable 25 Gram(s) IV Push once  famotidine    Tablet 20 milliGRAM(s) Oral <User Schedule>  gabapentin 300 milliGRAM(s) Oral <User Schedule>  glucagon  Injectable 1 milliGRAM(s) IntraMuscular once  guaiFENesin ER 1200 milliGRAM(s) Oral every 12 hours  insulin lispro (ADMELOG) corrective regimen sliding scale   SubCutaneous three times a day before meals  insulin lispro (ADMELOG) corrective regimen sliding scale   SubCutaneous at bedtime  levothyroxine 75 MICROGram(s) Oral daily  magnesium sulfate  IVPB 2 Gram(s) IV Intermittent once  methylPREDNISolone sodium succinate Injectable 125 milliGRAM(s) IV Push once  methylPREDNISolone sodium succinate Injectable 40 milliGRAM(s) IV Push every 8 hours  midodrine. 5 milliGRAM(s) Oral three times a day  polyethylene glycol 3350 17 Gram(s) Oral daily  senna 2 Tablet(s) Oral at bedtime  sertraline 100 milliGRAM(s) Oral daily  tiotropium 2.5 MICROgram(s) Inhaler 2 Puff(s) Inhalation daily    MEDICATIONS  (PRN):  acetaminophen     Tablet .. 650 milliGRAM(s) Oral every 6 hours PRN Temp greater or equal to 38C (100.4F), Mild Pain (1 - 3)  albuterol    0.083% 2.5 milliGRAM(s) Nebulizer every 6 hours PRN Shortness of Breath and/or Wheezing  aluminum hydroxide/magnesium hydroxide/simethicone Suspension 30 milliLiter(s) Oral every 4 hours PRN Dyspepsia  bisacodyl Suppository 10 milliGRAM(s) Rectal daily PRN Constipation  dextrose Oral Gel 15 Gram(s) Oral once PRN Blood Glucose LESS THAN 70 milliGRAM(s)/deciliter  lactulose Syrup 20 Gram(s) Oral two times a day PRN constipation  melatonin 3 milliGRAM(s) Oral at bedtime PRN Insomnia  ondansetron Injectable 4 milliGRAM(s) IV Push every 8 hours PRN Nausea and/or Vomiting    Allergies    [place] (Unknown)  No Known Drug Allergies    Intolerances      Social: no smoking, no alcohol, no illegal drugs; no recent travel, no exposure to TB  FAMILY HISTORY:  FH: diabetes mellitus (Mother)    Family history of CKD (chronic kidney disease) (Mother)      no history of premature cardiovascular disease in first degree relatives    ROS: the patient denies fever, no chills, no HA, no seizures, no dizziness, no sore throat, no nasal congestion, no blurry vision, no CP, no palpitations, has SOB, has cough, no abdominal pain, no diarrhea, no N/V, no dysuria, no leg pain, no rash, no joint aches, no rectal pain or bleeding, no night sweats  All other systems reviewed and are negative    Vital Signs Last 24 Hrs  T(C): 37.7 (04 Apr 2023 08:57), Max: 37.7 (04 Apr 2023 08:57)  T(F): 99.9 (04 Apr 2023 08:57), Max: 99.9 (04 Apr 2023 08:57)  HR: 80 (04 Apr 2023 08:57) (63 - 91)  BP: 93/42 (04 Apr 2023 08:57) (93/42 - 171/90)  BP(mean): --  RR: 18 (04 Apr 2023 08:57) (16 - 20)  SpO2: 100% (04 Apr 2023 08:57) (92% - 100%)    Parameters below as of 04 Apr 2023 08:57  Patient On (Oxygen Delivery Method): BiPAP/CPAP      Daily Height in cm: 170.18 (03 Apr 2023 20:13)    Daily     PE:    Constitutional:  No acute distress  HEENT: NC/AT, EOMI, PERRLA, conjunctivae clear; ears and nose atraumatic; pharynx benign  Neck: supple; thyroid not palpable  Back: no tenderness  Respiratory: Rhonchi noted throughout  Cardiovascular: S1S2 regular, no murmurs  Abdomen: soft, not tender, not distended, positive BS; no liver or spleen organomegaly  Genitourinary: no suprapubic tenderness  Lymphatic: no LN palpable  Musculoskeletal: no muscle tenderness, no joint swelling or tenderness  Extremities: no pedal edema  Neurological/ Psychiatric: AxOx3, judgement and insight normal; moving all extremities  Skin: no rashes; no palpable lesions    Labs: all available labs reviewed                        7.1    10.20 )-----------( 104      ( 04 Apr 2023 07:59 )             24.4     04-04    135  |  100  |  31<H>  ----------------------------<  111<H>  4.2   |  29  |  3.69<H>    Ca    8.3<L>      04 Apr 2023 07:59    TPro  6.4  /  Alb  2.6<L>  /  TBili  0.5  /  DBili  x   /  AST  9<L>  /  ALT  10<L>  /  AlkPhos  65  04-04     LIVER FUNCTIONS - ( 04 Apr 2023 07:59 )  Alb: 2.6 g/dL / Pro: 6.4 gm/dL / ALK PHOS: 65 U/L / ALT: 10 U/L / AST: 9 U/L / GGT: x           (04-03 @ 10:53)  [facility]      Radiology: all available radiological tests reviewed    - CXR: central venous catheter in SVC, micra pacemaker, RUL PNA  - CT chest: New consolidation is noted in the posterior segment of the right upper lobe. Differential diagnostic consideration includes pneumonia. 0.8 cm solid nodule in RLL unchanged from prior. Trace b/l effusions. Chronic pancreatitis. Increased left lower lobe atelectasis when compared to previous exam.    Advanced directives addressed: full resuscitation Patient is a 67y old  Female who presents with a chief complaint of Pneumonia due to infectious organism    HPI:  68 y/o F with h/o GI bleed, anemia, HTN, Type 2 DM, AMADO, obesity, COPD (on 3 L home O2), acute on chronic respiratory failure, CAD, HLD, pancreatitis, s/p Micra pacemaker, ESRD dialysis (M/W/F), AV fistula placement 3 weeks ago, questionable communication deficits at baseline was admitted on 4/3 for increased weakness. The pt stated that she did not feel well the past few days and refused dialysis on the day prior of admission. Reported feeling weak, tired, cough with mucus production, shortness of breath. At the time of my evaluation the pt was unable to provide some of her history. She seems more confused than usual (normally she is A+Ox3) and more fatigued. This has happened in the past when her Hb has been low. She used to get Procrit injections but has not been getting any recently. Additionally, she has been having chest congestion, shortness of breath, and cramping in her hands. In ER she received zosyn and vancomycin IV, then cefepime.     Prior admission: 12/8/22: lower GI bleed likely diverticular s/p 2 units of PRBCs (Hb 7.1 at that time), treated for HCAP    PMH: as above  She had a colonoscopy within the last 1 year which was negative.   The pt's daughter stated the pt has AMADO but she has not been wearing CPAP at [facility].  PSH: as above  Meds: per reconciliation sheet, noted below  MEDICATIONS  (STANDING):  albuterol/ipratropium for Nebulization 3 milliLiter(s) Nebulizer every 4 hours  artificial  tears Solution 1 Drop(s) Both EYES two times a day  aspirin  chewable 81 milliGRAM(s) Oral daily  atorvastatin 80 milliGRAM(s) Oral at bedtime  budesonide 160 MICROgram(s)/formoterol 4.5 MICROgram(s) Inhaler 2 Puff(s) Inhalation two times a day  cefepime   IVPB 2000 milliGRAM(s) IV Intermittent <User Schedule>  cefepime  Injectable. 1000 milliGRAM(s) IV Push <User Schedule>  dextrose 5%. 1000 milliLiter(s) (100 mL/Hr) IV Continuous <Continuous>  dextrose 5%. 1000 milliLiter(s) (50 mL/Hr) IV Continuous <Continuous>  dextrose 50% Injectable 25 Gram(s) IV Push once  dextrose 50% Injectable 12.5 Gram(s) IV Push once  dextrose 50% Injectable 25 Gram(s) IV Push once  famotidine    Tablet 20 milliGRAM(s) Oral <User Schedule>  gabapentin 300 milliGRAM(s) Oral <User Schedule>  glucagon  Injectable 1 milliGRAM(s) IntraMuscular once  guaiFENesin ER 1200 milliGRAM(s) Oral every 12 hours  insulin lispro (ADMELOG) corrective regimen sliding scale   SubCutaneous three times a day before meals  insulin lispro (ADMELOG) corrective regimen sliding scale   SubCutaneous at bedtime  levothyroxine 75 MICROGram(s) Oral daily  magnesium sulfate  IVPB 2 Gram(s) IV Intermittent once  methylPREDNISolone sodium succinate Injectable 125 milliGRAM(s) IV Push once  methylPREDNISolone sodium succinate Injectable 40 milliGRAM(s) IV Push every 8 hours  midodrine. 5 milliGRAM(s) Oral three times a day  polyethylene glycol 3350 17 Gram(s) Oral daily  senna 2 Tablet(s) Oral at bedtime  sertraline 100 milliGRAM(s) Oral daily  tiotropium 2.5 MICROgram(s) Inhaler 2 Puff(s) Inhalation daily    MEDICATIONS  (PRN):  acetaminophen     Tablet .. 650 milliGRAM(s) Oral every 6 hours PRN Temp greater or equal to 38C (100.4F), Mild Pain (1 - 3)  albuterol    0.083% 2.5 milliGRAM(s) Nebulizer every 6 hours PRN Shortness of Breath and/or Wheezing  aluminum hydroxide/magnesium hydroxide/simethicone Suspension 30 milliLiter(s) Oral every 4 hours PRN Dyspepsia  bisacodyl Suppository 10 milliGRAM(s) Rectal daily PRN Constipation  dextrose Oral Gel 15 Gram(s) Oral once PRN Blood Glucose LESS THAN 70 milliGRAM(s)/deciliter  lactulose Syrup 20 Gram(s) Oral two times a day PRN constipation  melatonin 3 milliGRAM(s) Oral at bedtime PRN Insomnia  ondansetron Injectable 4 milliGRAM(s) IV Push every 8 hours PRN Nausea and/or Vomiting    Allergies    [place] (Unknown)  No Known Drug Allergies    Intolerances      Social: no smoking, no alcohol, no illegal drugs; no recent travel, no exposure to TB  FAMILY HISTORY:  FH: diabetes mellitus (Mother)    Family history of CKD (chronic kidney disease) (Mother)      no history of premature cardiovascular disease in first degree relatives    ROS: the patient denies fever, no chills, no HA, no seizures, no dizziness, no sore throat, no nasal congestion, no blurry vision, no CP, no palpitations, has SOB, has cough, no abdominal pain, no diarrhea, no N/V, no dysuria, no leg pain, no rash, no joint aches, no rectal pain or bleeding, no night sweats  All other systems reviewed and are negative    Vital Signs Last 24 Hrs  T(C): 37.7 (04 Apr 2023 08:57), Max: 37.7 (04 Apr 2023 08:57)  T(F): 99.9 (04 Apr 2023 08:57), Max: 99.9 (04 Apr 2023 08:57)  HR: 80 (04 Apr 2023 08:57) (63 - 91)  BP: 93/42 (04 Apr 2023 08:57) (93/42 - 171/90)  BP(mean): --  RR: 18 (04 Apr 2023 08:57) (16 - 20)  SpO2: 100% (04 Apr 2023 08:57) (92% - 100%)    Parameters below as of 04 Apr 2023 08:57  Patient On (Oxygen Delivery Method): BiPAP/CPAP      Daily Height in cm: 170.18 (03 Apr 2023 20:13)    Daily     PE:    Constitutional:  No acute distress  HEENT: NC/AT, EOMI, PERRLA, conjunctivae clear; ears and nose atraumatic; pharynx benign  Neck: supple; thyroid not palpable  Right chest Tessio line in place  Back: no tenderness  Respiratory: b/l Rhonchi   Cardiovascular: S1S2 regular, no murmurs  Abdomen: soft, not tender, not distended, positive BS; no liver or spleen organomegaly  Genitourinary: no suprapubic tenderness  Lymphatic: no LN palpable  Musculoskeletal: no muscle tenderness, no joint swelling or tenderness  Extremities: no pedal edema  Neurological/ Psychiatric: AxOx3, judgement and insight impaired; moving all extremities  Skin: no rashes; no palpable lesions    Labs: all available labs reviewed                        7.1    10.20 )-----------( 104      ( 04 Apr 2023 07:59 )             24.4     04-04    135  |  100  |  31<H>  ----------------------------<  111<H>  4.2   |  29  |  3.69<H>    Ca    8.3<L>      04 Apr 2023 07:59    TPro  6.4  /  Alb  2.6<L>  /  TBili  0.5  /  DBili  x   /  AST  9<L>  /  ALT  10<L>  /  AlkPhos  65  04-04     LIVER FUNCTIONS - ( 04 Apr 2023 07:59 )  Alb: 2.6 g/dL / Pro: 6.4 gm/dL / ALK PHOS: 65 U/L / ALT: 10 U/L / AST: 9 U/L / GGT: x           (04-03 @ 10:53)  [facility]      Radiology: all available radiological tests reviewed    - CXR: central venous catheter in SVC, micra pacemaker, RUL PNA  - CT chest: New consolidation is noted in the posterior segment of the right upper lobe. Differential diagnostic consideration includes pneumonia. 0.8 cm solid nodule in RLL unchanged from prior. Trace b/l effusions. Chronic pancreatitis. Increased left lower lobe atelectasis when compared to previous exam.    Advanced directives addressed: full resuscitation

## 2023-04-04 NOTE — DIETITIAN INITIAL EVALUATION ADULT - PERTINENT LABORATORY DATA
04-04    135  |  100  |  31<H>  ----------------------------<  111<H>  4.2   |  29  |  3.69<H>    Ca    8.3<L>      04 Apr 2023 07:59    TPro  6.4  /  Alb  2.6<L>  /  TBili  0.5  /  DBili  x   /  AST  9<L>  /  ALT  10<L>  /  AlkPhos  65  04-04  POCT Blood Glucose.: 160 mg/dL (04-04-23 @ 06:14)  A1C with Estimated Average Glucose Result: 5.8 % (12-07-22 @ 06:15)

## 2023-04-04 NOTE — DIETITIAN INITIAL EVALUATION ADULT - ADD RECOMMEND
1) Advance diet to fulls / or CHO consistent/renal restricted diet when medically feasible. If labs WNL, consider liberalizing diet to regular.   2) Obtain hgb A1C, obtain phosphorus lab when medically feasible  3) Monitor bowel movements, if no BM for >3 days, consider implementing bowel regimen.  4) Maintain BG levels 140-180mg/dL  5) Add neprovite w/ minerals daily to ensure 100% RDA met  6) Encourage protein-rich foods, maximize food preferences  7) Obtain vitamin D 25OH level to assess nutriture  8) Will provide diabetes/renal nutrition education prn   RD will continue to monitor PO intake, labs, hydration, and wt prn.

## 2023-04-04 NOTE — CONSULT NOTE ADULT - ASSESSMENT
66 y/o F with PMH GI bleed, anemia, HTN, Type 2 DM, AMADO, obesity, COPD (on 3 L home O2), acute on chronic respiratory failure, CAD, HLD, pancreatitis, s/p Micra pacemaker, ESRD dialysis (M/W/F)- missed 1 session, s/p AV fistula placement with complaints of generalized weakness.    1.RUL consolidation likely PNA. Metabolic encephalopathy. ESRD dialysis (M/W/F).   -afabrile   -WBC WNL  -on cefepime   -tolerating abx well so far; no side effects noted  -CT chest reviewed- consolidation of RUL  -CT head reviewed- no acute findings  -Change to ceftriaxone and azithromycin  -reason for abx use and side effects reviewed  -continue abx coverage  -monitor temps  -f/u cbc and BMP  -Supportive care  2.other issues  -care per med   66 y/o F with h/o GI bleed, anemia, HTN, Type 2 DM, AMADO, obesity, COPD (on 3 L home O2), chronic respiratory failure, CAD, HLD, pancreatitis, s/p Micra pacemaker, ESRD dialysis (M/W/F), AV fistula placement 3 weeks ago, questionable communication deficits at baseline was admitted on 4/3 for increased weakness. The pt stated that she did not feel well the past few days and refused dialysis on the day prior of admission. Reported feeling weak, tired, cough with mucus production, shortness of breath. At the time of my evaluation the pt was unable to provide some of her history. She seems more confused than usual (normally she is A+Ox3) and more fatigued. This has happened in the past when her Hb has been low. She used to get Procrit injections but has not been getting any recently. Additionally, she has been having chest congestion, shortness of breath, and cramping in her hands. In ER she received zosyn and vancomycin IV, then cefepime.     1. Acute on chronic respiratory failure. RUL consolidation likely PNA. COPD. Metabolic encephalopathy. ESRD dialysis (M/W/F).   -low grade fever  -WBC WNL  -start cefepime 2 gm IV qHD and azithromycin 500 mg PO qd  -reason for abx use and side effects reviewed with patient; monitor BMP   -respiratory care  -continue abx coverage  -monitor temps  -f/u cbc and BMP  -Supportive care  2.other issues  -care per med

## 2023-04-04 NOTE — PROGRESS NOTE ADULT - SUBJECTIVE AND OBJECTIVE BOX
HOSPITALIST ATTENDING PROGRESS NOTE     Chart and meds reviewed. Patient seen and examined     CC: Weakness     Subjective: Seen and evaluated at bedside. On NIPPV. Lethargic in AM but mentation more improved in the PM.       All other systems and founds to be negative with exception of what has been described above.         PHYSICAL EXAM:  Vital Signs Last 24 Hrs  T(C): 36.8 (04 Apr 2023 16:50), Max: 37.9 (04 Apr 2023 11:30)  T(F): 98.2 (04 Apr 2023 16:50), Max: 100.3 (04 Apr 2023 11:30)  HR: 75 (04 Apr 2023 16:50) (63 - 95)  BP: 120/62 (04 Apr 2023 16:50) (93/42 - 131/71)  RR: 20 (04 Apr 2023 13:14) (18 - 20)  SpO2: 100% (04 Apr 2023 16:50) (92% - 100%)    Parameters below as of 04 Apr 2023 16:50  Patient On (Oxygen Delivery Method): BiPAP/CPAP        GEN: NAD   HEENT: EOMI, normal hearing, moist mucous membranes  NECK : Soft and supple, no JVD  LUNG: +diffuse rhonchi, +NIPPV in place  CVS: S1S2+, RRR, no M/G/R, +right tessio  GI: BS+, soft, NT/ND, no guarding, no rebound  EXTREMITIES: No peripheral edema, +left AVF  VASCULAR: 2+ peripheral pulses  NEURO: AAOx2, grossly non-focal   MSK: 5/5 strength b/l upper and lower extremities  SKIN: No rashes    MEDICATIONS:  MEDICATIONS  (STANDING):  albuterol/ipratropium for Nebulization 3 milliLiter(s) Nebulizer every 4 hours  artificial  tears Solution 1 Drop(s) Both EYES two times a day  aspirin  chewable 81 milliGRAM(s) Oral daily  atorvastatin 80 milliGRAM(s) Oral at bedtime  azithromycin   Tablet 500 milliGRAM(s) Oral daily  budesonide 160 MICROgram(s)/formoterol 4.5 MICROgram(s) Inhaler 2 Puff(s) Inhalation two times a day  cefepime   IVPB 2000 milliGRAM(s) IV Intermittent <User Schedule>  dextrose 5%. 1000 milliLiter(s) (100 mL/Hr) IV Continuous <Continuous>  dextrose 5%. 1000 milliLiter(s) (50 mL/Hr) IV Continuous <Continuous>  dextrose 50% Injectable 25 Gram(s) IV Push once  dextrose 50% Injectable 12.5 Gram(s) IV Push once  dextrose 50% Injectable 25 Gram(s) IV Push once  famotidine    Tablet 20 milliGRAM(s) Oral <User Schedule>  gabapentin 300 milliGRAM(s) Oral <User Schedule>  glucagon  Injectable 1 milliGRAM(s) IntraMuscular once  guaiFENesin ER 1200 milliGRAM(s) Oral every 12 hours  insulin lispro (ADMELOG) corrective regimen sliding scale   SubCutaneous three times a day before meals  insulin lispro (ADMELOG) corrective regimen sliding scale   SubCutaneous at bedtime  levothyroxine 75 MICROGram(s) Oral daily  methylPREDNISolone sodium succinate Injectable 40 milliGRAM(s) IV Push every 8 hours  midodrine. 5 milliGRAM(s) Oral three times a day  polyethylene glycol 3350 17 Gram(s) Oral daily  senna 2 Tablet(s) Oral at bedtime  sertraline 100 milliGRAM(s) Oral daily  tiotropium 2.5 MICROgram(s) Inhaler 2 Puff(s) Inhalation daily      LABS: All Labs Reviewed:                        7.1    10.20 )-----------( 104      ( 04 Apr 2023 07:59 )             24.4     04-04    135  |  100  |  31<H>  ----------------------------<  111<H>  4.2   |  29  |  3.69<H>    Ca    8.3<L>      04 Apr 2023 07:59    TPro  6.4  /  Alb  2.6<L>  /  TBili  0.5  /  DBili  x   /  AST  9<L>  /  ALT  10<L>  /  AlkPhos  65  04-04    PT/INR - ( 03 Apr 2023 11:15 )   PT: 12.9 sec;   INR: 1.11 ratio         PTT - ( 03 Apr 2023 11:15 )  PTT:30.4 sec      Blood Culture: 04-03 @ 11:48  Organism --  Gram Stain Blood -- Gram Stain --  Specimen Source .Blood None  Culture-Blood --    04-03 @ 11:15  Organism --  Gram Stain Blood -- Gram Stain --  Specimen Source .Blood None  Culture-Blood --      I&O's Summary    04 Apr 2023 07:01  -  04 Apr 2023 19:54  --------------------------------------------------------  IN: 550 mL / OUT: 2500 mL / NET: -1950 mL      CAPILLARY BLOOD GLUCOSE      POCT Blood Glucose.: 136 mg/dL (04 Apr 2023 17:48)  POCT Blood Glucose.: 110 mg/dL (04 Apr 2023 11:56)  POCT Blood Glucose.: 160 mg/dL (04 Apr 2023 06:14)  POCT Blood Glucose.: 143 mg/dL (03 Apr 2023 23:13)      RADIOLOGY/EKG:    < from: CT Head No Cont (04.03.23 @ 21:15) >  FINDINGS:  Limited study due to motion.    There are areas of low attenuation in the periventricular white matter   likely related to mild chronic microvascular ischemic changes.    There is no acute intracranial hemorrhage, parenchymal mass, mass effect   or midline shift. There is no acute territorial infarct. There is no   hydrocephalus.    The cranium is intact. The visualized paranasal sinuses are well-aerated.   Partial opacification bilateral mastoid air cells noted        IMPRESSION:  Limited study due to motion    No acute intracranial hemorrhage or acute territorial infarct.  If   symptoms persist, follow-up MRI exam recommended.    < end of copied text >      < from: CT Abdomen and Pelvis No Cont (04.03.23 @ 21:15) >  FINDINGS:  LOWER CHEST: Basilar opacities. Trace left pleural effusion. Stable 7 mm   right lower lobe nodule. Cardiomegaly. Right ventricular intracardiac   device. Partially imaged central venous catheter tip in right atrium.   Coronary artery calcifications.    Please note that evaluation of the abdominal organs and vascular   structures is limited by lack of intravenous contrast.    LIVER: Within normal limits.  BILE DUCTS: Normal caliber.  GALLBLADDER: Cholecystectomy.  SPLEEN: Within normal limits.  PANCREAS: Diffuse calcifications.  ADRENALS: Within normal limits.  KIDNEYS/URETERS: Nonobstructive renal calculi and renal vascular   calcifications. No hydronephrosis.    BLADDER: Contains dense material, possibly excreted intravenous contrast.  REPRODUCTIVE ORGANS: Hysterectomy.    BOWEL: No bowel obstruction. Appendix is within normal limits. Scattered   colonic diverticula. Rectum distended by stool with mild rectal wall   thickening and inflammatory changes.  PERITONEUM: No ascites. Resectable edema.  VESSELS: Atherosclerotic calcifications. Inferior vena cava filter.  RETROPERITONEUM/LYMPH NODES: No lymphadenopathy.  ABDOMINAL WALL: Postsurgical changes.  BONES: Degenerative changes. Stable L2 wedge deformity.    IMPRESSION:    1. Basilar lung opacities, question pneumonia and/or atelectasis.  2. Rectum distended by stool with mild wall thickening and inflammation,   compatible with stercoral colitis.    < end of copied text >

## 2023-04-04 NOTE — CONSULT NOTE ADULT - SUBJECTIVE AND OBJECTIVE BOX
Patient is a 67y old  Female who presents with a chief complaint of Weakness (04 Apr 2023 19:54)      BRIEF HOSPITAL COURSE: 67F with GIB, anemia, HTN, T2DM, AMADO, obesity, COPD (on 3L home O2), acute on chronic respiratory failure, CAD, HLD, pancreatitis, s/p Micra pacemaker, ESRD on HD MWF via L AVF placed 3 weeks ago who presented with acute onset weakness after refusing dialysis the Friday and Monday prior to presentation. She reported associated weakness, tiredness, productive cough, and SOB.    In ER course: VSS. Labs: Hb 7.9 (baseline ~9), .9, PLT 94, Cr 5.91, glucose 175, albumin 2.7, COVID and RVP negative. CXR: central venous catheter in SVC, micra pacemaker, RUL PNA, CT chest: New consolidation is noted in the posterior segment of the right upper lobe. Differential diagnostic consideration includes pneumonia. 0.8 cm solid nodule in RLL unchanged from prior. Trace b/l effusions. Chronic pancreatitis. Increased left lower lobe atelectasis when compared to previous exam. Pt was given Zoysn, Vancomycin. pat seen for pulmonary on bipap-ABG - ( 04 Apr 2023 11:03 ) pH, Arterial: 7.21  pH, Blood: x     /  pCO2: 71    /  pO2: 84    / HCO3: 28    / Base Excess: -1.3  /  SaO2: 97     Events last 24 hours: ***    PAST MEDICAL & SURGICAL HISTORY:  HTN (hypertension)      Asthma with chronic obstructive pulmonary disease (COPD)      HLD (hyperlipidemia)      Diabetes      CHF (congestive heart failure)      Lower GI bleed      Anemia      AMADO (obstructive sleep apnea)      Obesity      On home oxygen therapy      CAD (coronary artery disease)      Pancreatitis      ESRD on dialysis      Pacemaker      S/P cholecystectomy      AV fistula      History of ankle surgery          Review of Systems:  CONSTITUTIONAL: No fever, chills, or fatigue  EYES: No eye pain, visual disturbances, or discharge  ENMT:  No difficulty hearing, tinnitus, vertigo; No sinus or throat pain  NECK: No pain or stiffness  RESPIRATORY: No cough, wheezing, chills or hemoptysis; No shortness of breath  CARDIOVASCULAR: No chest pain, palpitations, dizziness, or leg swelling  GASTROINTESTINAL: No abdominal or epigastric pain. No nausea, vomiting, or hematemesis; No diarrhea or constipation. No melena or hematochezia.  GENITOURINARY: No dysuria, frequency, hematuria, or incontinence  NEUROLOGICAL: No headaches, memory loss, loss of strength, numbness, or tremors  SKIN: No itching, burning, rashes, or lesions   MUSCULOSKELETAL: No joint pain or swelling; No muscle, back, or extremity pain  PSYCHIATRIC: No depression, anxiety, mood swings, or difficulty sleeping      Medications:  azithromycin   Tablet 500 milliGRAM(s) Oral daily  cefepime   IVPB 2000 milliGRAM(s) IV Intermittent <User Schedule>    midodrine. 5 milliGRAM(s) Oral three times a day    albuterol    0.083% 2.5 milliGRAM(s) Nebulizer every 6 hours PRN  albuterol/ipratropium for Nebulization 3 milliLiter(s) Nebulizer every 4 hours  budesonide 160 MICROgram(s)/formoterol 4.5 MICROgram(s) Inhaler 2 Puff(s) Inhalation two times a day  guaiFENesin ER 1200 milliGRAM(s) Oral every 12 hours  tiotropium 2.5 MICROgram(s) Inhaler 2 Puff(s) Inhalation daily    acetaminophen     Tablet .. 650 milliGRAM(s) Oral every 6 hours PRN  gabapentin 300 milliGRAM(s) Oral <User Schedule>  melatonin 3 milliGRAM(s) Oral at bedtime PRN  ondansetron Injectable 4 milliGRAM(s) IV Push every 8 hours PRN  sertraline 100 milliGRAM(s) Oral daily      aspirin  chewable 81 milliGRAM(s) Oral daily    aluminum hydroxide/magnesium hydroxide/simethicone Suspension 30 milliLiter(s) Oral every 4 hours PRN  bisacodyl Suppository 10 milliGRAM(s) Rectal daily PRN  famotidine    Tablet 20 milliGRAM(s) Oral <User Schedule>  lactulose Syrup 20 Gram(s) Oral two times a day PRN  polyethylene glycol 3350 17 Gram(s) Oral daily  senna 2 Tablet(s) Oral at bedtime      atorvastatin 80 milliGRAM(s) Oral at bedtime  dextrose 50% Injectable 25 Gram(s) IV Push once  dextrose 50% Injectable 12.5 Gram(s) IV Push once  dextrose 50% Injectable 25 Gram(s) IV Push once  dextrose Oral Gel 15 Gram(s) Oral once PRN  glucagon  Injectable 1 milliGRAM(s) IntraMuscular once  insulin lispro (ADMELOG) corrective regimen sliding scale   SubCutaneous three times a day before meals  insulin lispro (ADMELOG) corrective regimen sliding scale   SubCutaneous at bedtime  levothyroxine 75 MICROGram(s) Oral daily  methylPREDNISolone sodium succinate Injectable 40 milliGRAM(s) IV Push every 8 hours    dextrose 5%. 1000 milliLiter(s) IV Continuous <Continuous>  dextrose 5%. 1000 milliLiter(s) IV Continuous <Continuous>      artificial  tears Solution 1 Drop(s) Both EYES two times a day            ICU Vital Signs Last 24 Hrs  T(C): 36.8 (04 Apr 2023 16:50), Max: 37.9 (04 Apr 2023 11:30)  T(F): 98.2 (04 Apr 2023 16:50), Max: 100.3 (04 Apr 2023 11:30)  HR: 82 (04 Apr 2023 20:06) (64 - 95)  BP: 120/62 (04 Apr 2023 16:50) (93/42 - 131/71)  BP(mean): --  ABP: --  ABP(mean): --  RR: 20 (04 Apr 2023 13:14) (18 - 20)  SpO2: 98% (04 Apr 2023 20:06) (92% - 100%)    O2 Parameters below as of 04 Apr 2023 16:50  Patient On (Oxygen Delivery Method): BiPAP/CPAP            ABG - ( 04 Apr 2023 18:20 )  pH, Arterial: 7.20  pH, Blood: x     /  pCO2: 67    /  pO2: 84    / HCO3: 26    / Base Excess: -3.3  /  SaO2: 98.0                I&O's Detail    04 Apr 2023 07:01  -  04 Apr 2023 21:50  --------------------------------------------------------  IN:    IV PiggyBack: 50 mL    Other (mL): 500 mL  Total IN: 550 mL    OUT:    Other (mL): 2500 mL  Total OUT: 2500 mL    Total NET: -1950 mL            LABS:                        7.1    10.20 )-----------( 104      ( 04 Apr 2023 07:59 )             24.4     04-04    135  |  100  |  31<H>  ----------------------------<  111<H>  4.2   |  29  |  3.69<H>    Ca    8.3<L>      04 Apr 2023 07:59    TPro  6.4  /  Alb  2.6<L>  /  TBili  0.5  /  DBili  x   /  AST  9<L>  /  ALT  10<L>  /  AlkPhos  65  04-04          CAPILLARY BLOOD GLUCOSE      POCT Blood Glucose.: 136 mg/dL (04 Apr 2023 17:48)    PT/INR - ( 03 Apr 2023 11:15 )   PT: 12.9 sec;   INR: 1.11 ratio         PTT - ( 03 Apr 2023 11:15 )  PTT:30.4 sec    CULTURES:  Culture Results:   No growth to date. (04-03-23 @ 11:48)  Culture Results:   No growth to date. (04-03-23 @ 11:15)  Rapid RVP Result: NotDetec (04-03-23 @ 10:53)      Physical Examination:    General: No acute distress.  Alert, oriented, interactive, nonfocal, following simple commands and moving all extremities     HEENT: Pupils equal, reactive to light.  Symmetric.    PULM: Breathing comfortabley, good BS B/L with no Rales or Rhonchi, no significant sputum production    CVS: Regular rate and rhythm, no murmurs, rubs, or gallops    ABD: BS+ Soft, nondistended, nontender, no masses    EXT: No edema, nontender    SKIN: Warm and well perfused, no rashes noted.    RADIOLOGY: ***    CRITICAL CARE TIME SPENT: *** mins of time have been spent evaluating, reviewing all available lab and radiologic material, reviewing the EMR and treating this critically ill patient, who has complex medical problems and life threatening organ dysfunction. This also included speaking with the staff, consultants, and family.     Patient is a 67y old  Female who presents with a chief complaint of Weakness (04 Apr 2023 19:54)      BRIEF HOSPITAL COURSE: 67F with GIB, anemia, HTN, T2DM, AMADO, obesity, COPD (on 3L home O2), acute on chronic respiratory failure, CAD, HLD, pancreatitis, s/p Micra pacemaker, ESRD on HD MWF via L AVF placed 3 weeks ago who presented with acute onset weakness after refusing dialysis the Friday and Monday prior to presentation. She reported associated weakness, tiredness, productive cough, and SOB. Upon arrival to ED yesterday, patient was HDS with labs notable for Hgb 7.9 (baseline ~9), .9, PLT 94, Cr 5.91, glucose 175, albumin 2.7, COVID and RVP negative. CXR and CT Chest consistent with RUL PNA, 0.8 cm solid nodule in RLL unchanged from prior, trace b/l pleural effusions. S/p Vanc/Zosyn, now on empiric Azithro / Cefepime. Initially placed on BiPAP for acute hypercapnic respiratory failure with worsening respiratory acidosis (7.21/71/84/28) warranting MICU consult.         PAST MEDICAL & SURGICAL HISTORY:  HTN (hypertension)  Asthma with chronic obstructive pulmonary disease (COPD)  HLD (hyperlipidemia)  Diabetes  CHF (congestive heart failure)  Lower GI bleed  Anemia  AMADO (obstructive sleep apnea  Obesity  On home oxygen therapy  CAD (coronary artery disease)  Pancreatitis  ESRD on dialysis  Pacemaker  S/P cholecystectomy  AV fistula  History of ankle surgery          Review of Systems:  CONSTITUTIONAL: No fever, chills, or fatigue  EYES: No eye pain, visual disturbances, or discharge  ENMT:  No difficulty hearing, tinnitus, vertigo; No sinus or throat pain  NECK: No pain or stiffness  RESPIRATORY: +cough. No wheezing, chills or hemoptysis; No shortness of breath  CARDIOVASCULAR: No chest pain, palpitations, dizziness, or leg swelling  GASTROINTESTINAL: No abdominal or epigastric pain. No nausea, vomiting, or hematemesis; No diarrhea or constipation. No melena or hematochezia.  GENITOURINARY: No dysuria, frequency, hematuria, or incontinence  NEUROLOGICAL: No headaches, memory loss, loss of strength, numbness, or tremors  SKIN: No itching, burning, rashes, or lesions   MUSCULOSKELETAL: No joint pain or swelling; No muscle, back, or extremity pain  PSYCHIATRIC: No depression, anxiety, mood swings, or difficulty sleeping      Medications:  azithromycin   Tablet 500 milliGRAM(s) Oral daily  cefepime   IVPB 2000 milliGRAM(s) IV Intermittent <User Schedule>    midodrine. 5 milliGRAM(s) Oral three times a day    albuterol    0.083% 2.5 milliGRAM(s) Nebulizer every 6 hours PRN  albuterol/ipratropium for Nebulization 3 milliLiter(s) Nebulizer every 4 hours  budesonide 160 MICROgram(s)/formoterol 4.5 MICROgram(s) Inhaler 2 Puff(s) Inhalation two times a day  guaiFENesin ER 1200 milliGRAM(s) Oral every 12 hours  tiotropium 2.5 MICROgram(s) Inhaler 2 Puff(s) Inhalation daily    acetaminophen     Tablet .. 650 milliGRAM(s) Oral every 6 hours PRN  gabapentin 300 milliGRAM(s) Oral <User Schedule>  melatonin 3 milliGRAM(s) Oral at bedtime PRN  ondansetron Injectable 4 milliGRAM(s) IV Push every 8 hours PRN  sertraline 100 milliGRAM(s) Oral daily      aspirin  chewable 81 milliGRAM(s) Oral daily    aluminum hydroxide/magnesium hydroxide/simethicone Suspension 30 milliLiter(s) Oral every 4 hours PRN  bisacodyl Suppository 10 milliGRAM(s) Rectal daily PRN  famotidine    Tablet 20 milliGRAM(s) Oral <User Schedule>  lactulose Syrup 20 Gram(s) Oral two times a day PRN  polyethylene glycol 3350 17 Gram(s) Oral daily  senna 2 Tablet(s) Oral at bedtime      atorvastatin 80 milliGRAM(s) Oral at bedtime  dextrose 50% Injectable 25 Gram(s) IV Push once  dextrose 50% Injectable 12.5 Gram(s) IV Push once  dextrose 50% Injectable 25 Gram(s) IV Push once  dextrose Oral Gel 15 Gram(s) Oral once PRN  glucagon  Injectable 1 milliGRAM(s) IntraMuscular once  insulin lispro (ADMELOG) corrective regimen sliding scale   SubCutaneous three times a day before meals  insulin lispro (ADMELOG) corrective regimen sliding scale   SubCutaneous at bedtime  levothyroxine 75 MICROGram(s) Oral daily  methylPREDNISolone sodium succinate Injectable 40 milliGRAM(s) IV Push every 8 hours    dextrose 5%. 1000 milliLiter(s) IV Continuous <Continuous>  dextrose 5%. 1000 milliLiter(s) IV Continuous <Continuous>      artificial  tears Solution 1 Drop(s) Both EYES two times a day            ICU Vital Signs Last 24 Hrs  T(C): 36.8 (04 Apr 2023 16:50), Max: 37.9 (04 Apr 2023 11:30)  T(F): 98.2 (04 Apr 2023 16:50), Max: 100.3 (04 Apr 2023 11:30)  HR: 82 (04 Apr 2023 20:06) (64 - 95)  BP: 120/62 (04 Apr 2023 16:50) (93/42 - 131/71)  BP(mean): --  ABP: --  ABP(mean): --  RR: 20 (04 Apr 2023 13:14) (18 - 20)  SpO2: 98% (04 Apr 2023 20:06) (92% - 100%)    O2 Parameters below as of 04 Apr 2023 16:50  Patient On (Oxygen Delivery Method): BiPAP/CPAP            ABG - ( 04 Apr 2023 18:20 )  pH, Arterial: 7.20  pH, Blood: x     /  pCO2: 67    /  pO2: 84    / HCO3: 26    / Base Excess: -3.3  /  SaO2: 98.0                I&O's Detail    04 Apr 2023 07:01  -  04 Apr 2023 21:50  --------------------------------------------------------  IN:    IV PiggyBack: 50 mL    Other (mL): 500 mL  Total IN: 550 mL    OUT:    Other (mL): 2500 mL  Total OUT: 2500 mL    Total NET: -1950 mL            LABS:                        7.1    10.20 )-----------( 104      ( 04 Apr 2023 07:59 )             24.4     04-04    135  |  100  |  31<H>  ----------------------------<  111<H>  4.2   |  29  |  3.69<H>    Ca    8.3<L>      04 Apr 2023 07:59    TPro  6.4  /  Alb  2.6<L>  /  TBili  0.5  /  DBili  x   /  AST  9<L>  /  ALT  10<L>  /  AlkPhos  65  04-04          CAPILLARY BLOOD GLUCOSE      POCT Blood Glucose.: 136 mg/dL (04 Apr 2023 17:48)    PT/INR - ( 03 Apr 2023 11:15 )   PT: 12.9 sec;   INR: 1.11 ratio         PTT - ( 03 Apr 2023 11:15 )  PTT:30.4 sec    CULTURES:  Culture Results:   No growth to date. (04-03-23 @ 11:48)  Culture Results:   No growth to date. (04-03-23 @ 11:15)  Rapid RVP Result: NotDetec (04-03-23 @ 10:53)      Physical Examination:    General: Obese woman in no acute distress.  Alert, oriented, interactive, nonfocal, following simple commands and moving all extremities     HEENT: Pupils equal, reactive to light.  Symmetric.    PULM: Breathing comfortabley, good BS B/L with no Rales or Rhonchi, no significant sputum production    CVS: Regular rate and rhythm, no murmurs, rubs, or gallops    ABD: BS+ Soft, nondistended, nontender, no masses    EXT: No edema, nontender    SKIN: Warm and well perfused, no rashes noted.    RADIOLOGY:   < from: Xray Chest 1 View- PORTABLE-Urgent (Xray Chest 1 View- PORTABLE-Urgent .) (04.04.23 @ 12:52) >  INTERPRETATION:  AP erect chest on April 4, 2023 at 12:12 PM. Patient has   a type symptoms.    Heart possibly enlarged. Micra pacemaker again noted. Large right jugular   line remains.    On April 3 there were atelectatic infiltrates emanating off the joseph.    The present examination the right upper lobe atelectatic component has   improved. Other findings remain.    IMPRESSION: Some improvement of bilateral perihilar atelectatic   infiltrates.    --- End of Report ---    < end of copied text >  < from: CT Chest No Cont (04.03.23 @ 14:09) >  IMPRESSION: New consolidation is noted in the posterior segment of the   right upper lobe. Differential diagnostic consideration includes   pneumonia.    Increased left lower lobe atelectasis when compared to previous exam.    --- End of Report ---      < end of copied text >     Patient is a 67y old  Female who presents with a chief complaint of Weakness (04 Apr 2023 19:54)      BRIEF HOSPITAL COURSE: 67F with GIB, anemia, HTN, T2DM, AMADO, obesity, COPD (on 3L home O2), acute on chronic respiratory failure, CAD, HLD, pancreatitis, s/p Micra pacemaker, ESRD on HD MWF via L AVF placed 3 weeks ago who presented with acute onset weakness after refusing dialysis the Friday and Monday prior to presentation. She reported associated weakness, tiredness, productive cough, and SOB. Upon arrival to ED yesterday, patient was HDS with labs notable for Hgb 7.9 (baseline ~9), .9, PLT 94, Cr 5.91, glucose 175, albumin 2.7, COVID and RVP negative. CXR and CT Chest consistent with RUL PNA, 0.8 cm solid nodule in RLL unchanged from prior, trace b/l pleural effusions. S/p Vanc/Zosyn, now on empiric Azithro / Cefepime. Initially placed on BiPAP for acute hypercapnic respiratory failure with worsening respiratory acidosis (7.21/71/84/28) warranting MICU consult.         PAST MEDICAL & SURGICAL HISTORY:  HTN (hypertension)  Asthma with chronic obstructive pulmonary disease (COPD)  HLD (hyperlipidemia)  Diabetes  CHF (congestive heart failure)  Lower GI bleed  Anemia  AMADO (obstructive sleep apnea  Obesity  On home oxygen therapy  CAD (coronary artery disease)  Pancreatitis  ESRD on dialysis  Pacemaker  S/P cholecystectomy  AV fistula  History of ankle surgery          Review of Systems:  CONSTITUTIONAL: No fever, chills, or fatigue  EYES: No eye pain, visual disturbances, or discharge  ENMT:  No difficulty hearing, tinnitus, vertigo; No sinus or throat pain  NECK: No pain or stiffness  RESPIRATORY: +cough. No wheezing, chills or hemoptysis; No shortness of breath  CARDIOVASCULAR: No chest pain, palpitations, dizziness, or leg swelling  GASTROINTESTINAL: No abdominal or epigastric pain. No nausea, vomiting, or hematemesis; No diarrhea or constipation. No melena or hematochezia.  GENITOURINARY: No dysuria, frequency, hematuria, or incontinence  NEUROLOGICAL: No headaches, memory loss, loss of strength, numbness, or tremors  SKIN: No itching, burning, rashes, or lesions   MUSCULOSKELETAL: No joint pain or swelling; No muscle, back, or extremity pain  PSYCHIATRIC: No depression, anxiety, mood swings, or difficulty sleeping      Medications:  azithromycin   Tablet 500 milliGRAM(s) Oral daily  cefepime   IVPB 2000 milliGRAM(s) IV Intermittent <User Schedule>    midodrine. 5 milliGRAM(s) Oral three times a day    albuterol    0.083% 2.5 milliGRAM(s) Nebulizer every 6 hours PRN  albuterol/ipratropium for Nebulization 3 milliLiter(s) Nebulizer every 4 hours  budesonide 160 MICROgram(s)/formoterol 4.5 MICROgram(s) Inhaler 2 Puff(s) Inhalation two times a day  guaiFENesin ER 1200 milliGRAM(s) Oral every 12 hours  tiotropium 2.5 MICROgram(s) Inhaler 2 Puff(s) Inhalation daily    acetaminophen     Tablet .. 650 milliGRAM(s) Oral every 6 hours PRN  gabapentin 300 milliGRAM(s) Oral <User Schedule>  melatonin 3 milliGRAM(s) Oral at bedtime PRN  ondansetron Injectable 4 milliGRAM(s) IV Push every 8 hours PRN  sertraline 100 milliGRAM(s) Oral daily      aspirin  chewable 81 milliGRAM(s) Oral daily    aluminum hydroxide/magnesium hydroxide/simethicone Suspension 30 milliLiter(s) Oral every 4 hours PRN  bisacodyl Suppository 10 milliGRAM(s) Rectal daily PRN  famotidine    Tablet 20 milliGRAM(s) Oral <User Schedule>  lactulose Syrup 20 Gram(s) Oral two times a day PRN  polyethylene glycol 3350 17 Gram(s) Oral daily  senna 2 Tablet(s) Oral at bedtime      atorvastatin 80 milliGRAM(s) Oral at bedtime  dextrose 50% Injectable 25 Gram(s) IV Push once  dextrose 50% Injectable 12.5 Gram(s) IV Push once  dextrose 50% Injectable 25 Gram(s) IV Push once  dextrose Oral Gel 15 Gram(s) Oral once PRN  glucagon  Injectable 1 milliGRAM(s) IntraMuscular once  insulin lispro (ADMELOG) corrective regimen sliding scale   SubCutaneous three times a day before meals  insulin lispro (ADMELOG) corrective regimen sliding scale   SubCutaneous at bedtime  levothyroxine 75 MICROGram(s) Oral daily  methylPREDNISolone sodium succinate Injectable 40 milliGRAM(s) IV Push every 8 hours    dextrose 5%. 1000 milliLiter(s) IV Continuous <Continuous>  dextrose 5%. 1000 milliLiter(s) IV Continuous <Continuous>      artificial  tears Solution 1 Drop(s) Both EYES two times a day            ICU Vital Signs Last 24 Hrs  T(C): 36.8 (04 Apr 2023 16:50), Max: 37.9 (04 Apr 2023 11:30)  T(F): 98.2 (04 Apr 2023 16:50), Max: 100.3 (04 Apr 2023 11:30)  HR: 82 (04 Apr 2023 20:06) (64 - 95)  BP: 120/62 (04 Apr 2023 16:50) (93/42 - 131/71)  BP(mean): --  ABP: --  ABP(mean): --  RR: 20 (04 Apr 2023 13:14) (18 - 20)  SpO2: 98% (04 Apr 2023 20:06) (92% - 100%)    O2 Parameters below as of 04 Apr 2023 16:50  Patient On (Oxygen Delivery Method): BiPAP/CPAP            ABG - ( 04 Apr 2023 18:20 )  pH, Arterial: 7.20  pH, Blood: x     /  pCO2: 67    /  pO2: 84    / HCO3: 26    / Base Excess: -3.3  /  SaO2: 98.0                I&O's Detail    04 Apr 2023 07:01  -  04 Apr 2023 21:50  --------------------------------------------------------  IN:    IV PiggyBack: 50 mL    Other (mL): 500 mL  Total IN: 550 mL    OUT:    Other (mL): 2500 mL  Total OUT: 2500 mL    Total NET: -1950 mL            LABS:                        7.1    10.20 )-----------( 104      ( 04 Apr 2023 07:59 )             24.4     04-04    135  |  100  |  31<H>  ----------------------------<  111<H>  4.2   |  29  |  3.69<H>    Ca    8.3<L>      04 Apr 2023 07:59    TPro  6.4  /  Alb  2.6<L>  /  TBili  0.5  /  DBili  x   /  AST  9<L>  /  ALT  10<L>  /  AlkPhos  65  04-04          CAPILLARY BLOOD GLUCOSE      POCT Blood Glucose.: 136 mg/dL (04 Apr 2023 17:48)    PT/INR - ( 03 Apr 2023 11:15 )   PT: 12.9 sec;   INR: 1.11 ratio         PTT - ( 03 Apr 2023 11:15 )  PTT:30.4 sec    CULTURES:  Culture Results:   No growth to date. (04-03-23 @ 11:48)  Culture Results:   No growth to date. (04-03-23 @ 11:15)  Rapid RVP Result: NotDetec (04-03-23 @ 10:53)      Physical Examination:    General: Obese woman in no acute distress.  Alert, oriented, interactive, nonfocal, following simple commands and moving all extremities     HEENT: Pupils equal, reactive to light.  Symmetric.    PULM: Breathing comfortably on AVAPS, bibasilar rhonchi, no significant sputum production    CVS: Regular rate and rhythm, no murmurs, rubs, or gallops    ABD: BS+ Soft, nondistended, nontender, no masses    EXT: No edema, nontender    SKIN: Warm and well perfused, no rashes noted.    RADIOLOGY:   < from: Xray Chest 1 View- PORTABLE-Urgent (Xray Chest 1 View- PORTABLE-Urgent .) (04.04.23 @ 12:52) >  INTERPRETATION:  AP erect chest on April 4, 2023 at 12:12 PM. Patient has   a type symptoms.    Heart possibly enlarged. Micra pacemaker again noted. Large right jugular   line remains.    On April 3 there were atelectatic infiltrates emanating off the joseph.    The present examination the right upper lobe atelectatic component has   improved. Other findings remain.    IMPRESSION: Some improvement of bilateral perihilar atelectatic   infiltrates.    --- End of Report ---    < end of copied text >  < from: CT Chest No Cont (04.03.23 @ 14:09) >  IMPRESSION: New consolidation is noted in the posterior segment of the   right upper lobe. Differential diagnostic consideration includes   pneumonia.    Increased left lower lobe atelectasis when compared to previous exam.    --- End of Report ---      < end of copied text >

## 2023-04-04 NOTE — DIETITIAN INITIAL EVALUATION ADULT - ORAL INTAKE PTA/DIET HISTORY
Currently residing at [facility]. Unable to obtain information 2/2 appears lethargic/weak, falling in and out of sleep. W/ vomiting as per EMR.  Currently residing at [facility]. Unable to obtain information 2/2 appears lethargic/weak, falling in and out of sleep. W/ vomiting as per EMR and wears dentures.

## 2023-04-04 NOTE — PHYSICAL THERAPY INITIAL EVALUATION ADULT - MODALITIES TREATMENT COMMENTS
The Pt was received on 2SW in bed long sitting, cooperative, and willing to participate with PT. Pt. refusing to attempt sit to stand from the EOB/OOB, will attempt when appropriate. Pulse Ox w/ monitor, NC, and B SCDs. Pt responded well to functional mobility trng, and thera ex review. Returned Pt back into supine and adjusted for comfort. The Pt was in NAD at end of tx.  Call bell, tray, phone in place and within reach. NSG made aware of session.

## 2023-04-04 NOTE — DIETITIAN INITIAL EVALUATION ADULT - NUTRITIONGOAL OUTCOME1
Advance diet as soon as medically feasible ; optimal nutrition meeting > 80% of ENN via tolerated route

## 2023-04-04 NOTE — CONSULT NOTE ADULT - ASSESSMENT
67F with GIB, anemia, HTN, T2DM, AMADO, obesity, COPD (on 3L home O2), acute on chronic respiratory failure, CAD, HLD, pancreatitis, s/p Micra pacemaker, ESRD on HD MWF via L AVF placed 3 weeks ago who presented with acute onset weakness after refusing dialysis the Friday and Monday prior to presentation. She reported associated weakness, tiredness, productive cough, and SOB. Upon arrival to ED yesterday, patient was HDS with labs notable for Hgb 7.9 (baseline ~9), .9, PLT 94, Cr 5.91, glucose 175, albumin 2.7, COVID and RVP negative. CXR and CT Chest consistent with RUL PNA, 0.8 cm solid nodule in RLL unchanged from prior, trace b/l pleural effusions. S/p Vanc/Zosyn, now on empiric Azithro / Cefepime. Initially placed on BiPAP for acute hypercapnic respiratory failure with worsening respiratory acidosis (7.21/71/84/28) warranting MICU consult.    Worsening respiratory acidosis  Acute Hypoxic / Hypercapnic Respiratory Failure  RUL PNA  Likely confounded by pre-existing COPD, AMADO / OHS, missed HD sessions for ESRD contributing to further volume overload and worsening bilateral pleural effusions    Plan:  - Patient was transitioned to AVAPS from BiPAP by pulmonology at change of shift this evening after ABG of 7.21/71/84/28 resulted.   - At bedside, patient's mentation intact / improved as per RN. She reports resolution of her respiratory distress. Continue AVAPS overnight.  - TVs only low-mid 300s on AVAPS 30/15/8/400/40%. Increased IPAP to 35 with improvement in TVs to mid 400s in attempt to better address ongoing hypercapnia. F/U repeat ABG in AM.  - Continue empiric antibiotics with Azithro and Cefepime. F/U pending cxs, narrow abx when appropriate.  - Continue duonebs q4h, inhalers (spiriva, albuterol, symbicort), and solumedrol q8h to assist in bronchodilation and reduction of inflammation.  - S/p HD today. Continue HD as scheduled to prevent further volume overload.  - Rest of care per primary team.    Dispo: Patient's respiratory status and mentation have clinically improved on AVAPS. Patient does not require upgrade to the stepdown or ICU at this time. Please reconsult in event of acute decompensation.    Case discussed with eICU Attending, Dr Arthur.

## 2023-04-04 NOTE — DIETITIAN INITIAL EVALUATION ADULT - PERTINENT MEDS FT
MEDICATIONS  (STANDING):  albuterol/ipratropium for Nebulization 3 milliLiter(s) Nebulizer every 4 hours  artificial  tears Solution 1 Drop(s) Both EYES two times a day  aspirin  chewable 81 milliGRAM(s) Oral daily  atorvastatin 80 milliGRAM(s) Oral at bedtime  budesonide 160 MICROgram(s)/formoterol 4.5 MICROgram(s) Inhaler 2 Puff(s) Inhalation two times a day  cefepime   IVPB 2000 milliGRAM(s) IV Intermittent <User Schedule>  cefepime  Injectable. 1000 milliGRAM(s) IV Push <User Schedule>  dextrose 5%. 1000 milliLiter(s) (50 mL/Hr) IV Continuous <Continuous>  dextrose 5%. 1000 milliLiter(s) (100 mL/Hr) IV Continuous <Continuous>  dextrose 50% Injectable 25 Gram(s) IV Push once  dextrose 50% Injectable 12.5 Gram(s) IV Push once  dextrose 50% Injectable 25 Gram(s) IV Push once  famotidine    Tablet 20 milliGRAM(s) Oral <User Schedule>  gabapentin 300 milliGRAM(s) Oral <User Schedule>  glucagon  Injectable 1 milliGRAM(s) IntraMuscular once  guaiFENesin ER 1200 milliGRAM(s) Oral every 12 hours  insulin lispro (ADMELOG) corrective regimen sliding scale   SubCutaneous three times a day before meals  insulin lispro (ADMELOG) corrective regimen sliding scale   SubCutaneous at bedtime  levothyroxine 75 MICROGram(s) Oral daily  midodrine. 5 milliGRAM(s) Oral three times a day  polyethylene glycol 3350 17 Gram(s) Oral daily  senna 2 Tablet(s) Oral at bedtime  sertraline 100 milliGRAM(s) Oral daily    MEDICATIONS  (PRN):  acetaminophen     Tablet .. 650 milliGRAM(s) Oral every 6 hours PRN Temp greater or equal to 38C (100.4F), Mild Pain (1 - 3)  albuterol    0.083% 2.5 milliGRAM(s) Nebulizer every 6 hours PRN Shortness of Breath and/or Wheezing  aluminum hydroxide/magnesium hydroxide/simethicone Suspension 30 milliLiter(s) Oral every 4 hours PRN Dyspepsia  bisacodyl Suppository 10 milliGRAM(s) Rectal daily PRN Constipation  dextrose Oral Gel 15 Gram(s) Oral once PRN Blood Glucose LESS THAN 70 milliGRAM(s)/deciliter  lactulose Syrup 20 Gram(s) Oral two times a day PRN constipation  melatonin 3 milliGRAM(s) Oral at bedtime PRN Insomnia  ondansetron Injectable 4 milliGRAM(s) IV Push every 8 hours PRN Nausea and/or Vomiting

## 2023-04-04 NOTE — PHYSICAL THERAPY INITIAL EVALUATION ADULT - ADDITIONAL COMMENTS
Pt. described living in a home with 12 JAMES, R HR, with daughter. Utilized RW for ambulation and w/c for mobility as needed. Most recently, came from [facility] and needed assist with ADLs and mobility with use of RW as well.

## 2023-04-04 NOTE — CONSULT NOTE ADULT - SUBJECTIVE AND OBJECTIVE BOX
HPI:    66 y/o F with PMH GI bleed, anemia, HTN, Type 2 DM, AMADO, obesity, COPD (on 3 L home O2), acute on chronic respiratory failure, CAD, HLD, pancreatitis, s/p Micra pacemaker, ESRD dialysis (M/W/F), s/p AV fistula placement questionable communication deficits at baseline? presented with weakness. She did not feel well the past few days and refused dialysis Friday and today. Reports feeling weak, tired, cough with mucus production, shortness of breath, vomiting. At the time of my evaluation the pt was unable to provide some of her history. As per daughterBel, that her mother has not been acting like herself. She seems more confused than usual (normally she is A+Ox3) and more fatigued. This has happened in the past when her Hb has been low. She had a colonoscopy within the last 1 year which was negative. She used to get Procrit injections but has not been getting any recently. Additionally, she has been having chest congestion, shortness of breath, and cramping in her hands. The pt's daughter stated the pt has AMADO but she has not been wearing CPAP at [facility]. The pt had an AV fistula placed 3 weeks ago in the left arm. Prior admission: - 12/8/22: lower GI bleed likely diverticular s/p 2 units of PRBCs (Hb 7.1 at that time), treated for HCAP, In ER course: VSS. Labs: Hb 7.9 (baseline ~9), .9, PLT 94, Cr 5.91, glucose 175, albumin 2.7, COVID and RVP negative. CXR: central venous catheter in SVC, micra pacemaker, RUL PNA, CT chest: New consolidation is noted in the posterior segment of the right upper lobe. Differential diagnostic consideration includes pneumonia. 0.8 cm solid nodule in RLL unchanged from prior. Trace b/l effusions. Chronic pancreatitis. Increased left lower lobe atelectasis when compared to previous exam. Pt was given Zoysn, Vancomycin. pat seen for pulmonary on bipap-ABG - ( 04 Apr 2023 11:03 ) pH, Arterial: 7.21  pH, Blood: x     /  pCO2: 71    /  pO2: 84    / HCO3: 28    / Base Excess: -1.3  /  SaO2: 97        PAST MEDICAL & SURGICAL HISTORY:  HTN (hypertension)      Asthma with chronic obstructive pulmonary disease (COPD)      HLD (hyperlipidemia)      Diabetes      CHF (congestive heart failure)      Lower GI bleed      Anemia      AMADO (obstructive sleep apnea)      Obesity      On home oxygen therapy      CAD (coronary artery disease)      Pancreatitis      ESRD on dialysis      Pacemaker      S/P cholecystectomy      AV fistula      History of ankle surgery          Home Medications:  Albuterol (Eqv-ProAir HFA) 90 mcg/inh inhalation aerosol: 2 puff(s) inhaled every 12 hours as needed for  shortness of breath and/or wheezing (03 Apr 2023 15:12)  Artificial Tears ophthalmic solution: 1 drop(s) in each eye 2 times a day (03 Apr 2023 15:12)  aspirin 81 mg oral tablet, chewable: 1 tab(s) orally once a day (03 Apr 2023 10:55)  Colace 100 mg oral capsule: 2 cap(s) orally once a day (at bedtime), HOLD for diarrhea (03 Apr 2023 10:55)  famotidine 20 mg oral tablet: 1 tab(s) orally Monday, Wednesday, and Friday after dialysis  (03 Apr 2023 10:55)  gabapentin 300 mg oral capsule: 1 cap(s) orally Monday, Wednesday, and Friday at bedtime  (03 Apr 2023 10:55)  HumaLOG 100 units/mL subcutaneous solution: subcutaneous 3 times a day (before meals) per sliding scale:  151-200 = 1 units  201-250 = 2 units  251-300 = 3 units  301-350 = 4 units  351-400 = 6 units  &gt;400 = call MD   (03 Apr 2023 11:07)  Lantus Solostar Pen 100 units/mL subcutaneous solution: 7 unit(s) subcutaneous once a day (at bedtime) (03 Apr 2023 10:55)  levothyroxine 75 mcg (0.075 mg) oral tablet: 1 tab(s) orally once a day (03 Apr 2023 10:55)  midodrine 5 mg oral tablet: 1 tab(s) orally 3 times a day ***hold if SBP &gt; 115*** (03 Apr 2023 15:12)  Robitussin Head and Chest Congestion 100 mg-5 mg/5 mL oral liquid: 10 milliliter(s) orally 3 times a day (03 Apr 2023 15:12)  rosuvastatin 20 mg oral tablet: 1 tab(s) orally once a day (in the evening) (03 Apr 2023 10:55)  Senna 8.6 mg oral tablet: 2 tab(s) orally once a day (at bedtime), hold for Diarrhea (03 Apr 2023 10:55)  sertraline 100 mg oral tablet: 1 tab(s) orally once a day (03 Apr 2023 10:55)  Symbicort 160 mcg-4.5 mcg/inh inhalation aerosol: 2 puff(s) inhaled 2 times a day (03 Apr 2023 10:55)  tiotropium 2.5 mcg/inh inhalation aerosol: 2 puff(s) inhaled once a day (03 Apr 2023 10:55)  Tylenol Extra Strength 500 mg oral tablet: 1 tab(s) orally every 6 hours, As Needed (03 Apr 2023 10:55)      MEDICATIONS  (STANDING):  albuterol/ipratropium for Nebulization 3 milliLiter(s) Nebulizer every 4 hours  artificial  tears Solution 1 Drop(s) Both EYES two times a day  aspirin  chewable 81 milliGRAM(s) Oral daily  atorvastatin 80 milliGRAM(s) Oral at bedtime  budesonide 160 MICROgram(s)/formoterol 4.5 MICROgram(s) Inhaler 2 Puff(s) Inhalation two times a day  cefepime   IVPB 2000 milliGRAM(s) IV Intermittent <User Schedule>  cefepime  Injectable. 1000 milliGRAM(s) IV Push <User Schedule>  dextrose 5%. 1000 milliLiter(s) (100 mL/Hr) IV Continuous <Continuous>  dextrose 5%. 1000 milliLiter(s) (50 mL/Hr) IV Continuous <Continuous>  dextrose 50% Injectable 25 Gram(s) IV Push once  dextrose 50% Injectable 12.5 Gram(s) IV Push once  dextrose 50% Injectable 25 Gram(s) IV Push once  famotidine    Tablet 20 milliGRAM(s) Oral <User Schedule>  gabapentin 300 milliGRAM(s) Oral <User Schedule>  glucagon  Injectable 1 milliGRAM(s) IntraMuscular once  guaiFENesin ER 1200 milliGRAM(s) Oral every 12 hours  insulin lispro (ADMELOG) corrective regimen sliding scale   SubCutaneous three times a day before meals  insulin lispro (ADMELOG) corrective regimen sliding scale   SubCutaneous at bedtime  levothyroxine 75 MICROGram(s) Oral daily  methylPREDNISolone sodium succinate Injectable 40 milliGRAM(s) IV Push every 8 hours  midodrine. 5 milliGRAM(s) Oral three times a day  polyethylene glycol 3350 17 Gram(s) Oral daily  senna 2 Tablet(s) Oral at bedtime  sertraline 100 milliGRAM(s) Oral daily  tiotropium 2.5 MICROgram(s) Inhaler 2 Puff(s) Inhalation daily    MEDICATIONS  (PRN):  acetaminophen     Tablet .. 650 milliGRAM(s) Oral every 6 hours PRN Temp greater or equal to 38C (100.4F), Mild Pain (1 - 3)  acetaminophen     Tablet .. 650 milliGRAM(s) Oral every 6 hours PRN Moderate Pain (4 - 6)  albuterol    0.083% 2.5 milliGRAM(s) Nebulizer every 6 hours PRN Shortness of Breath and/or Wheezing  aluminum hydroxide/magnesium hydroxide/simethicone Suspension 30 milliLiter(s) Oral every 4 hours PRN Dyspepsia  bisacodyl Suppository 10 milliGRAM(s) Rectal daily PRN Constipation  dextrose Oral Gel 15 Gram(s) Oral once PRN Blood Glucose LESS THAN 70 milliGRAM(s)/deciliter  lactulose Syrup 20 Gram(s) Oral two times a day PRN constipation  melatonin 3 milliGRAM(s) Oral at bedtime PRN Insomnia  ondansetron Injectable 4 milliGRAM(s) IV Push every 8 hours PRN Nausea and/or Vomiting      Allergies    Troup (Unknown)  No Known Drug Allergies    Intolerances        SOCIAL HISTORY: Denies tobacco, etoh abuse or illicit drug use    FAMILY HISTORY:  FH: diabetes mellitus (Mother)    Family history of CKD (chronic kidney disease) (Mother)        Vital Signs Last 24 Hrs  T(C): 37.9 (04 Apr 2023 11:30), Max: 37.9 (04 Apr 2023 11:30)  T(F): 100.3 (04 Apr 2023 11:30), Max: 100.3 (04 Apr 2023 11:30)  HR: 89 (04 Apr 2023 13:16) (63 - 95)  BP: 112/58 (04 Apr 2023 13:16) (93/42 - 171/90)  BP(mean): --  RR: 20 (04 Apr 2023 13:14) (16 - 20)  SpO2: 96% (04 Apr 2023 13:14) (92% - 100%)    Parameters below as of 04 Apr 2023 13:16  Patient On (Oxygen Delivery Method): BiPAP/CPAP            REVIEW OF SYSTEMS:    CONSTITUTIONAL:  As per HPI.  HEENT:  Eyes:  No diplopia or blurred vision. ENT:  No earache, sore throat or runny nose.  CARDIOVASCULAR:  No pressure, squeezing, tightness, heaviness or aching about the chest, neck, axilla or epigastrium.  RESPIRATORY:  No cough, shortness of breath, PND or orthopnea.  GASTROINTESTINAL:  No nausea, vomiting or diarrhea.  GENITOURINARY:  No dysuria, frequency or urgency.  MUSCULOSKELETAL:  As per HPI.  SKIN:  No change in skin, hair or nails.  NEUROLOGIC:  No paresthesias, fasciculations, seizures or weakness.  PSYCHIATRIC:  No disorder of thought or mood.  ENDOCRINE:  No heat or cold intolerance, polyuria or polydipsia.  HEMATOLOGICAL:  No easy bruising or bleedings:  .     PHYSICAL EXAMINATION:    GENERAL APPEARANCE:  Pt. is not currently dyspneic, in no distress. Pt. is alert, oriented, and pleasant.  HEENT:  Pupils are normal and react normally. No icterus. Mucous membranes well colored.  NECK:  Supple. No lymphadenopathy. Jugular venous pressure not elevated. Carotids equal.   HEART:   The cardiac impulse has a normal quality. Regular. Normal S1 and S2. There are no murmurs, rubs or gallops noted  CHEST:  Chest is clear to auscultation. Normal respiratory effort.  ABDOMEN:  Soft and nontender.   EXTREMITIES:  There is no cyanosis, clubbing or edema.   SKIN:  No rash or significant lesions are noted.    LABS:                        7.1    10.20 )-----------( 104      ( 04 Apr 2023 07:59 )             24.4     04-04    135  |  100  |  31<H>  ----------------------------<  111<H>  4.2   |  29  |  3.69<H>    Ca    8.3<L>      04 Apr 2023 07:59    TPro  6.4  /  Alb  2.6<L>  /  TBili  0.5  /  DBili  x   /  AST  9<L>  /  ALT  10<L>  /  AlkPhos  65  04-04    LIVER FUNCTIONS - ( 04 Apr 2023 07:59 )  Alb: 2.6 g/dL / Pro: 6.4 gm/dL / ALK PHOS: 65 U/L / ALT: 10 U/L / AST: 9 U/L / GGT: x           PT/INR - ( 03 Apr 2023 11:15 )   PT: 12.9 sec;   INR: 1.11 ratio         PTT - ( 03 Apr 2023 11:15 )  PTT:30.4 sec        ABG - ( 04 Apr 2023 11:03 )  pH, Arterial: 7.21  pH, Blood: x     /  pCO2: 71    /  pO2: 84    / HCO3: 28    / Base Excess: -1.3  /  SaO2: 97        RADIOLOGY & ADDITIONAL STUDIES:     Xray Chest 1 View- PORTABLE-Urgent (Xray Chest 1 View- PORTABLE-Urgent .) (04.04.23 @ 12:52) >  IMPRESSION: Some improvement of bilateral perihilar atelectatic   infiltrates.    CT Chest No Cont (04.03.23 @ 14:09) >  IMPRESSION: New consolidation is noted in the posterior segment of the   right upper lobe. Differential diagnostic consideration includes   pneumonia.    Increased left lower lobe atelectasis when compared to previous exam.      CT Abdomen and Pelvis No Cont (04.03.23 @ 21:15) >  IMPRESSION:    1. Basilar lung opacities, question pneumonia and/or atelectasis.  2. Rectum distended by stool with mild wall thickening and inflammation,   compatible with stercoral colitis.

## 2023-04-04 NOTE — CONSULT NOTE ADULT - ASSESSMENT
66 y/o F presented with weakness     PROBLEMS:    AC hypoxaemic & hypercapneic Respiratory failurew  Multifactorial: RUL consolidation/HCAP  AMADO not on CPAP, likely obesity hypoventilation syndrome,  ESRD missed session of dialysis/increaesd pleural effusions and fluid overload  Macrocytic anemia   Acute metabolic encephalopathy likely secondary to infectious etiology (HCAP) vs. metabolic vs. neurologic   Vomiting possibly secondary to enteritis vs. GERD vs. uremia? 3. Thrombocytopenia   Hyperglycemia secondary to Type 2 DM  Hypoalbuminemia   History of GI bleed, anemia, HTN, Type 2 DM, AMADO, obesity, COPD (on 3 L home O2), acute on chronic respiratory failure, CAD, HLD, pancreatitis, s/p Micra pacemaker, ESRD dialysis (M/W/F), questionable communication deficits at baseline?    PLAN:    BIPAP 16/12-RR 15- fio2  S/p Vancomycin and Zosyn in the ER; c/w Cefepime   f/u UCx, BCx x 2, ABG, B12, folate  Sputum culture  S/p HD today, c/w M/W/F schedule   Hb 7.9, monitor H+H closely, occult negative on my exam -> sent a fecal occult as well -> may need Procrit injection  D/W hospitalist 68 y/o F presented with weakness     PROBLEMS:    AC hypoxaemic & hypercapneic Respiratory failurew  Multifactorial: RUL consolidation/HCAP  AMADO not on CPAP, likely obesity hypoventilation syndrome,  ESRD missed session of dialysis/increaesd pleural effusions and fluid overload  Macrocytic anemia   Acute metabolic encephalopathy likely secondary to infectious etiology (HCAP) vs. metabolic vs. neurologic   Vomiting possibly secondary to enteritis vs. GERD vs. uremia? 3. Thrombocytopenia   Hyperglycemia secondary to Type 2 DM  Hypoalbuminemia   History of GI bleed, anemia, HTN, Type 2 DM, AMADO, obesity, COPD (on 3 L home O2), acute on chronic respiratory failure, CAD, HLD, pancreatitis, s/p Micra pacemaker, ESRD dialysis (M/W/F), questionable communication deficits at baseline?    PLAN:    BIPAP 16/12-RR 15- fio2-ABG - ( 04 Apr 2023 18:20 )pH, Arterial: 7.20  pH, Blood: x     /  pCO2: 67    /  pO2: 84    / HCO3: 26    / Base Excess: -3.3  /  SaO2: 98.0    Change to AVAPS-30/15/8/400/40%-ABG in am  S/p Vancomycin and Zosyn in the ER; c/w Cefepime   f/u UCx, BCx x 2, ABG, B12, folate  Sputum culture  S/p HD today, c/w M/W/F schedule   Hb 7.9, monitor H+H closely, occult negative on my exam -> sent a fecal occult as well -> may need Procrit injection  D/W hospitalist/d/w staff  DVT prophylasix

## 2023-04-04 NOTE — PATIENT PROFILE ADULT - FALL HARM RISK - HARM RISK INTERVENTIONS

## 2023-04-04 NOTE — PHYSICAL THERAPY INITIAL EVALUATION ADULT - GENERAL OBSERVATIONS, REHAB EVAL
The pt was pleasant and cooperative, received on 2SW, in long sitting and eager to participate in PT evaluation. NC, Pulse Ox w/ monitor, and bilateral SCDs in place.

## 2023-04-04 NOTE — DIETITIAN INITIAL EVALUATION ADULT - OTHER INFO
66 y/o F with PMH GI bleed, anemia, HTN, Type 2 DM, AMADO, obesity, COPD (on 3 L home O2), acute on chronic respiratory failure, CAD, HLD, pancreatitis, s/p Micra pacemaker, ESRD dialysis (M/W/F), s/p AV fistula placement questionable communication deficits at baseline? presented with weakness.  Reports feeling weak, tired, cough with mucus production, shortness of breath, vomiting. At the time of my evaluation the pt was unable to provide some of her history. I spoke to her daughter, Bel, over the phone who stated that her mother has not been acting like herself. She seems more confused than usual (normally she is A+Ox3) and more fatigued. She used to get Procrit injections but has not been getting any recently. Additionally, she has been having chest congestion, shortness of breath, and cramping in her hands. The pt's daughter stated the pt has AMADO but she has not been wearing CPAP at [facility]. The pt had an AV fistula placed 3 weeks ago in the left arm.   Admit for generalized weakness likely multifactorial, acute metabolic encephalopathy likely secondary to infectious etiology (HCAP) vs. metabolic vs. neurologic     Unable to obtain diet / wt hx 2/2 AMS, appears lethargic/weak/unable to answer questions. W/ T2DM - no hgb A1C and POCTS (160, 143) x 24 hrs WNL. Is NPO x 1 day ; advance diet as tolerated to fulls/renal restricted diet & CHO consistent diet as soon as medically feasible. Bed scale wt of 203# taken by RD on 4/4/23. NFPE reveals no muscle/fat wasting at this time - pt appears obese; edema could be masking losses, skewing appearance (+2 l/r ankle edema). Advance diet as tolerated to fulls/renal restricted diet (pending phosphorus lab) and CHO consistent diet (pending A1C lab); if labs WNL - initiate regular diet. RD provided diabetes and renal hand-outs from [facility] at bed side; will f/u and provide nutrition education prn. See below for other recommendations.         66 y/o F with PMH GI bleed, anemia, HTN, Type 2 DM, AMADO, obesity, COPD (on 3 L home O2), acute on chronic respiratory failure, CAD, HLD, pancreatitis, s/p Micra pacemaker, ESRD dialysis (M/W/F), s/p AV fistula placement questionable communication deficits at baseline? presented with weakness.  Reports feeling weak, tired, cough with mucus production, shortness of breath, vomiting. At the time of my evaluation the pt was unable to provide some of her history. I spoke to her daughter, Bel, over the phone who stated that her mother has not been acting like herself. She seems more confused than usual (normally she is A+Ox3) and more fatigued. She used to get Procrit injections but has not been getting any recently. Additionally, she has been having chest congestion, shortness of breath, and cramping in her hands. The pt's daughter stated the pt has AMADO but she has not been wearing CPAP at [facility]. The pt had an AV fistula placed 3 weeks ago in the left arm.   Admit for generalized weakness likely multifactorial, acute metabolic encephalopathy likely secondary to infectious etiology (HCAP) vs. metabolic vs. neurologic     With venti mask. Unable to obtain diet / wt hx 2/2 AMS, appears lethargic/weak/unable to answer questions. W/ T2DM - no hgb A1C and POCTS (160, 143) x 24 hrs WNL. Is NPO x 1 day ; advance diet as tolerated to fulls/renal restricted diet & CHO consistent diet as soon as medically feasible. Bed scale wt of 203# taken by RD on 4/4/23. NFPE reveals no muscle/fat wasting at this time - pt appears obese; edema could be masking losses, skewing appearance (+2 l/r ankle edema). Advance diet as tolerated to fulls/renal restricted diet (pending phosphorus lab) and CHO consistent diet (pending A1C lab); if labs WNL - initiate regular diet. RD provided diabetes and renal hand-outs from [facility] at bed side; will f/u and provide nutrition education prn. See below for other recommendations.

## 2023-04-04 NOTE — PROGRESS NOTE ADULT - ASSESSMENT
68 y/o F with PMH GI bleed, anemia, HTN, Type 2 DM, AMADO, obesity, COPD (on 3 L home O2), acute on chronic respiratory failure, CAD, HLD, pancreatitis, s/p Micra pacemaker, ESRD dialysis (M/W/F), s/p AV fistula placement questionable communication deficits at baseline? presented with weakness. The pt stated that she did not feel well the past few days and refused dialysis Friday and today. Reports feeling weak, tired, cough with mucus production, shortness of breath, vomiting. At the time of my evaluation the pt was unable to provide some of her history. I spoke to her daughter, Bel, over the phone who stated that her mother has not been acting like herself. She seems more confused than usual (normally she is A+Ox3) and more fatigued. This has happened in the past when her Hb has been low. She had a colonoscopy within the last 1 year which was negative. She used to get Procrit injections but has not been getting any recently. Additionally, she has been having chest congestion, shortness of breath, and cramping in her hands. The pt's daughter stated the pt has AMADO but she has not been wearing CPAP at [facility]. The pt had an AV fistula placed 3 weeks ago in the left arm.     #Acute Hypoxic/Hypercapnic Respiratory Failure,  RUL consolidation, likely PNA, Metabolic Encephalopathy   -CT head: negative   -CT chest, abdomen/pelvis: as above   -Cefepime + Azithro  -Start Solumedrol   -1x dose IV Mg+  -Spiriva, Symbicort, Nebs   -NIPPV  -f/u on cultures   -TSH and Vitamin B-12: normal   -aspiration precautions   -pulmonary consult and recommendations noted  -ID consult and recommendations noted       #Stercoral Colitis   -aggressive bowel regimen     #Anemia  -occult negative   -f/u on fecal occult   -on procrit     #ESRD  -on HD   -nephrology following    #VTE Prophylaxis  -SCDs      Patient was more lethargic in AM. More alert after the BiPAP settings were changed. c/w BiPAP for now. Monitor clinicallly, if worsening will need to upgrade. ICU aware. Will monitor on 2SW for now

## 2023-04-04 NOTE — DIETITIAN INITIAL EVALUATION ADULT - REASON FOR ADMISSION
Patients mother was in the office and just questioned if the patient is going to have to have labs done before his next appointment (CPX 7/5/18) and if he does if they would be the fasting labs.    Pneumonia due to infectious organism

## 2023-04-04 NOTE — PHYSICAL THERAPY INITIAL EVALUATION ADULT - PERTINENT HX OF CURRENT PROBLEM, REHAB EVAL
66 y/o F with PMH GI bleed, anemia, HTN, Type 2 DM, AMADO, obesity, COPD (on 3 L home O2), acute on chronic respiratory failure, CAD, HLD, pancreatitis, s/p Micra pacemaker, ESRD dialysis (M/W/F), s/p AV fistula placement with complaints of weakness.     Had refused two dialysis sessions prior to hospitalization. Daughter stating that Pt was noted to be more confused and fatigued as well.    RUL PNA

## 2023-04-04 NOTE — PROVIDER CONTACT NOTE (EICU) - ACTION/TREATMENT ORDERED:
continue AVAPS  Vancomycin, Zosyn  Dialysis as needed  reconsult if condition worsens.  f/u cultures

## 2023-04-04 NOTE — PROVIDER CONTACT NOTE (EICU) - SITUATION
67F with GIB, anemia, HTN, T2DM, AMADO, obesity, COPD (on 3L home O2), acute on chronic respiratory failure, CAD, HLD, pancreatitis, s/p Micra pacemaker, ESRD on HD MWF via L AVF placed 3 weeks ago who presented with acute onset weakness after refusing dialysis the Friday and Monday prior to presentation. She reported associated weakness, tiredness, productive cough, and SOB. Upon arrival to ED yesterday, patient was HDS with labs notable for Hgb 7.9 (baseline ~9), .9, PLT 94, Cr 5.91, glucose 175, albumin 2.7, COVID and RVP negative. CXR and CT Chest consistent with RUL PNA, 0.8 cm solid nodule in RLL unchanged from prior, trace b/l pleural effusions.  Case discussed with the ICU PA

## 2023-04-05 LAB
ACETONE SERPL-MCNC: ABNORMAL
ANION GAP SERPL CALC-SCNC: 14 MMOL/L — SIGNIFICANT CHANGE UP (ref 5–17)
BASE EXCESS BLDA CALC-SCNC: -9.5 MMOL/L — LOW (ref -2–3)
BUN SERPL-MCNC: 56 MG/DL — HIGH (ref 7–23)
CALCIUM SERPL-MCNC: 8.9 MG/DL — SIGNIFICANT CHANGE UP (ref 8.5–10.1)
CHLORIDE SERPL-SCNC: 97 MMOL/L — SIGNIFICANT CHANGE UP (ref 96–108)
CO2 BLDA-SCNC: 21 MMOL/L — SIGNIFICANT CHANGE UP (ref 19–24)
CO2 SERPL-SCNC: 20 MMOL/L — LOW (ref 22–31)
CREAT SERPL-MCNC: 4.9 MG/DL — HIGH (ref 0.5–1.3)
EGFR: 9 ML/MIN/1.73M2 — LOW
GAS PNL BLDA: SIGNIFICANT CHANGE UP
GLUCOSE BLDC GLUCOMTR-MCNC: 217 MG/DL — HIGH (ref 70–99)
GLUCOSE BLDC GLUCOMTR-MCNC: 260 MG/DL — HIGH (ref 70–99)
GLUCOSE BLDC GLUCOMTR-MCNC: 275 MG/DL — HIGH (ref 70–99)
GLUCOSE BLDC GLUCOMTR-MCNC: 85 MG/DL — SIGNIFICANT CHANGE UP (ref 70–99)
GLUCOSE SERPL-MCNC: 256 MG/DL — HIGH (ref 70–99)
HCO3 BLDA-SCNC: 19 MMOL/L — LOW (ref 21–28)
HCT VFR BLD CALC: 26.4 % — LOW (ref 34.5–45)
HGB BLD-MCNC: 7.7 G/DL — LOW (ref 11.5–15.5)
LACTATE SERPL-SCNC: 1.5 MMOL/L — SIGNIFICANT CHANGE UP (ref 0.7–2)
MCHC RBC-ENTMCNC: 29.2 GM/DL — LOW (ref 32–36)
MCHC RBC-ENTMCNC: 31 PG — SIGNIFICANT CHANGE UP (ref 27–34)
MCV RBC AUTO: 106.5 FL — HIGH (ref 80–100)
PCO2 BLDA: 53 MMHG — HIGH (ref 32–45)
PH BLDA: 7.17 — CRITICAL LOW (ref 7.35–7.45)
PLATELET # BLD AUTO: 138 K/UL — LOW (ref 150–400)
PO2 BLDA: 84 MMHG — SIGNIFICANT CHANGE UP (ref 83–108)
POTASSIUM SERPL-MCNC: 5.5 MMOL/L — HIGH (ref 3.5–5.3)
POTASSIUM SERPL-SCNC: 5.5 MMOL/L — HIGH (ref 3.5–5.3)
RBC # BLD: 2.48 M/UL — LOW (ref 3.8–5.2)
RBC # FLD: 16.5 % — HIGH (ref 10.3–14.5)
SAO2 % BLDA: 98 % — SIGNIFICANT CHANGE UP (ref 94–98)
SODIUM SERPL-SCNC: 131 MMOL/L — LOW (ref 135–145)
WBC # BLD: 8.98 K/UL — SIGNIFICANT CHANGE UP (ref 3.8–10.5)
WBC # FLD AUTO: 8.98 K/UL — SIGNIFICANT CHANGE UP (ref 3.8–10.5)

## 2023-04-05 PROCEDURE — 99233 SBSQ HOSP IP/OBS HIGH 50: CPT

## 2023-04-05 RX ORDER — ACETAMINOPHEN 500 MG
650 TABLET ORAL ONCE
Refills: 0 | Status: COMPLETED | OUTPATIENT
Start: 2023-04-05 | End: 2023-04-05

## 2023-04-05 RX ORDER — ACETAMINOPHEN 500 MG
650 TABLET ORAL EVERY 6 HOURS
Refills: 0 | Status: DISCONTINUED | OUTPATIENT
Start: 2023-04-05 | End: 2023-04-11

## 2023-04-05 RX ADMIN — Medication 1 DROP(S): at 10:49

## 2023-04-05 RX ADMIN — GABAPENTIN 300 MILLIGRAM(S): 400 CAPSULE ORAL at 21:13

## 2023-04-05 RX ADMIN — FAMOTIDINE 20 MILLIGRAM(S): 10 INJECTION INTRAVENOUS at 10:42

## 2023-04-05 RX ADMIN — SERTRALINE 100 MILLIGRAM(S): 25 TABLET, FILM COATED ORAL at 09:34

## 2023-04-05 RX ADMIN — Medication 81 MILLIGRAM(S): at 09:34

## 2023-04-05 RX ADMIN — Medication 3 MILLILITER(S): at 00:12

## 2023-04-05 RX ADMIN — BUDESONIDE AND FORMOTEROL FUMARATE DIHYDRATE 2 PUFF(S): 160; 4.5 AEROSOL RESPIRATORY (INHALATION) at 08:32

## 2023-04-05 RX ADMIN — BUDESONIDE AND FORMOTEROL FUMARATE DIHYDRATE 2 PUFF(S): 160; 4.5 AEROSOL RESPIRATORY (INHALATION) at 21:24

## 2023-04-05 RX ADMIN — Medication 40 MILLIGRAM(S): at 05:38

## 2023-04-05 RX ADMIN — Medication 75 MICROGRAM(S): at 05:37

## 2023-04-05 RX ADMIN — Medication 650 MILLIGRAM(S): at 18:33

## 2023-04-05 RX ADMIN — MIDODRINE HYDROCHLORIDE 5 MILLIGRAM(S): 2.5 TABLET ORAL at 11:15

## 2023-04-05 RX ADMIN — Medication 40 MILLIGRAM(S): at 21:14

## 2023-04-05 RX ADMIN — Medication 1200 MILLIGRAM(S): at 21:13

## 2023-04-05 RX ADMIN — Medication 1 DROP(S): at 21:17

## 2023-04-05 RX ADMIN — CEFEPIME 100 MILLIGRAM(S): 1 INJECTION, POWDER, FOR SOLUTION INTRAMUSCULAR; INTRAVENOUS at 16:40

## 2023-04-05 RX ADMIN — Medication 3 MILLILITER(S): at 21:26

## 2023-04-05 RX ADMIN — Medication 650 MILLIGRAM(S): at 03:12

## 2023-04-05 RX ADMIN — AZITHROMYCIN 500 MILLIGRAM(S): 500 TABLET, FILM COATED ORAL at 10:42

## 2023-04-05 RX ADMIN — MIDODRINE HYDROCHLORIDE 5 MILLIGRAM(S): 2.5 TABLET ORAL at 05:37

## 2023-04-05 RX ADMIN — Medication 6: at 11:15

## 2023-04-05 RX ADMIN — Medication 3 MILLIGRAM(S): at 03:12

## 2023-04-05 RX ADMIN — Medication 3 MILLILITER(S): at 04:04

## 2023-04-05 RX ADMIN — Medication 6: at 08:18

## 2023-04-05 RX ADMIN — Medication 3 MILLILITER(S): at 08:35

## 2023-04-05 RX ADMIN — ATORVASTATIN CALCIUM 80 MILLIGRAM(S): 80 TABLET, FILM COATED ORAL at 21:13

## 2023-04-05 RX ADMIN — Medication 3 MILLILITER(S): at 17:35

## 2023-04-05 RX ADMIN — POLYETHYLENE GLYCOL 3350 17 GRAM(S): 17 POWDER, FOR SOLUTION ORAL at 10:43

## 2023-04-05 RX ADMIN — SENNA PLUS 2 TABLET(S): 8.6 TABLET ORAL at 21:13

## 2023-04-05 NOTE — PROGRESS NOTE ADULT - SUBJECTIVE AND OBJECTIVE BOX
Increase functional status to baseline Patient is a 67y Female who reports no complaints as new, seen at hd     MEDICATIONS  (STANDING):  albuterol/ipratropium for Nebulization 3 milliLiter(s) Nebulizer every 4 hours  artificial  tears Solution 1 Drop(s) Both EYES two times a day  aspirin  chewable 81 milliGRAM(s) Oral daily  atorvastatin 80 milliGRAM(s) Oral at bedtime  azithromycin   Tablet 500 milliGRAM(s) Oral daily  budesonide 160 MICROgram(s)/formoterol 4.5 MICROgram(s) Inhaler 2 Puff(s) Inhalation two times a day  cefepime   IVPB 2000 milliGRAM(s) IV Intermittent <User Schedule>  dextrose 5%. 1000 milliLiter(s) (100 mL/Hr) IV Continuous <Continuous>  dextrose 5%. 1000 milliLiter(s) (50 mL/Hr) IV Continuous <Continuous>  dextrose 50% Injectable 25 Gram(s) IV Push once  dextrose 50% Injectable 12.5 Gram(s) IV Push once  dextrose 50% Injectable 25 Gram(s) IV Push once  famotidine    Tablet 20 milliGRAM(s) Oral <User Schedule>  gabapentin 300 milliGRAM(s) Oral <User Schedule>  glucagon  Injectable 1 milliGRAM(s) IntraMuscular once  guaiFENesin ER 1200 milliGRAM(s) Oral every 12 hours  insulin lispro (ADMELOG) corrective regimen sliding scale   SubCutaneous three times a day before meals  insulin lispro (ADMELOG) corrective regimen sliding scale   SubCutaneous at bedtime  levothyroxine 75 MICROGram(s) Oral daily  methylPREDNISolone sodium succinate Injectable 40 milliGRAM(s) IV Push every 8 hours  midodrine. 5 milliGRAM(s) Oral three times a day  polyethylene glycol 3350 17 Gram(s) Oral daily  senna 2 Tablet(s) Oral at bedtime  sertraline 100 milliGRAM(s) Oral daily  tiotropium 2.5 MICROgram(s) Inhaler 2 Puff(s) Inhalation daily    MEDICATIONS  (PRN):  albuterol    0.083% 2.5 milliGRAM(s) Nebulizer every 6 hours PRN Shortness of Breath and/or Wheezing  aluminum hydroxide/magnesium hydroxide/simethicone Suspension 30 milliLiter(s) Oral every 4 hours PRN Dyspepsia  bisacodyl Suppository 10 milliGRAM(s) Rectal daily PRN Constipation  dextrose Oral Gel 15 Gram(s) Oral once PRN Blood Glucose LESS THAN 70 milliGRAM(s)/deciliter  lactulose Syrup 20 Gram(s) Oral two times a day PRN constipation  melatonin 3 milliGRAM(s) Oral at bedtime PRN Insomnia  ondansetron Injectable 4 milliGRAM(s) IV Push every 8 hours PRN Nausea and/or Vomiting        T(C): , Max: 37.7 (04-04-23 @ 13:16)  T(F): , Max: 99.8 (04-04-23 @ 13:16)  HR: 88 (04-05-23 @ 07:53)  BP: 114/55 (04-05-23 @ 07:53)  BP(mean): --  RR: 16 (04-05-23 @ 07:53)  SpO2: 98% (04-05-23 @ 08:20)  Wt(kg): --    04-04 @ 07:01  -  04-05 @ 07:00  --------------------------------------------------------  IN: 550 mL / OUT: 2500 mL / NET: -1950 mL          PHYSICAL EXAM:    Constitutional: NAD, frail  HEENT:  MM  dist  Cardiovascular: S1 and S2   Gastrointestinal: soft  Extremities:   peripheral edema  Neurological: A/O x 3, no focal deficits  Psychiatric: Normal mood, normal affect  : No Thompson  Skin: No rashes        LABS:                        7.7    8.98  )-----------( 138      ( 05 Apr 2023 07:48 )             26.4     05 Apr 2023 07:48    131    |  97     |  56     ----------------------------<  256    5.5     |  20     |  4.90   04 Apr 2023 07:59    135    |  100    |  31     ----------------------------<  111    4.2     |  29     |  3.69   03 Apr 2023 21:25    x      |  x      |  x      ----------------------------<  149    x       |  x      |  x      03 Apr 2023 11:15    135    |  105    |  77     ----------------------------<  175    5.2     |  26     |  5.91     Ca    8.9        05 Apr 2023 07:48  Ca    8.3        04 Apr 2023 07:59  Ca    8.3        03 Apr 2023 11:15    TPro  6.4    /  Alb  2.6    /  TBili  0.5    /  DBili  x      /  AST  9      /  ALT  10     /  AlkPhos  65     04 Apr 2023 07:59  TPro  6.7    /  Alb  2.7    /  TBili  0.4    /  DBili  x      /  AST  16     /  ALT  12     /  AlkPhos  69     03 Apr 2023 11:15          Urine Studies:          RADIOLOGY & ADDITIONAL STUDIES:

## 2023-04-05 NOTE — PROGRESS NOTE ADULT - ASSESSMENT
68 y/o F presented with weakness     PROBLEMS:    AC hypoxaemic & hypercapneic Respiratory failurew  Multifactorial: RUL consolidation/HCAP  AMADO not on CPAP, likely obesity hypoventilation syndrome,  ESRD missed session of dialysis/increaesd pleural effusions and fluid overload  Macrocytic anemia   Acute metabolic encephalopathy likely secondary to infectious etiology (HCAP) vs. metabolic vs. neurologic   Vomiting possibly secondary to enteritis vs. GERD vs. uremia? 3. Thrombocytopenia   Hyperglycemia secondary to Type 2 DM  Hypoalbuminemia   History of GI bleed, anemia, HTN, Type 2 DM, AMADO, obesity, COPD (on 3 L home O2), acute on chronic respiratory failure, CAD, HLD, pancreatitis, s/p Micra pacemaker, ESRD dialysis (M/W/F), questionable communication deficits at baseline?    PLAN:    BIPAP 16/12-RR 15- fio2-ABG - ( 04 Apr 2023 18:20 )pH, Arterial: 7.20  pH, Blood: x     /  pCO2: 67    /  pO2: 84    / HCO3: 26    / Base Excess: -3.3  /  SaO2: 98.0    Change to AVAPS-30/15/8/400/40%-ABG in am-ABG - ( 05 Apr 2023 08:26 ) pH: 7.17  /  pCO2: 53    /  pO2: 84    / HCO3: 19    / Base Excess: -9.5  /  SaO2: 98      iv Cefepime /azithromycin  Taper iv solu-medrols 40mg q12hr  f/u UCx, BCx x 2, ABG, B12, folate  Sputum culture  S/p HD today, c/w M/W/F schedule   Hb 7.9, monitor H+H closely, occult negative on my exam -> sent a fecal occult as well -> may need Procrit injection  D/W hospitalist/d/w staff  DVT prophylasix

## 2023-04-05 NOTE — PROVIDER CONTACT NOTE (CRITICAL VALUE NOTIFICATION) - BACKGROUND
pt admitted for shortness of breath. pt on 4L NC. no complaints of shortness of breath. pt going for dialysis today. pt receiving cefepime, azithromycin, and Solu-medrol

## 2023-04-05 NOTE — PROGRESS NOTE ADULT - ASSESSMENT
67  HTN, DM2, COPD ( on 3 L home o2), CAD, HLD, pancreatitis, ESRD dialysis ( M/W/F), with poor UF/volume removal at HD    ESRD  -HD TIW, treatment today  -K protocol  -VOlume improved with aggresive HD and PUF on 4/4    Anemia  -VICKI protocol    COPD  -Nightly bipap, baseline o2     d/w hd rn chair side, hd staff

## 2023-04-05 NOTE — PROGRESS NOTE ADULT - SUBJECTIVE AND OBJECTIVE BOX
HOSPITALIST ATTENDING PROGRESS NOTE     Chart and meds reviewed. Patient seen and examined     CC: Weakness     Subjective: Seen and evaluated at bedside. On NIPPV. Lethargic in AM but mentation more improved in the PM.     04/05: Seen and evaluated at bedside. Mentation improving. Off NIPPV (changed to AVAPS). ABG this AM reviewed. Had BM, per RN. HD today.       All other systems and founds to be negative with exception of what has been described above.         PHYSICAL EXAM:  Vital Signs Last 24 Hrs  T(C): 36.8 (04 Apr 2023 16:50), Max: 37.9 (04 Apr 2023 11:30)  T(F): 98.2 (04 Apr 2023 16:50), Max: 100.3 (04 Apr 2023 11:30)  HR: 75 (04 Apr 2023 16:50) (63 - 95)  BP: 120/62 (04 Apr 2023 16:50) (93/42 - 131/71)  RR: 20 (04 Apr 2023 13:14) (18 - 20)  SpO2: 100% (04 Apr 2023 16:50) (92% - 100%)    Parameters below as of 04 Apr 2023 16:50  Patient On (Oxygen Delivery Method): BiPAP/CPAP        GEN: NAD   HEENT: EOMI, normal hearing, moist mucous membranes  NECK : Soft and supple, no JVD  LUNG: +diffuse rhonchi,   CVS: S1S2+, RRR, no M/G/R, +right tessio  GI: BS+, soft, NT/ND, no guarding, no rebound  EXTREMITIES: No peripheral edema, +left AVF  VASCULAR: 2+ peripheral pulses  NEURO: AAOx2, grossly non-focal   MSK: 5/5 strength b/l upper and lower extremities  SKIN: No rashes    MEDICATIONS  (STANDING):  albuterol/ipratropium for Nebulization 3 milliLiter(s) Nebulizer every 4 hours  artificial  tears Solution 1 Drop(s) Both EYES two times a day  aspirin  chewable 81 milliGRAM(s) Oral daily  atorvastatin 80 milliGRAM(s) Oral at bedtime  azithromycin   Tablet 500 milliGRAM(s) Oral daily  budesonide 160 MICROgram(s)/formoterol 4.5 MICROgram(s) Inhaler 2 Puff(s) Inhalation two times a day  cefepime   IVPB 2000 milliGRAM(s) IV Intermittent <User Schedule>  dextrose 5%. 1000 milliLiter(s) (100 mL/Hr) IV Continuous <Continuous>  dextrose 5%. 1000 milliLiter(s) (50 mL/Hr) IV Continuous <Continuous>  dextrose 50% Injectable 25 Gram(s) IV Push once  dextrose 50% Injectable 12.5 Gram(s) IV Push once  dextrose 50% Injectable 25 Gram(s) IV Push once  famotidine    Tablet 20 milliGRAM(s) Oral <User Schedule>  gabapentin 300 milliGRAM(s) Oral <User Schedule>  glucagon  Injectable 1 milliGRAM(s) IntraMuscular once  guaiFENesin ER 1200 milliGRAM(s) Oral every 12 hours  insulin lispro (ADMELOG) corrective regimen sliding scale   SubCutaneous three times a day before meals  insulin lispro (ADMELOG) corrective regimen sliding scale   SubCutaneous at bedtime  levothyroxine 75 MICROGram(s) Oral daily  methylPREDNISolone sodium succinate Injectable 40 milliGRAM(s) IV Push every 12 hours  midodrine. 5 milliGRAM(s) Oral three times a day  polyethylene glycol 3350 17 Gram(s) Oral daily  senna 2 Tablet(s) Oral at bedtime  sertraline 100 milliGRAM(s) Oral daily  tiotropium 2.5 MICROgram(s) Inhaler 2 Puff(s) Inhalation daily    MEDICATIONS  (PRN):  acetaminophen     Tablet .. 650 milliGRAM(s) Oral every 6 hours PRN Moderate Pain (4 - 6)  albuterol    0.083% 2.5 milliGRAM(s) Nebulizer every 6 hours PRN Shortness of Breath and/or Wheezing  aluminum hydroxide/magnesium hydroxide/simethicone Suspension 30 milliLiter(s) Oral every 4 hours PRN Dyspepsia  bisacodyl Suppository 10 milliGRAM(s) Rectal daily PRN Constipation  dextrose Oral Gel 15 Gram(s) Oral once PRN Blood Glucose LESS THAN 70 milliGRAM(s)/deciliter  lactulose Syrup 20 Gram(s) Oral two times a day PRN constipation  melatonin 3 milliGRAM(s) Oral at bedtime PRN Insomnia  ondansetron Injectable 4 milliGRAM(s) IV Push every 8 hours PRN Nausea and/or Vomiting      LABS: All Labs Reviewed:                        7.7    8.98  )-----------( 138      ( 05 Apr 2023 07:48 )             26.4   04-05    131<L>  |  97  |  56<H>  ----------------------------<  256<H>  5.5<H>   |  20<L>  |  4.90<H>    Ca    8.9      05 Apr 2023 07:48    TPro  6.4  /  Alb  2.6<L>  /  TBili  0.5  /  DBili  x   /  AST  9<L>  /  ALT  10<L>  /  AlkPhos  65  04-04      Culture - Blood (04.03.23 @ 11:48)    Specimen Source: .Blood None   Culture Results:   No growth to date.    Culture - Blood (04.03.23 @ 11:15)    Specimen Source: .Blood None   Culture Results:   No growth to date.    I&O's Detail    04 Apr 2023 07:01  -  05 Apr 2023 07:00  --------------------------------------------------------  IN:    IV PiggyBack: 50 mL    Other (mL): 500 mL  Total IN: 550 mL    OUT:    Other (mL): 2500 mL  Total OUT: 2500 mL    Total NET: -1950 mL      CAPILLARY BLOOD GLUCOSE      POCT Blood Glucose.: 85 mg/dL (05 Apr 2023 16:31)  POCT Blood Glucose.: 260 mg/dL (05 Apr 2023 11:13)  POCT Blood Glucose.: 275 mg/dL (05 Apr 2023 08:17)  POCT Blood Glucose.: 166 mg/dL (04 Apr 2023 21:58)      RADIOLOGY/EKG:    < from: CT Head No Cont (04.03.23 @ 21:15) >  FINDINGS:  Limited study due to motion.    There are areas of low attenuation in the periventricular white matter   likely related to mild chronic microvascular ischemic changes.    There is no acute intracranial hemorrhage, parenchymal mass, mass effect   or midline shift. There is no acute territorial infarct. There is no   hydrocephalus.    The cranium is intact. The visualized paranasal sinuses are well-aerated.   Partial opacification bilateral mastoid air cells noted        IMPRESSION:  Limited study due to motion    No acute intracranial hemorrhage or acute territorial infarct.  If   symptoms persist, follow-up MRI exam recommended.    < end of copied text >      < from: CT Abdomen and Pelvis No Cont (04.03.23 @ 21:15) >  FINDINGS:  LOWER CHEST: Basilar opacities. Trace left pleural effusion. Stable 7 mm   right lower lobe nodule. Cardiomegaly. Right ventricular intracardiac   device. Partially imaged central venous catheter tip in right atrium.   Coronary artery calcifications.    Please note that evaluation of the abdominal organs and vascular   structures is limited by lack of intravenous contrast.    LIVER: Within normal limits.  BILE DUCTS: Normal caliber.  GALLBLADDER: Cholecystectomy.  SPLEEN: Within normal limits.  PANCREAS: Diffuse calcifications.  ADRENALS: Within normal limits.  KIDNEYS/URETERS: Nonobstructive renal calculi and renal vascular   calcifications. No hydronephrosis.    BLADDER: Contains dense material, possibly excreted intravenous contrast.  REPRODUCTIVE ORGANS: Hysterectomy.    BOWEL: No bowel obstruction. Appendix is within normal limits. Scattered   colonic diverticula. Rectum distended by stool with mild rectal wall   thickening and inflammatory changes.  PERITONEUM: No ascites. Resectable edema.  VESSELS: Atherosclerotic calcifications. Inferior vena cava filter.  RETROPERITONEUM/LYMPH NODES: No lymphadenopathy.  ABDOMINAL WALL: Postsurgical changes.  BONES: Degenerative changes. Stable L2 wedge deformity.    IMPRESSION:    1. Basilar lung opacities, question pneumonia and/or atelectasis.  2. Rectum distended by stool with mild wall thickening and inflammation,   compatible with stercoral colitis.    < end of copied text >

## 2023-04-05 NOTE — CDI QUERY NOTE - NSCDIOTHERTXTBX_GEN_ALL_CORE_HH
Per Progress Note Adult-Pulmonology Attending on 4/5:   " Multifactorial: RUL consolidation/HCAP"   "Acute metabolic encephalopathy likely secondary to infectious etiology (HCAP)"     Progress Note Adult-Hospitalist Attending 4/4/:   "#Acute Hypoxic/Hypercapnic Respiratory Failure,  RUL consolidation, likely PNA,"    Per H+P : " Generalized weakness likely multifactorial: RUL consolidation/HCAP,"     Patient from SNF/ REHAB    ON : Antibiotics:  azithromycin   Tablet 500 milliGRAM(s) Oral daily  cefepime   IVPB 2000 milliGRAM(s) IV Intermittent    Please clarify the type of HCAP /  Pneumonia being treated with IV cefepime   - Gram Negative Pneumonia  - Aspiration pneumonia   - MRSA Pneumonia   - Pneumococcal pneumonia  - Other type of HCAP , please specify  - Unable to determine the type of HCAP

## 2023-04-05 NOTE — PROGRESS NOTE ADULT - ASSESSMENT
66 y/o F with PMH GI bleed, anemia, HTN, Type 2 DM, AMADO, obesity, COPD (on 3 L home O2), acute on chronic respiratory failure, CAD, HLD, pancreatitis, s/p Micra pacemaker, ESRD dialysis (M/W/F), s/p AV fistula placement questionable communication deficits at baseline? presented with weakness. The pt stated that she did not feel well the past few days and refused dialysis Friday and today. Reports feeling weak, tired, cough with mucus production, shortness of breath, vomiting. At the time of my evaluation the pt was unable to provide some of her history. I spoke to her daughter, Bel, over the phone who stated that her mother has not been acting like herself. She seems more confused than usual (normally she is A+Ox3) and more fatigued. This has happened in the past when her Hb has been low. She had a colonoscopy within the last 1 year which was negative. She used to get Procrit injections but has not been getting any recently. Additionally, she has been having chest congestion, shortness of breath, and cramping in her hands. The pt's daughter stated the pt has AMADO but she has not been wearing CPAP at [facility]. The pt had an AV fistula placed 3 weeks ago in the left arm.     #Acute Hypoxic/Hypercapnic Respiratory Failure,  RUL consolidation, likely PNA, Metabolic Encephalopathy   -CT head: negative   -CT chest, abdomen/pelvis: as above   -Cefepime + Azithro  -c/w Solumedrol   -1x dose IV Mg+  -Spiriva, Symbicort, Nebs   -NIPPV QHS   -f/u on cultures   -TSH and Vitamin B-12: normal   -aspiration precautions   -pulmonary consult and recommendations noted  -ID consult and recommendations noted       #Stercoral Colitis   -aggressive bowel regimen     #Anemia  -occult negative   -f/u on fecal occult   -on procrit     #ESRD  -on HD   -nephrology following    #VTE Prophylaxis  -SCDs       68 y/o F with PMH GI bleed, anemia, HTN, Type 2 DM, AMADO, obesity, COPD (on 3 L home O2), acute on chronic respiratory failure, CAD, HLD, pancreatitis, s/p Micra pacemaker, ESRD dialysis (M/W/F), s/p AV fistula placement questionable communication deficits at baseline? presented with weakness. The pt stated that she did not feel well the past few days and refused dialysis Friday and today. Reports feeling weak, tired, cough with mucus production, shortness of breath, vomiting. At the time of my evaluation the pt was unable to provide some of her history. I spoke to her daughter, Bel, over the phone who stated that her mother has not been acting like herself. She seems more confused than usual (normally she is A+Ox3) and more fatigued. This has happened in the past when her Hb has been low. She had a colonoscopy within the last 1 year which was negative. She used to get Procrit injections but has not been getting any recently. Additionally, she has been having chest congestion, shortness of breath, and cramping in her hands. The pt's daughter stated the pt has AMADO but she has not been wearing CPAP at [facility]. The pt had an AV fistula placed 3 weeks ago in the left arm.     #Acute Hypoxic/Hypercapnic Respiratory Failure,  RUL consolidation, likely PNA(Gram Negative Pneumonia), Metabolic Encephalopathy   -CT head: negative   -CT chest, abdomen/pelvis: as above   -Cefepime + Azithro  -c/w Solumedrol   -1x dose IV Mg+  -Spiriva, Symbicort, Nebs   -NIPPV QHS   -f/u on cultures   -TSH and Vitamin B-12: normal   -aspiration precautions   -pulmonary consult and recommendations noted  -ID consult and recommendations noted       #Stercoral Colitis   -aggressive bowel regimen     #Anemia  -occult negative   -f/u on fecal occult   -on procrit     #ESRD  -on HD   -nephrology following    #VTE Prophylaxis  -SCDs

## 2023-04-06 LAB
ANION GAP SERPL CALC-SCNC: 8 MMOL/L — SIGNIFICANT CHANGE UP (ref 5–17)
BUN SERPL-MCNC: 34 MG/DL — HIGH (ref 7–23)
CALCIUM SERPL-MCNC: 8.5 MG/DL — SIGNIFICANT CHANGE UP (ref 8.5–10.1)
CHLORIDE SERPL-SCNC: 98 MMOL/L — SIGNIFICANT CHANGE UP (ref 96–108)
CO2 SERPL-SCNC: 25 MMOL/L — SIGNIFICANT CHANGE UP (ref 22–31)
CREAT SERPL-MCNC: 3.18 MG/DL — HIGH (ref 0.5–1.3)
EGFR: 16 ML/MIN/1.73M2 — LOW
GLUCOSE BLDC GLUCOMTR-MCNC: 185 MG/DL — HIGH (ref 70–99)
GLUCOSE BLDC GLUCOMTR-MCNC: 241 MG/DL — HIGH (ref 70–99)
GLUCOSE BLDC GLUCOMTR-MCNC: 244 MG/DL — HIGH (ref 70–99)
GLUCOSE BLDC GLUCOMTR-MCNC: 284 MG/DL — HIGH (ref 70–99)
GLUCOSE SERPL-MCNC: 241 MG/DL — HIGH (ref 70–99)
HBV SURFACE AB SER-ACNC: SIGNIFICANT CHANGE UP
HCT VFR BLD CALC: 24.5 % — LOW (ref 34.5–45)
HGB BLD-MCNC: 7.4 G/DL — LOW (ref 11.5–15.5)
MCHC RBC-ENTMCNC: 30.2 GM/DL — LOW (ref 32–36)
MCHC RBC-ENTMCNC: 31.4 PG — SIGNIFICANT CHANGE UP (ref 27–34)
MCV RBC AUTO: 103.8 FL — HIGH (ref 80–100)
PLATELET # BLD AUTO: 137 K/UL — LOW (ref 150–400)
POTASSIUM SERPL-MCNC: 4.5 MMOL/L — SIGNIFICANT CHANGE UP (ref 3.5–5.3)
POTASSIUM SERPL-SCNC: 4.5 MMOL/L — SIGNIFICANT CHANGE UP (ref 3.5–5.3)
RBC # BLD: 2.36 M/UL — LOW (ref 3.8–5.2)
RBC # FLD: 16.3 % — HIGH (ref 10.3–14.5)
SODIUM SERPL-SCNC: 131 MMOL/L — LOW (ref 135–145)
WBC # BLD: 6.67 K/UL — SIGNIFICANT CHANGE UP (ref 3.8–10.5)
WBC # FLD AUTO: 6.67 K/UL — SIGNIFICANT CHANGE UP (ref 3.8–10.5)

## 2023-04-06 PROCEDURE — 99232 SBSQ HOSP IP/OBS MODERATE 35: CPT

## 2023-04-06 RX ADMIN — ATORVASTATIN CALCIUM 80 MILLIGRAM(S): 80 TABLET, FILM COATED ORAL at 22:19

## 2023-04-06 RX ADMIN — MIDODRINE HYDROCHLORIDE 5 MILLIGRAM(S): 2.5 TABLET ORAL at 06:14

## 2023-04-06 RX ADMIN — AZITHROMYCIN 500 MILLIGRAM(S): 500 TABLET, FILM COATED ORAL at 11:01

## 2023-04-06 RX ADMIN — Medication 3 MILLILITER(S): at 16:10

## 2023-04-06 RX ADMIN — Medication 1 DROP(S): at 11:14

## 2023-04-06 RX ADMIN — BUDESONIDE AND FORMOTEROL FUMARATE DIHYDRATE 2 PUFF(S): 160; 4.5 AEROSOL RESPIRATORY (INHALATION) at 08:29

## 2023-04-06 RX ADMIN — Medication 81 MILLIGRAM(S): at 11:01

## 2023-04-06 RX ADMIN — Medication 3 MILLILITER(S): at 12:34

## 2023-04-06 RX ADMIN — Medication 75 MICROGRAM(S): at 06:14

## 2023-04-06 RX ADMIN — Medication 3 MILLILITER(S): at 00:06

## 2023-04-06 RX ADMIN — BUDESONIDE AND FORMOTEROL FUMARATE DIHYDRATE 2 PUFF(S): 160; 4.5 AEROSOL RESPIRATORY (INHALATION) at 21:45

## 2023-04-06 RX ADMIN — Medication 1200 MILLIGRAM(S): at 11:01

## 2023-04-06 RX ADMIN — Medication 200 MILLIGRAM(S): at 17:20

## 2023-04-06 RX ADMIN — Medication 3 MILLILITER(S): at 08:28

## 2023-04-06 RX ADMIN — TIOTROPIUM BROMIDE 2 PUFF(S): 18 CAPSULE ORAL; RESPIRATORY (INHALATION) at 08:32

## 2023-04-06 RX ADMIN — Medication 3 MILLILITER(S): at 03:13

## 2023-04-06 RX ADMIN — SERTRALINE 100 MILLIGRAM(S): 25 TABLET, FILM COATED ORAL at 11:03

## 2023-04-06 RX ADMIN — Medication 2: at 12:09

## 2023-04-06 RX ADMIN — SENNA PLUS 2 TABLET(S): 8.6 TABLET ORAL at 22:19

## 2023-04-06 RX ADMIN — LACTULOSE 20 GRAM(S): 10 SOLUTION ORAL at 12:08

## 2023-04-06 RX ADMIN — Medication 1 DROP(S): at 22:19

## 2023-04-06 RX ADMIN — Medication 6: at 17:19

## 2023-04-06 RX ADMIN — Medication 3 MILLILITER(S): at 21:45

## 2023-04-06 RX ADMIN — Medication 40 MILLIGRAM(S): at 11:02

## 2023-04-06 RX ADMIN — Medication 4: at 08:13

## 2023-04-06 NOTE — PROGRESS NOTE ADULT - ASSESSMENT
68 y/o F with h/o GI bleed, anemia, HTN, Type 2 DM, AMADO, obesity, COPD (on 3 L home O2), chronic respiratory failure, CAD, HLD, pancreatitis, s/p Micra pacemaker, ESRD dialysis (M/W/F), AV fistula placement 3 weeks ago, questionable communication deficits at baseline was admitted on 4/3 for increased weakness. The pt stated that she did not feel well the past few days and refused dialysis on the day prior of admission. Reported feeling weak, tired, cough with mucus production, shortness of breath. At the time of my evaluation the pt was unable to provide some of her history. She seems more confused than usual (normally she is A+Ox3) and more fatigued. This has happened in the past when her Hb has been low. She used to get Procrit injections but has not been getting any recently. Additionally, she has been having chest congestion, shortness of breath, and cramping in her hands. In ER she received zosyn and vancomycin IV, then cefepime.     1. Acute on chronic respiratory failure. RUL consolidation likely PNA. COPD. Metabolic encephalopathy. ESRD dialysis (M/W/F).   -low grade fever  -WBC WNL  -on cefepime 2 gm IV qHD and azithromycin 500 mg PO qd # 3  -tolerating abx well so far; no side effects noted  -respiratory care  -f/u cultures  -continue abx coverage  -monitor temps  -f/u cbc and BMP  -Supportive care  2.other issues  -care per med

## 2023-04-06 NOTE — PROGRESS NOTE ADULT - SUBJECTIVE AND OBJECTIVE BOX
Date of service: 04-06-23 @ 13:49    Lying in bed in NAD  Has dry cough  SOB is improving    ROS: no fever or chills; denies dizziness, no HA, no abdominal pain, no diarrhea or constipation; no dysuria, no legs pain, no rashes    MEDICATIONS  (STANDING):  albuterol/ipratropium for Nebulization 3 milliLiter(s) Nebulizer every 4 hours  artificial  tears Solution 1 Drop(s) Both EYES two times a day  aspirin  chewable 81 milliGRAM(s) Oral daily  atorvastatin 80 milliGRAM(s) Oral at bedtime  azithromycin   Tablet 500 milliGRAM(s) Oral daily  budesonide 160 MICROgram(s)/formoterol 4.5 MICROgram(s) Inhaler 2 Puff(s) Inhalation two times a day  cefepime   IVPB 2000 milliGRAM(s) IV Intermittent <User Schedule>  dextrose 5%. 1000 milliLiter(s) (100 mL/Hr) IV Continuous <Continuous>  dextrose 5%. 1000 milliLiter(s) (50 mL/Hr) IV Continuous <Continuous>  dextrose 50% Injectable 25 Gram(s) IV Push once  dextrose 50% Injectable 12.5 Gram(s) IV Push once  dextrose 50% Injectable 25 Gram(s) IV Push once  famotidine    Tablet 20 milliGRAM(s) Oral <User Schedule>  gabapentin 300 milliGRAM(s) Oral <User Schedule>  glucagon  Injectable 1 milliGRAM(s) IntraMuscular once  guaiFENesin ER 1200 milliGRAM(s) Oral every 12 hours  insulin lispro (ADMELOG) corrective regimen sliding scale   SubCutaneous three times a day before meals  insulin lispro (ADMELOG) corrective regimen sliding scale   SubCutaneous at bedtime  levothyroxine 75 MICROGram(s) Oral daily  methylPREDNISolone sodium succinate Injectable 40 milliGRAM(s) IV Push daily  midodrine. 5 milliGRAM(s) Oral three times a day  polyethylene glycol 3350 17 Gram(s) Oral daily  senna 2 Tablet(s) Oral at bedtime  sertraline 100 milliGRAM(s) Oral daily  tiotropium 2.5 MICROgram(s) Inhaler 2 Puff(s) Inhalation daily    Vital Signs Last 24 Hrs  T(C): 36.5 (06 Apr 2023 08:35), Max: 36.9 (05 Apr 2023 14:57)  T(F): 97.7 (06 Apr 2023 08:35), Max: 98.4 (05 Apr 2023 14:57)  HR: 78 (06 Apr 2023 11:13) (74 - 82)  BP: 130/59 (06 Apr 2023 11:13) (114/60 - 145/76)  BP(mean): --  RR: 17 (06 Apr 2023 08:35) (16 - 18)  SpO2: 96% (06 Apr 2023 08:35) (95% - 100%)    Parameters below as of 06 Apr 2023 08:35  Patient On (Oxygen Delivery Method): nasal cannula     Physical exam:    Constitutional:  No acute distress  HEENT: NC/AT, EOMI, PERRLA, conjunctivae clear; ears and nose atraumatic  Neck: supple; thyroid not palpable  Right chest Tessio line in place  Back: no tenderness  Respiratory: b/l Rhonchi   Cardiovascular: S1S2 regular, no murmurs  Abdomen: soft, not tender, not distended, positive BS  Genitourinary: no suprapubic tenderness  Lymphatic: no LN palpable  Musculoskeletal: no muscle tenderness, no joint swelling or tenderness  Extremities: no pedal edema  Neurological/ Psychiatric: AxOx3, judgement and insight impaired; moving all extremities  Skin: no rashes; no palpable lesions    Labs: reviewed                        7.4    6.67  )-----------( 137      ( 06 Apr 2023 09:01 )             24.5     04-06    131<L>  |  98  |  34<H>  ----------------------------<  241<H>  4.5   |  25  |  3.18<H>    Ca    8.5      06 Apr 2023 09:01                        7.1    10.20 )-----------( 104      ( 04 Apr 2023 07:59 )             24.4     04-04    135  |  100  |  31<H>  ----------------------------<  111<H>  4.2   |  29  |  3.69<H>    Ca    8.3<L>      04 Apr 2023 07:59    TPro  6.4  /  Alb  2.6<L>  /  TBili  0.5  /  DBili  x   /  AST  9<L>  /  ALT  10<L>  /  AlkPhos  65  04-04     LIVER FUNCTIONS - ( 04 Apr 2023 07:59 )  Alb: 2.6 g/dL / Pro: 6.4 gm/dL / ALK PHOS: 65 U/L / ALT: 10 U/L / AST: 9 U/L / GGT: x           (04-03 @ 10:53)  NotDete    Culture - Blood (collected 03 Apr 2023 11:48)  Source: .Blood None  Preliminary Report (04 Apr 2023 17:01):    No growth to date.    Culture - Blood (collected 03 Apr 2023 11:15)  Source: .Blood None  Preliminary Report (04 Apr 2023 17:01):    No growth to date.    Radiology: all available radiological tests reviewed    - CXR: central venous catheter in SVC, micra pacemaker, RUL PNA  - CT chest: New consolidation is noted in the posterior segment of the right upper lobe. Differential diagnostic consideration includes pneumonia. 0.8 cm solid nodule in RLL unchanged from prior. Trace b/l effusions. Chronic pancreatitis. Increased left lower lobe atelectasis when compared to previous exam.    Advanced directives addressed: full resuscitation

## 2023-04-06 NOTE — PROGRESS NOTE ADULT - ASSESSMENT
66 y/o F presented with weakness     PROBLEMS:    AC hypoxaemic & hypercapneic Respiratory failurew  Multifactorial: RUL consolidation/HCAP  AMADO not on CPAP, likely obesity hypoventilation syndrome,  ESRD missed session of dialysis/increaesd pleural effusions and fluid overload  Macrocytic anemia   Acute metabolic encephalopathy likely secondary to infectious etiology (HCAP) vs. metabolic vs. neurologic   Vomiting possibly secondary to enteritis vs. GERD vs. uremia? 3. Thrombocytopenia   Hyperglycemia secondary to Type 2 DM  Hypoalbuminemia   History of GI bleed, anemia, HTN, Type 2 DM, AMADO, obesity, COPD (on 3 L home O2), acute on chronic respiratory failure, CAD, HLD, pancreatitis, s/p Micra pacemaker, ESRD dialysis (M/W/F), questionable communication deficits at baseline?    PLAN:    BIPAP 16/12-RR 15- fio2-ABG - ( 04 Apr 2023 18:20 )pH, Arterial: 7.20  pH, Blood: x     /  pCO2: 67    /  pO2: 84    / HCO3: 26    / Base Excess: -3.3  /  SaO2: 98.0    Change to AVAPS-30/15/8/400/40%-ABG in am-ABG - ( 05 Apr 2023 08:26 ) pH: 7.17  /  pCO2: 53    /  pO2: 84    / HCO3: 19    / Base Excess: -9.5  /  SaO2: 98      iv Cefepime /azithromycin  Taper iv solu-medrols 40mg q12hr  f/u UCx, BCx x 2, ABG, B12, folate  Sputum culture  S/p HD today, c/w M/W/F schedule   Hb 7.9, monitor H+H closely, occult negative on my exam -> sent a fecal occult as well -> may need Procrit injection  D/W hospitalist/d/w staff  DVT prophylasix       68 y/o F presented with weakness     PROBLEMS:    AC hypoxaemic & hypercapneic Respiratory failurew  Multifactorial: RUL consolidation/HCAP  AMADO not on CPAP, likely obesity hypoventilation syndrome,  ESRD missed session of dialysis/increaesd pleural effusions and fluid overload  Macrocytic anemia   Acute metabolic encephalopathy likely secondary to infectious etiology (HCAP) vs. metabolic vs. neurologic   Vomiting possibly secondary to enteritis vs. GERD vs. uremia? 3. Thrombocytopenia   Hyperglycemia secondary to Type 2 DM  Hypoalbuminemia   History of GI bleed, anemia, HTN, Type 2 DM, AMADO, obesity, COPD (on 3 L home O2), acute on chronic respiratory failure, CAD, HLD, pancreatitis, s/p Micra pacemaker, ESRD dialysis (M/W/F), questionable communication deficits at baseline?    PLAN:    pulmonary better-used pap qhs, on 4lpm nc sat 96%  BIPAP 16/12-RR 15- fio2-ABG - ( 04 Apr 2023 18:20 )pH, Arterial: 7.20  pH, Blood: x     /  pCO2: 67    /  pO2: 84    / HCO3: 26    / Base Excess: -3.3  /  SaO2: 98.0    Change to AVAPS-30/15/8/400/40%-ABG in am-ABG - ( 05 Apr 2023 08:26 ) pH: 7.17  /  pCO2: 53    /  pO2: 84    / HCO3: 19    / Base Excess: -9.5  /  SaO2: 98      iv Cefepime /azithromycin  Taper iv solu-medrols 40mg qdaily  f/u UCx, BCx x 2, ABG, B12, folate  Sputum culture  S/p HD today, c/w M/W/F schedule   Hb 7.9, monitor H+H closely, occult negative on my exam -> sent a fecal occult as well -> may need Procrit injection  D/W hospitalist/d/w staff  DVT prophylasix

## 2023-04-06 NOTE — PROGRESS NOTE ADULT - SUBJECTIVE AND OBJECTIVE BOX
Subjective:    Home Medications:  Albuterol (Eqv-ProAir HFA) 90 mcg/inh inhalation aerosol: 2 puff(s) inhaled every 12 hours as needed for  shortness of breath and/or wheezing (03 Apr 2023 15:12)  Artificial Tears ophthalmic solution: 1 drop(s) in each eye 2 times a day (03 Apr 2023 15:12)  aspirin 81 mg oral tablet, chewable: 1 tab(s) orally once a day (03 Apr 2023 10:55)  Colace 100 mg oral capsule: 2 cap(s) orally once a day (at bedtime), HOLD for diarrhea (03 Apr 2023 10:55)  famotidine 20 mg oral tablet: 1 tab(s) orally Monday, Wednesday, and Friday after dialysis  (03 Apr 2023 10:55)  gabapentin 300 mg oral capsule: 1 cap(s) orally Monday, Wednesday, and Friday at bedtime  (03 Apr 2023 10:55)  HumaLOG 100 units/mL subcutaneous solution: subcutaneous 3 times a day (before meals) per sliding scale:  151-200 = 1 units  201-250 = 2 units  251-300 = 3 units  301-350 = 4 units  351-400 = 6 units  &gt;400 = call MD   (03 Apr 2023 11:07)  Lantus Solostar Pen 100 units/mL subcutaneous solution: 7 unit(s) subcutaneous once a day (at bedtime) (03 Apr 2023 10:55)  levothyroxine 75 mcg (0.075 mg) oral tablet: 1 tab(s) orally once a day (03 Apr 2023 10:55)  midodrine 5 mg oral tablet: 1 tab(s) orally 3 times a day ***hold if SBP &gt; 115*** (03 Apr 2023 15:12)  Robitussin Head and Chest Congestion 100 mg-5 mg/5 mL oral liquid: 10 milliliter(s) orally 3 times a day (03 Apr 2023 15:12)  rosuvastatin 20 mg oral tablet: 1 tab(s) orally once a day (in the evening) (03 Apr 2023 10:55)  Senna 8.6 mg oral tablet: 2 tab(s) orally once a day (at bedtime), hold for Diarrhea (03 Apr 2023 10:55)  sertraline 100 mg oral tablet: 1 tab(s) orally once a day (03 Apr 2023 10:55)  Symbicort 160 mcg-4.5 mcg/inh inhalation aerosol: 2 puff(s) inhaled 2 times a day (03 Apr 2023 10:55)  tiotropium 2.5 mcg/inh inhalation aerosol: 2 puff(s) inhaled once a day (03 Apr 2023 10:55)  Tylenol Extra Strength 500 mg oral tablet: 1 tab(s) orally every 6 hours, As Needed (03 Apr 2023 10:55)    MEDICATIONS  (STANDING):  albuterol/ipratropium for Nebulization 3 milliLiter(s) Nebulizer every 4 hours  artificial  tears Solution 1 Drop(s) Both EYES two times a day  aspirin  chewable 81 milliGRAM(s) Oral daily  atorvastatin 80 milliGRAM(s) Oral at bedtime  azithromycin   Tablet 500 milliGRAM(s) Oral daily  budesonide 160 MICROgram(s)/formoterol 4.5 MICROgram(s) Inhaler 2 Puff(s) Inhalation two times a day  cefepime   IVPB 2000 milliGRAM(s) IV Intermittent <User Schedule>  dextrose 5%. 1000 milliLiter(s) (100 mL/Hr) IV Continuous <Continuous>  dextrose 5%. 1000 milliLiter(s) (50 mL/Hr) IV Continuous <Continuous>  dextrose 50% Injectable 25 Gram(s) IV Push once  dextrose 50% Injectable 12.5 Gram(s) IV Push once  dextrose 50% Injectable 25 Gram(s) IV Push once  famotidine    Tablet 20 milliGRAM(s) Oral <User Schedule>  gabapentin 300 milliGRAM(s) Oral <User Schedule>  glucagon  Injectable 1 milliGRAM(s) IntraMuscular once  guaiFENesin ER 1200 milliGRAM(s) Oral every 12 hours  insulin lispro (ADMELOG) corrective regimen sliding scale   SubCutaneous three times a day before meals  insulin lispro (ADMELOG) corrective regimen sliding scale   SubCutaneous at bedtime  levothyroxine 75 MICROGram(s) Oral daily  methylPREDNISolone sodium succinate Injectable 40 milliGRAM(s) IV Push every 12 hours  midodrine. 5 milliGRAM(s) Oral three times a day  polyethylene glycol 3350 17 Gram(s) Oral daily  senna 2 Tablet(s) Oral at bedtime  sertraline 100 milliGRAM(s) Oral daily  tiotropium 2.5 MICROgram(s) Inhaler 2 Puff(s) Inhalation daily    MEDICATIONS  (PRN):  acetaminophen     Tablet .. 650 milliGRAM(s) Oral every 6 hours PRN Moderate Pain (4 - 6)  albuterol    0.083% 2.5 milliGRAM(s) Nebulizer every 6 hours PRN Shortness of Breath and/or Wheezing  aluminum hydroxide/magnesium hydroxide/simethicone Suspension 30 milliLiter(s) Oral every 4 hours PRN Dyspepsia  bisacodyl Suppository 10 milliGRAM(s) Rectal daily PRN Constipation  dextrose Oral Gel 15 Gram(s) Oral once PRN Blood Glucose LESS THAN 70 milliGRAM(s)/deciliter  lactulose Syrup 20 Gram(s) Oral two times a day PRN constipation  melatonin 3 milliGRAM(s) Oral at bedtime PRN Insomnia  ondansetron Injectable 4 milliGRAM(s) IV Push every 8 hours PRN Nausea and/or Vomiting      Allergies    [place] (Unknown)  No Known Drug Allergies    Intolerances        Vital Signs Last 24 Hrs  T(C): 36.5 (06 Apr 2023 08:35), Max: 36.9 (05 Apr 2023 14:57)  T(F): 97.7 (06 Apr 2023 08:35), Max: 98.4 (05 Apr 2023 14:57)  HR: 76 (06 Apr 2023 08:35) (74 - 82)  BP: 134/67 (06 Apr 2023 08:35) (112/52 - 145/76)  BP(mean): --  RR: 17 (06 Apr 2023 08:35) (16 - 18)  SpO2: 96% (06 Apr 2023 08:35) (95% - 100%)    Parameters below as of 06 Apr 2023 08:35  Patient On (Oxygen Delivery Method): nasal cannula          PHYSICAL EXAMINATION:    NECK:  Supple. No lymphadenopathy. Jugular venous pressure not elevated. Carotids equal.   HEART:   The cardiac impulse has a normal quality. Reg., Nl S1 and S2.  There are no murmurs, rubs or gallops noted  CHEST:  Chest is clear to auscultation. Normal respiratory effort.  ABDOMEN:  Soft and nontender.   EXTREMITIES:  There is no edema.       LABS:                        7.7    8.98  )-----------( 138      ( 05 Apr 2023 07:48 )             26.4     04-05    131<L>  |  97  |  56<H>  ----------------------------<  256<H>  5.5<H>   |  20<L>  |  4.90<H>    Ca    8.9      05 Apr 2023 07:48                 Subjective:    pat better, lying in bed, no respiratory distress.    Home Medications:  Albuterol (Eqv-ProAir HFA) 90 mcg/inh inhalation aerosol: 2 puff(s) inhaled every 12 hours as needed for  shortness of breath and/or wheezing (03 Apr 2023 15:12)  Artificial Tears ophthalmic solution: 1 drop(s) in each eye 2 times a day (03 Apr 2023 15:12)  aspirin 81 mg oral tablet, chewable: 1 tab(s) orally once a day (03 Apr 2023 10:55)  Colace 100 mg oral capsule: 2 cap(s) orally once a day (at bedtime), HOLD for diarrhea (03 Apr 2023 10:55)  famotidine 20 mg oral tablet: 1 tab(s) orally Monday, Wednesday, and Friday after dialysis  (03 Apr 2023 10:55)  gabapentin 300 mg oral capsule: 1 cap(s) orally Monday, Wednesday, and Friday at bedtime  (03 Apr 2023 10:55)  HumaLOG 100 units/mL subcutaneous solution: subcutaneous 3 times a day (before meals) per sliding scale:  151-200 = 1 units  201-250 = 2 units  251-300 = 3 units  301-350 = 4 units  351-400 = 6 units  &gt;400 = call MD   (03 Apr 2023 11:07)  Lantus Solostar Pen 100 units/mL subcutaneous solution: 7 unit(s) subcutaneous once a day (at bedtime) (03 Apr 2023 10:55)  levothyroxine 75 mcg (0.075 mg) oral tablet: 1 tab(s) orally once a day (03 Apr 2023 10:55)  midodrine 5 mg oral tablet: 1 tab(s) orally 3 times a day ***hold if SBP &gt; 115*** (03 Apr 2023 15:12)  Robitussin Head and Chest Congestion 100 mg-5 mg/5 mL oral liquid: 10 milliliter(s) orally 3 times a day (03 Apr 2023 15:12)  rosuvastatin 20 mg oral tablet: 1 tab(s) orally once a day (in the evening) (03 Apr 2023 10:55)  Senna 8.6 mg oral tablet: 2 tab(s) orally once a day (at bedtime), hold for Diarrhea (03 Apr 2023 10:55)  sertraline 100 mg oral tablet: 1 tab(s) orally once a day (03 Apr 2023 10:55)  Symbicort 160 mcg-4.5 mcg/inh inhalation aerosol: 2 puff(s) inhaled 2 times a day (03 Apr 2023 10:55)  tiotropium 2.5 mcg/inh inhalation aerosol: 2 puff(s) inhaled once a day (03 Apr 2023 10:55)  Tylenol Extra Strength 500 mg oral tablet: 1 tab(s) orally every 6 hours, As Needed (03 Apr 2023 10:55)    MEDICATIONS  (STANDING):  albuterol/ipratropium for Nebulization 3 milliLiter(s) Nebulizer every 4 hours  artificial  tears Solution 1 Drop(s) Both EYES two times a day  aspirin  chewable 81 milliGRAM(s) Oral daily  atorvastatin 80 milliGRAM(s) Oral at bedtime  azithromycin   Tablet 500 milliGRAM(s) Oral daily  budesonide 160 MICROgram(s)/formoterol 4.5 MICROgram(s) Inhaler 2 Puff(s) Inhalation two times a day  cefepime   IVPB 2000 milliGRAM(s) IV Intermittent <User Schedule>  dextrose 5%. 1000 milliLiter(s) (100 mL/Hr) IV Continuous <Continuous>  dextrose 5%. 1000 milliLiter(s) (50 mL/Hr) IV Continuous <Continuous>  dextrose 50% Injectable 25 Gram(s) IV Push once  dextrose 50% Injectable 12.5 Gram(s) IV Push once  dextrose 50% Injectable 25 Gram(s) IV Push once  famotidine    Tablet 20 milliGRAM(s) Oral <User Schedule>  gabapentin 300 milliGRAM(s) Oral <User Schedule>  glucagon  Injectable 1 milliGRAM(s) IntraMuscular once  guaiFENesin ER 1200 milliGRAM(s) Oral every 12 hours  insulin lispro (ADMELOG) corrective regimen sliding scale   SubCutaneous three times a day before meals  insulin lispro (ADMELOG) corrective regimen sliding scale   SubCutaneous at bedtime  levothyroxine 75 MICROGram(s) Oral daily  methylPREDNISolone sodium succinate Injectable 40 milliGRAM(s) IV Push every 12 hours  midodrine. 5 milliGRAM(s) Oral three times a day  polyethylene glycol 3350 17 Gram(s) Oral daily  senna 2 Tablet(s) Oral at bedtime  sertraline 100 milliGRAM(s) Oral daily  tiotropium 2.5 MICROgram(s) Inhaler 2 Puff(s) Inhalation daily    MEDICATIONS  (PRN):  acetaminophen     Tablet .. 650 milliGRAM(s) Oral every 6 hours PRN Moderate Pain (4 - 6)  albuterol    0.083% 2.5 milliGRAM(s) Nebulizer every 6 hours PRN Shortness of Breath and/or Wheezing  aluminum hydroxide/magnesium hydroxide/simethicone Suspension 30 milliLiter(s) Oral every 4 hours PRN Dyspepsia  bisacodyl Suppository 10 milliGRAM(s) Rectal daily PRN Constipation  dextrose Oral Gel 15 Gram(s) Oral once PRN Blood Glucose LESS THAN 70 milliGRAM(s)/deciliter  lactulose Syrup 20 Gram(s) Oral two times a day PRN constipation  melatonin 3 milliGRAM(s) Oral at bedtime PRN Insomnia  ondansetron Injectable 4 milliGRAM(s) IV Push every 8 hours PRN Nausea and/or Vomiting      Allergies    [place] (Unknown)  No Known Drug Allergies    Intolerances        Vital Signs Last 24 Hrs  T(C): 36.5 (06 Apr 2023 08:35), Max: 36.9 (05 Apr 2023 14:57)  T(F): 97.7 (06 Apr 2023 08:35), Max: 98.4 (05 Apr 2023 14:57)  HR: 76 (06 Apr 2023 08:35) (74 - 82)  BP: 134/67 (06 Apr 2023 08:35) (112/52 - 145/76)  BP(mean): --  RR: 17 (06 Apr 2023 08:35) (16 - 18)  SpO2: 96% (06 Apr 2023 08:35) (95% - 100%)    Parameters below as of 06 Apr 2023 08:35  Patient On (Oxygen Delivery Method): nasal cannula          PHYSICAL EXAMINATION:    NECK:  Supple. No lymphadenopathy. Jugular venous pressure not elevated. Carotids equal.   HEART:   The cardiac impulse has a normal quality. Reg., Nl S1 and S2.  There are no murmurs, rubs or gallops noted  CHEST:  Chest crackles to auscultation. Normal respiratory effort.  ABDOMEN:  Soft and nontender.   EXTREMITIES:  There is no edema.       LABS:                        7.7    8.98  )-----------( 138      ( 05 Apr 2023 07:48 )             26.4     04-05    131<L>  |  97  |  56<H>  ----------------------------<  256<H>  5.5<H>   |  20<L>  |  4.90<H>    Ca    8.9      05 Apr 2023 07:48

## 2023-04-06 NOTE — PROGRESS NOTE ADULT - SUBJECTIVE AND OBJECTIVE BOX
CC: Weakness       Subjective: Seen and evaluated at bedside. On NIPPV. Lethargic in AM but mentation more improved in the PM.     04/05: Seen and evaluated at bedside. Mentation improving. Off NIPPV (changed to AVAPS). ABG this AM reviewed. Had BM, per RN. HD today.   4/6:  Alert, having a lot of congestion, requesting a "Syrup" to help.   Discussed plan of care.  No fever, chills, + cough, + congestion.    All other systems and founds to be negative with exception of what has been described above.       Vital Signs Last 24 Hrs  T(C): 36.5 (06 Apr 2023 08:35), Max: 36.9 (05 Apr 2023 14:57)  T(F): 97.7 (06 Apr 2023 08:35), Max: 98.4 (05 Apr 2023 14:57)  HR: 78 (06 Apr 2023 11:13) (74 - 82)  BP: 130/59 (06 Apr 2023 11:13) (114/60 - 145/76)  BP(mean): --  RR: 17 (06 Apr 2023 08:35) (16 - 18)  SpO2: 96% (06 Apr 2023 08:35) (95% - 100%)    Parameters below as of 06 Apr 2023 08:35  Patient On (Oxygen Delivery Method): nasal cannula          GEN: NAD   HEENT: EOMI, normal hearing, moist mucous membranes  NECK : Soft and supple, no JVD  LUNG: +diffuse rhonchi,   CVS: S1S2+, RRR, no M/G/R, +right tessio  GI: BS+, soft, NT/ND, no guarding, no rebound  EXTREMITIES: No peripheral edema, +left AVF  VASCULAR: 2+ peripheral pulses  NEURO: AAOx2, grossly non-focal   MSK: 5/5 strength b/l upper and lower extremities  SKIN: No rashes      MEDICATIONS  (STANDING):  albuterol/ipratropium for Nebulization 3 milliLiter(s) Nebulizer every 4 hours  artificial  tears Solution 1 Drop(s) Both EYES two times a day  aspirin  chewable 81 milliGRAM(s) Oral daily  atorvastatin 80 milliGRAM(s) Oral at bedtime  azithromycin   Tablet 500 milliGRAM(s) Oral daily  budesonide 160 MICROgram(s)/formoterol 4.5 MICROgram(s) Inhaler 2 Puff(s) Inhalation two times a day  cefepime   IVPB 2000 milliGRAM(s) IV Intermittent <User Schedule>  dextrose 5%. 1000 milliLiter(s) (100 mL/Hr) IV Continuous <Continuous>  dextrose 5%. 1000 milliLiter(s) (50 mL/Hr) IV Continuous <Continuous>  dextrose 50% Injectable 25 Gram(s) IV Push once  dextrose 50% Injectable 12.5 Gram(s) IV Push once  dextrose 50% Injectable 25 Gram(s) IV Push once  famotidine    Tablet 20 milliGRAM(s) Oral <User Schedule>  gabapentin 300 milliGRAM(s) Oral <User Schedule>  glucagon  Injectable 1 milliGRAM(s) IntraMuscular once  guaiFENesin Oral Liquid (Sugar-Free) 200 milliGRAM(s) Oral every 6 hours  insulin lispro (ADMELOG) corrective regimen sliding scale   SubCutaneous three times a day before meals  insulin lispro (ADMELOG) corrective regimen sliding scale   SubCutaneous at bedtime  levothyroxine 75 MICROGram(s) Oral daily  methylPREDNISolone sodium succinate Injectable 40 milliGRAM(s) IV Push daily  midodrine. 5 milliGRAM(s) Oral three times a day  polyethylene glycol 3350 17 Gram(s) Oral daily  senna 2 Tablet(s) Oral at bedtime  sertraline 100 milliGRAM(s) Oral daily  tiotropium 2.5 MICROgram(s) Inhaler 2 Puff(s) Inhalation daily    MEDICATIONS  (PRN):  acetaminophen     Tablet .. 650 milliGRAM(s) Oral every 6 hours PRN Moderate Pain (4 - 6)  albuterol    0.083% 2.5 milliGRAM(s) Nebulizer every 6 hours PRN Shortness of Breath and/or Wheezing  aluminum hydroxide/magnesium hydroxide/simethicone Suspension 30 milliLiter(s) Oral every 4 hours PRN Dyspepsia  bisacodyl Suppository 10 milliGRAM(s) Rectal daily PRN Constipation  dextrose Oral Gel 15 Gram(s) Oral once PRN Blood Glucose LESS THAN 70 milliGRAM(s)/deciliter  lactulose Syrup 20 Gram(s) Oral two times a day PRN constipation  melatonin 3 milliGRAM(s) Oral at bedtime PRN Insomnia  ondansetron Injectable 4 milliGRAM(s) IV Push every 8 hours PRN Nausea and/or Vomiting                                7.4    6.67  )-----------( 137      ( 06 Apr 2023 09:01 )             24.5     04-06    131<L>  |  98  |  34<H>  ----------------------------<  241<H>  4.5   |  25  |  3.18<H>    Ca    8.5      06 Apr 2023 09:01      CAPILLARY BLOOD GLUCOSE      POCT Blood Glucose.: 185 mg/dL (06 Apr 2023 12:06)  POCT Blood Glucose.: 241 mg/dL (06 Apr 2023 08:10)  POCT Blood Glucose.: 217 mg/dL (05 Apr 2023 21:10)  POCT Blood Glucose.: 85 mg/dL (05 Apr 2023 16:31)          ABG - ( 05 Apr 2023 08:26 )  pH, Arterial: 7.17  pH, Blood: x     /  pCO2: 53    /  pO2: 84    / HCO3: 19    / Base Excess: -9.5  /  SaO2: 98              Assessment and Plan:  68 y/o F with PMH GI bleed, anemia, HTN, Type 2 DM, AMADO, obesity, COPD (on 3 L home O2), acute on chronic respiratory failure, CAD, HLD, pancreatitis, s/p Micra pacemaker, ESRD dialysis (M/W/F), s/p AV fistula placement questionable communication deficits at baseline? presented with weakness. The pt stated that she did not feel well the past few days and refused dialysis Friday and today. Reports feeling weak, tired, cough with mucus production, shortness of breath, vomiting. At the time of my evaluation the pt was unable to provide some of her history. I spoke to her daughter, Bel, over the phone who stated that her mother has not been acting like herself. She seems more confused than usual (normally she is A+Ox3) and more fatigued. This has happened in the past when her Hb has been low. She had a colonoscopy within the last 1 year which was negative. She used to get Procrit injections but has not been getting any recently. Additionally, she has been having chest congestion, shortness of breath, and cramping in her hands. The pt's daughter stated the pt has AMADO but she has not been wearing CPAP at [facility]. The pt had an AV fistula placed 3 weeks ago in the left arm.     #Acute on chronic Hypoxic/Hypercapnic Respiratory Failure,  RUL consolidation, likely PNA(Gram Negative Pneumonia), Metabolic Encephalopathy   -on 4L NC (baseline 3L)  -CT head: negative   -CT chest, abdomen/pelvis: as above   -c/w Cefepime + Azithro  -c/w Solumedrol per pulm   -Spiriva, Symbicort, Nebs   -robitussen   -NIPPV QHS   -cultures no growth   -TSH and Vitamin B-12: normal   -aspiration precautions   -pulmonary and ID following      #Stercoral Colitis   -aggressive bowel regimen     #Anemia likely due to chronic disease:  -stable  -on procrit     #ESRD  -on HD   -nephrology following    #VTE Prophylaxis  -SCDs

## 2023-04-07 LAB
ALBUMIN SERPL ELPH-MCNC: 3 G/DL — LOW (ref 3.3–5)
ANION GAP SERPL CALC-SCNC: 6 MMOL/L — SIGNIFICANT CHANGE UP (ref 5–17)
ANISOCYTOSIS BLD QL: SLIGHT — SIGNIFICANT CHANGE UP
BASOPHILS # BLD AUTO: 0 K/UL — SIGNIFICANT CHANGE UP (ref 0–0.2)
BASOPHILS NFR BLD AUTO: 0 % — SIGNIFICANT CHANGE UP (ref 0–2)
BLD GP AB SCN SERPL QL: SIGNIFICANT CHANGE UP
BUN SERPL-MCNC: 50 MG/DL — HIGH (ref 7–23)
CALCIUM SERPL-MCNC: 8.5 MG/DL — SIGNIFICANT CHANGE UP (ref 8.5–10.1)
CHLORIDE SERPL-SCNC: 99 MMOL/L — SIGNIFICANT CHANGE UP (ref 96–108)
CO2 SERPL-SCNC: 27 MMOL/L — SIGNIFICANT CHANGE UP (ref 22–31)
CREAT SERPL-MCNC: 4.7 MG/DL — HIGH (ref 0.5–1.3)
EGFR: 10 ML/MIN/1.73M2 — LOW
EOSINOPHIL # BLD AUTO: 0 K/UL — SIGNIFICANT CHANGE UP (ref 0–0.5)
EOSINOPHIL NFR BLD AUTO: 0 % — SIGNIFICANT CHANGE UP (ref 0–6)
GLUCOSE BLDC GLUCOMTR-MCNC: 192 MG/DL — HIGH (ref 70–99)
GLUCOSE BLDC GLUCOMTR-MCNC: 204 MG/DL — HIGH (ref 70–99)
GLUCOSE BLDC GLUCOMTR-MCNC: 222 MG/DL — HIGH (ref 70–99)
GLUCOSE BLDC GLUCOMTR-MCNC: 314 MG/DL — HIGH (ref 70–99)
GLUCOSE SERPL-MCNC: 199 MG/DL — HIGH (ref 70–99)
HCT VFR BLD CALC: 23.7 % — LOW (ref 34.5–45)
HGB BLD-MCNC: 7.3 G/DL — LOW (ref 11.5–15.5)
IMM GRANULOCYTES NFR BLD AUTO: 0.4 % — SIGNIFICANT CHANGE UP (ref 0–0.9)
LYMPHOCYTES # BLD AUTO: 0.39 K/UL — LOW (ref 1–3.3)
LYMPHOCYTES # BLD AUTO: 5.8 % — LOW (ref 13–44)
MACROCYTES BLD QL: SLIGHT — SIGNIFICANT CHANGE UP
MANUAL SMEAR VERIFICATION: SIGNIFICANT CHANGE UP
MCHC RBC-ENTMCNC: 30.8 GM/DL — LOW (ref 32–36)
MCHC RBC-ENTMCNC: 31.5 PG — SIGNIFICANT CHANGE UP (ref 27–34)
MCV RBC AUTO: 102.2 FL — HIGH (ref 80–100)
MONOCYTES # BLD AUTO: 0.74 K/UL — SIGNIFICANT CHANGE UP (ref 0–0.9)
MONOCYTES NFR BLD AUTO: 11.1 % — SIGNIFICANT CHANGE UP (ref 2–14)
NEUTROPHILS # BLD AUTO: 5.53 K/UL — SIGNIFICANT CHANGE UP (ref 1.8–7.4)
NEUTROPHILS NFR BLD AUTO: 82.7 % — HIGH (ref 43–77)
OVALOCYTES BLD QL SMEAR: SIGNIFICANT CHANGE UP
PHOSPHATE SERPL-MCNC: 4.7 MG/DL — HIGH (ref 2.5–4.5)
PLAT MORPH BLD: NORMAL — SIGNIFICANT CHANGE UP
PLATELET # BLD AUTO: 142 K/UL — LOW (ref 150–400)
POIKILOCYTOSIS BLD QL AUTO: SIGNIFICANT CHANGE UP
POLYCHROMASIA BLD QL SMEAR: SLIGHT — SIGNIFICANT CHANGE UP
POTASSIUM SERPL-MCNC: 4.1 MMOL/L — SIGNIFICANT CHANGE UP (ref 3.5–5.3)
POTASSIUM SERPL-SCNC: 4.1 MMOL/L — SIGNIFICANT CHANGE UP (ref 3.5–5.3)
RBC # BLD: 2.32 M/UL — LOW (ref 3.8–5.2)
RBC # FLD: 16.2 % — HIGH (ref 10.3–14.5)
RBC BLD AUTO: ABNORMAL
SODIUM SERPL-SCNC: 132 MMOL/L — LOW (ref 135–145)
SPHEROCYTES BLD QL SMEAR: SLIGHT — SIGNIFICANT CHANGE UP
STOMATOCYTES BLD QL SMEAR: SIGNIFICANT CHANGE UP
TARGETS BLD QL SMEAR: SLIGHT — SIGNIFICANT CHANGE UP
WBC # BLD: 6.69 K/UL — SIGNIFICANT CHANGE UP (ref 3.8–10.5)
WBC # FLD AUTO: 6.69 K/UL — SIGNIFICANT CHANGE UP (ref 3.8–10.5)

## 2023-04-07 PROCEDURE — 99233 SBSQ HOSP IP/OBS HIGH 50: CPT

## 2023-04-07 RX ORDER — GLUCAGON INJECTION, SOLUTION 0.5 MG/.1ML
1 INJECTION, SOLUTION SUBCUTANEOUS ONCE
Refills: 0 | Status: DISCONTINUED | OUTPATIENT
Start: 2023-04-07 | End: 2023-04-11

## 2023-04-07 RX ORDER — DEXTROSE 50 % IN WATER 50 %
25 SYRINGE (ML) INTRAVENOUS ONCE
Refills: 0 | Status: DISCONTINUED | OUTPATIENT
Start: 2023-04-07 | End: 2023-04-11

## 2023-04-07 RX ORDER — SODIUM CHLORIDE 9 MG/ML
1000 INJECTION, SOLUTION INTRAVENOUS
Refills: 0 | Status: DISCONTINUED | OUTPATIENT
Start: 2023-04-07 | End: 2023-04-11

## 2023-04-07 RX ORDER — ERYTHROPOIETIN 10000 [IU]/ML
10000 INJECTION, SOLUTION INTRAVENOUS; SUBCUTANEOUS
Refills: 0 | Status: DISCONTINUED | OUTPATIENT
Start: 2023-04-07 | End: 2023-04-11

## 2023-04-07 RX ORDER — INSULIN LISPRO 100/ML
VIAL (ML) SUBCUTANEOUS
Refills: 0 | Status: DISCONTINUED | OUTPATIENT
Start: 2023-04-07 | End: 2023-04-11

## 2023-04-07 RX ORDER — DIPHENHYDRAMINE HCL 50 MG
25 CAPSULE ORAL ONCE
Refills: 0 | Status: COMPLETED | OUTPATIENT
Start: 2023-04-07 | End: 2023-04-07

## 2023-04-07 RX ORDER — DEXTROSE 50 % IN WATER 50 %
12.5 SYRINGE (ML) INTRAVENOUS ONCE
Refills: 0 | Status: DISCONTINUED | OUTPATIENT
Start: 2023-04-07 | End: 2023-04-11

## 2023-04-07 RX ORDER — DEXTROSE 50 % IN WATER 50 %
15 SYRINGE (ML) INTRAVENOUS ONCE
Refills: 0 | Status: DISCONTINUED | OUTPATIENT
Start: 2023-04-07 | End: 2023-04-11

## 2023-04-07 RX ORDER — INSULIN GLARGINE 100 [IU]/ML
5 INJECTION, SOLUTION SUBCUTANEOUS AT BEDTIME
Refills: 0 | Status: DISCONTINUED | OUTPATIENT
Start: 2023-04-07 | End: 2023-04-11

## 2023-04-07 RX ORDER — INSULIN LISPRO 100/ML
VIAL (ML) SUBCUTANEOUS AT BEDTIME
Refills: 0 | Status: DISCONTINUED | OUTPATIENT
Start: 2023-04-07 | End: 2023-04-11

## 2023-04-07 RX ADMIN — FAMOTIDINE 20 MILLIGRAM(S): 10 INJECTION INTRAVENOUS at 11:01

## 2023-04-07 RX ADMIN — ERYTHROPOIETIN 10000 UNIT(S): 10000 INJECTION, SOLUTION INTRAVENOUS; SUBCUTANEOUS at 14:26

## 2023-04-07 RX ADMIN — GABAPENTIN 300 MILLIGRAM(S): 400 CAPSULE ORAL at 21:27

## 2023-04-07 RX ADMIN — Medication 40 MILLIGRAM(S): at 09:55

## 2023-04-07 RX ADMIN — MIDODRINE HYDROCHLORIDE 5 MILLIGRAM(S): 2.5 TABLET ORAL at 12:40

## 2023-04-07 RX ADMIN — Medication 3 MILLILITER(S): at 08:46

## 2023-04-07 RX ADMIN — Medication 1 DROP(S): at 09:58

## 2023-04-07 RX ADMIN — SENNA PLUS 2 TABLET(S): 8.6 TABLET ORAL at 21:26

## 2023-04-07 RX ADMIN — Medication 200 MILLIGRAM(S): at 06:11

## 2023-04-07 RX ADMIN — BUDESONIDE AND FORMOTEROL FUMARATE DIHYDRATE 2 PUFF(S): 160; 4.5 AEROSOL RESPIRATORY (INHALATION) at 08:46

## 2023-04-07 RX ADMIN — Medication 1 DROP(S): at 21:26

## 2023-04-07 RX ADMIN — Medication 200 MILLIGRAM(S): at 12:39

## 2023-04-07 RX ADMIN — Medication 25 MILLIGRAM(S): at 14:40

## 2023-04-07 RX ADMIN — CEFEPIME 100 MILLIGRAM(S): 1 INJECTION, POWDER, FOR SOLUTION INTRAMUSCULAR; INTRAVENOUS at 17:35

## 2023-04-07 RX ADMIN — Medication 81 MILLIGRAM(S): at 09:56

## 2023-04-07 RX ADMIN — Medication 75 MICROGRAM(S): at 06:11

## 2023-04-07 RX ADMIN — Medication 3 MILLILITER(S): at 21:07

## 2023-04-07 RX ADMIN — Medication 2: at 11:44

## 2023-04-07 RX ADMIN — SERTRALINE 100 MILLIGRAM(S): 25 TABLET, FILM COATED ORAL at 09:57

## 2023-04-07 RX ADMIN — INSULIN GLARGINE 5 UNIT(S): 100 INJECTION, SOLUTION SUBCUTANEOUS at 21:27

## 2023-04-07 RX ADMIN — Medication 3 MILLILITER(S): at 00:50

## 2023-04-07 RX ADMIN — Medication 8: at 08:20

## 2023-04-07 RX ADMIN — Medication 4: at 17:36

## 2023-04-07 RX ADMIN — TIOTROPIUM BROMIDE 2 PUFF(S): 18 CAPSULE ORAL; RESPIRATORY (INHALATION) at 08:47

## 2023-04-07 RX ADMIN — AZITHROMYCIN 500 MILLIGRAM(S): 500 TABLET, FILM COATED ORAL at 09:57

## 2023-04-07 RX ADMIN — BUDESONIDE AND FORMOTEROL FUMARATE DIHYDRATE 2 PUFF(S): 160; 4.5 AEROSOL RESPIRATORY (INHALATION) at 21:08

## 2023-04-07 RX ADMIN — Medication 200 MILLIGRAM(S): at 17:35

## 2023-04-07 RX ADMIN — ATORVASTATIN CALCIUM 80 MILLIGRAM(S): 80 TABLET, FILM COATED ORAL at 21:26

## 2023-04-07 NOTE — PROGRESS NOTE ADULT - ASSESSMENT
68 y/o F presented with weakness     PROBLEMS:    AC hypoxaemic & hypercapneic Respiratory failurew  Multifactorial: RUL consolidation/HCAP  AMADO not on CPAP, likely obesity hypoventilation syndrome,  ESRD missed session of dialysis/increaesd pleural effusions and fluid overload  Macrocytic anemia   Acute metabolic encephalopathy likely secondary to infectious etiology (HCAP) vs. metabolic vs. neurologic   Vomiting possibly secondary to enteritis vs. GERD vs. uremia? 3. Thrombocytopenia   Hyperglycemia secondary to Type 2 DM  Hypoalbuminemia   History of GI bleed, anemia, HTN, Type 2 DM, AMADO, obesity, COPD (on 3 L home O2), acute on chronic respiratory failure, CAD, HLD, pancreatitis, s/p Micra pacemaker, ESRD dialysis (M/W/F), questionable communication deficits at baseline?    PLAN:    Pulmonary better-used pap qhs-on 4lpm nc sat 96%-titrate  BIPAP 16/12-RR 15- fio2-ABG - ( 04 Apr 2023 18:20 )pH, Arterial: 7.20  pH, Blood: x     /  pCO2: 67    /  pO2: 84    / HCO3: 26    / Base Excess: -3.3  /  SaO2: 98.0    Change to AVAPS-30/15/8/400/40%-ABG in am-ABG - ( 05 Apr 2023 08:26 ) pH: 7.17  /  pCO2: 53    /  pO2: 84    / HCO3: 19    / Base Excess: -9.5  /  SaO2: 98      IV Cefepime /azithromycin  IV solu-medrols 40mg qdaily  f/u UCx, BCx x 2, ABG, B12, folate  Sputum culture  S/p HD today, c/w M/W/F schedule   Hb 7.9, monitor H+H closely, occult negative on my exam -> sent a fecal occult as well -> may need Procrit injection  D/W hospitalist/d/w staff  DVT prophylasix

## 2023-04-07 NOTE — PROGRESS NOTE ADULT - SUBJECTIVE AND OBJECTIVE BOX
CC: Weakness       Subjective: Seen and evaluated at bedside. On NIPPV. Lethargic in AM but mentation more improved in the PM.     04/05: Seen and evaluated at bedside. Mentation improving. Off NIPPV (changed to AVAPS). ABG this AM reviewed. Had BM, per RN. HD today.   4/6:  Alert, having a lot of congestion, requesting a "Syrup" to help.   Discussed plan of care.  No fever, chills, + cough, + congestion.  4/7: Sitting in chair, less congested today, overall slowly improving.  Blood sugars elevated.  Awaiting HD today.      All other systems and founds to be negative with exception of what has been described above.       Vital Signs Last 24 Hrs  T(C): 36.5 (07 Apr 2023 08:19), Max: 37 (06 Apr 2023 15:15)  T(F): 97.7 (07 Apr 2023 08:19), Max: 98.6 (06 Apr 2023 15:15)  HR: 81 (07 Apr 2023 09:00) (70 - 86)  BP: 120/65 (07 Apr 2023 08:19) (114/50 - 138/71)  BP(mean): --  RR: 18 (07 Apr 2023 08:19) (17 - 18)  SpO2: 95% (07 Apr 2023 09:00) (95% - 100%)    Parameters below as of 07 Apr 2023 08:19  Patient On (Oxygen Delivery Method): nasal cannula  O2 Flow (L/min): 4      GEN: NAD   HEENT: EOMI, normal hearing, moist mucous membranes  NECK : Soft and supple, no JVD  LUNG: +diffuse rhonchi,   CVS: S1S2+, RRR, no M/G/R, +right tessio  GI: BS+, soft, NT/ND, no guarding, no rebound  EXTREMITIES: No peripheral edema, +left AVF  VASCULAR: 2+ peripheral pulses  NEURO: AAOx2, grossly non-focal   MSK: 5/5 strength b/l upper and lower extremities  SKIN: No rashes    MEDICATIONS  (STANDING):  albuterol/ipratropium for Nebulization 3 milliLiter(s) Nebulizer every 4 hours  artificial  tears Solution 1 Drop(s) Both EYES two times a day  aspirin  chewable 81 milliGRAM(s) Oral daily  atorvastatin 80 milliGRAM(s) Oral at bedtime  azithromycin   Tablet 500 milliGRAM(s) Oral daily  budesonide 160 MICROgram(s)/formoterol 4.5 MICROgram(s) Inhaler 2 Puff(s) Inhalation two times a day  cefepime   IVPB 2000 milliGRAM(s) IV Intermittent <User Schedule>  dextrose 5%. 1000 milliLiter(s) (50 mL/Hr) IV Continuous <Continuous>  dextrose 5%. 1000 milliLiter(s) (100 mL/Hr) IV Continuous <Continuous>  dextrose 50% Injectable 25 Gram(s) IV Push once  dextrose 50% Injectable 12.5 Gram(s) IV Push once  dextrose 50% Injectable 25 Gram(s) IV Push once  famotidine    Tablet 20 milliGRAM(s) Oral <User Schedule>  gabapentin 300 milliGRAM(s) Oral <User Schedule>  glucagon  Injectable 1 milliGRAM(s) IntraMuscular once  guaiFENesin Oral Liquid (Sugar-Free) 200 milliGRAM(s) Oral every 6 hours  insulin glargine Injectable (LANTUS) 5 Unit(s) SubCutaneous at bedtime  insulin lispro (ADMELOG) corrective regimen sliding scale   SubCutaneous three times a day before meals  insulin lispro (ADMELOG) corrective regimen sliding scale   SubCutaneous at bedtime  levothyroxine 75 MICROGram(s) Oral daily  methylPREDNISolone sodium succinate Injectable 40 milliGRAM(s) IV Push daily  midodrine. 5 milliGRAM(s) Oral three times a day  polyethylene glycol 3350 17 Gram(s) Oral daily  senna 2 Tablet(s) Oral at bedtime  sertraline 100 milliGRAM(s) Oral daily  tiotropium 2.5 MICROgram(s) Inhaler 2 Puff(s) Inhalation daily    MEDICATIONS  (PRN):  acetaminophen     Tablet .. 650 milliGRAM(s) Oral every 6 hours PRN Moderate Pain (4 - 6)  albuterol    0.083% 2.5 milliGRAM(s) Nebulizer every 6 hours PRN Shortness of Breath and/or Wheezing  aluminum hydroxide/magnesium hydroxide/simethicone Suspension 30 milliLiter(s) Oral every 4 hours PRN Dyspepsia  bisacodyl Suppository 10 milliGRAM(s) Rectal daily PRN Constipation  dextrose Oral Gel 15 Gram(s) Oral once PRN Blood Glucose LESS THAN 70 milliGRAM(s)/deciliter  lactulose Syrup 20 Gram(s) Oral two times a day PRN constipation  melatonin 3 milliGRAM(s) Oral at bedtime PRN Insomnia  ondansetron Injectable 4 milliGRAM(s) IV Push every 8 hours PRN Nausea and/or Vomiting                                7.3    6.69  )-----------( 142      ( 07 Apr 2023 11:57 )             23.7     04-07    132<L>  |  99  |  50<H>  ----------------------------<  199<H>  4.1   |  27  |  4.70<H>    Ca    8.5      07 Apr 2023 11:57  Phos  4.7     04-07    TPro  x   /  Alb  3.0<L>  /  TBili  x   /  DBili  x   /  AST  x   /  ALT  x   /  AlkPhos  x   04-07    CAPILLARY BLOOD GLUCOSE      POCT Blood Glucose.: 192 mg/dL (07 Apr 2023 11:27)  POCT Blood Glucose.: 314 mg/dL (07 Apr 2023 08:15)  POCT Blood Glucose.: 244 mg/dL (06 Apr 2023 21:47)  POCT Blood Glucose.: 284 mg/dL (06 Apr 2023 17:17)    LIVER FUNCTIONS - ( 07 Apr 2023 11:57 )  Alb: 3.0 g/dL / Pro: x     / ALK PHOS: x     / ALT: x     / AST: x     / GGT: x                   Assessment and Plan:  68 y/o F with PMH GI bleed, anemia, HTN, Type 2 DM, AMADO, obesity, COPD (on 3 L home O2), acute on chronic respiratory failure, CAD, HLD, pancreatitis, s/p Micra pacemaker, ESRD dialysis (M/W/F), s/p AV fistula placement questionable communication deficits at baseline? presented with weakness. The pt stated that she did not feel well the past few days and refused dialysis Friday and today. Reports feeling weak, tired, cough with mucus production, shortness of breath, vomiting. At the time of my evaluation the pt was unable to provide some of her history. I spoke to her daughter, Bel, over the phone who stated that her mother has not been acting like herself. She seems more confused than usual (normally she is A+Ox3) and more fatigued. This has happened in the past when her Hb has been low. She had a colonoscopy within the last 1 year which was negative. She used to get Procrit injections but has not been getting any recently. Additionally, she has been having chest congestion, shortness of breath, and cramping in her hands. The pt's daughter stated the pt has AMADO but she has not been wearing CPAP at [facility]. The pt had an AV fistula placed 3 weeks ago in the left arm.     #Acute on chronic Hypoxic/Hypercapnic Respiratory Failure,  RUL consolidation, likely PNA(Gram Negative Pneumonia), Metabolic Encephalopathy   -on 4L NC (baseline 3L)  -CT head: negative   -CT chest, abdomen/pelvis: as above   -c/w Cefepime + Azithro  -c/w Solumedrol per pulm   -Spiriva, Symbicort, Nebs   -robitussen   -NIPPV QHS   -cultures no growth   -TSH and Vitamin B-12: normal   -aspiration precautions   -pulmonary and ID following    DM2:  -a1c 6.2  -start lantus 5u qhs due to steroids and hyperglycemia  -RAISS    #Stercoral Colitis   -aggressive bowel regimen     #Anemia likely due to chronic disease:  -stable  -on procrit     #ESRD  -on HD   -nephrology following    #VTE Prophylaxis  -SCDs    Dispo:  -likely d/c planing for Monday        CC: Weakness       Subjective: Seen and evaluated at bedside. On NIPPV. Lethargic in AM but mentation more improved in the PM.     04/05: Seen and evaluated at bedside. Mentation improving. Off NIPPV (changed to AVAPS). ABG this AM reviewed. Had BM, per RN. HD today.   4/6:  Alert, having a lot of congestion, requesting a "Syrup" to help.   Discussed plan of care.  No fever, chills, + cough, + congestion.  4/7: Sitting in chair, less congested today, overall slowly improving.  Blood sugars elevated.  Awaiting HD today.      All other systems and founds to be negative with exception of what has been described above.       Vital Signs Last 24 Hrs  T(C): 36.5 (07 Apr 2023 08:19), Max: 37 (06 Apr 2023 15:15)  T(F): 97.7 (07 Apr 2023 08:19), Max: 98.6 (06 Apr 2023 15:15)  HR: 81 (07 Apr 2023 09:00) (70 - 86)  BP: 120/65 (07 Apr 2023 08:19) (114/50 - 138/71)  BP(mean): --  RR: 18 (07 Apr 2023 08:19) (17 - 18)  SpO2: 95% (07 Apr 2023 09:00) (95% - 100%)    Parameters below as of 07 Apr 2023 08:19  Patient On (Oxygen Delivery Method): nasal cannula  O2 Flow (L/min): 4      GEN: NAD   HEENT: EOMI, normal hearing, moist mucous membranes  NECK : Soft and supple, no JVD  LUNG: +diffuse rhonchi,   CVS: S1S2+, RRR, no M/G/R, +right tessio  GI: BS+, soft, NT/ND, no guarding, no rebound  EXTREMITIES: No peripheral edema, +left AVF  VASCULAR: 2+ peripheral pulses  NEURO: AAOx2, grossly non-focal   MSK: 5/5 strength b/l upper and lower extremities  SKIN: No rashes    MEDICATIONS  (STANDING):  albuterol/ipratropium for Nebulization 3 milliLiter(s) Nebulizer every 4 hours  artificial  tears Solution 1 Drop(s) Both EYES two times a day  aspirin  chewable 81 milliGRAM(s) Oral daily  atorvastatin 80 milliGRAM(s) Oral at bedtime  azithromycin   Tablet 500 milliGRAM(s) Oral daily  budesonide 160 MICROgram(s)/formoterol 4.5 MICROgram(s) Inhaler 2 Puff(s) Inhalation two times a day  cefepime   IVPB 2000 milliGRAM(s) IV Intermittent <User Schedule>  dextrose 5%. 1000 milliLiter(s) (50 mL/Hr) IV Continuous <Continuous>  dextrose 5%. 1000 milliLiter(s) (100 mL/Hr) IV Continuous <Continuous>  dextrose 50% Injectable 25 Gram(s) IV Push once  dextrose 50% Injectable 12.5 Gram(s) IV Push once  dextrose 50% Injectable 25 Gram(s) IV Push once  famotidine    Tablet 20 milliGRAM(s) Oral <User Schedule>  gabapentin 300 milliGRAM(s) Oral <User Schedule>  glucagon  Injectable 1 milliGRAM(s) IntraMuscular once  guaiFENesin Oral Liquid (Sugar-Free) 200 milliGRAM(s) Oral every 6 hours  insulin glargine Injectable (LANTUS) 5 Unit(s) SubCutaneous at bedtime  insulin lispro (ADMELOG) corrective regimen sliding scale   SubCutaneous three times a day before meals  insulin lispro (ADMELOG) corrective regimen sliding scale   SubCutaneous at bedtime  levothyroxine 75 MICROGram(s) Oral daily  methylPREDNISolone sodium succinate Injectable 40 milliGRAM(s) IV Push daily  midodrine. 5 milliGRAM(s) Oral three times a day  polyethylene glycol 3350 17 Gram(s) Oral daily  senna 2 Tablet(s) Oral at bedtime  sertraline 100 milliGRAM(s) Oral daily  tiotropium 2.5 MICROgram(s) Inhaler 2 Puff(s) Inhalation daily    MEDICATIONS  (PRN):  acetaminophen     Tablet .. 650 milliGRAM(s) Oral every 6 hours PRN Moderate Pain (4 - 6)  albuterol    0.083% 2.5 milliGRAM(s) Nebulizer every 6 hours PRN Shortness of Breath and/or Wheezing  aluminum hydroxide/magnesium hydroxide/simethicone Suspension 30 milliLiter(s) Oral every 4 hours PRN Dyspepsia  bisacodyl Suppository 10 milliGRAM(s) Rectal daily PRN Constipation  dextrose Oral Gel 15 Gram(s) Oral once PRN Blood Glucose LESS THAN 70 milliGRAM(s)/deciliter  lactulose Syrup 20 Gram(s) Oral two times a day PRN constipation  melatonin 3 milliGRAM(s) Oral at bedtime PRN Insomnia  ondansetron Injectable 4 milliGRAM(s) IV Push every 8 hours PRN Nausea and/or Vomiting                                7.3    6.69  )-----------( 142      ( 07 Apr 2023 11:57 )             23.7     04-07    132<L>  |  99  |  50<H>  ----------------------------<  199<H>  4.1   |  27  |  4.70<H>    Ca    8.5      07 Apr 2023 11:57  Phos  4.7     04-07    TPro  x   /  Alb  3.0<L>  /  TBili  x   /  DBili  x   /  AST  x   /  ALT  x   /  AlkPhos  x   04-07    CAPILLARY BLOOD GLUCOSE      POCT Blood Glucose.: 192 mg/dL (07 Apr 2023 11:27)  POCT Blood Glucose.: 314 mg/dL (07 Apr 2023 08:15)  POCT Blood Glucose.: 244 mg/dL (06 Apr 2023 21:47)  POCT Blood Glucose.: 284 mg/dL (06 Apr 2023 17:17)    LIVER FUNCTIONS - ( 07 Apr 2023 11:57 )  Alb: 3.0 g/dL / Pro: x     / ALK PHOS: x     / ALT: x     / AST: x     / GGT: x                   Assessment and Plan:  66 y/o F with PMH GI bleed, anemia, HTN, Type 2 DM, AMADO, obesity, COPD (on 3 L home O2), acute on chronic respiratory failure, CAD, HLD, pancreatitis, s/p Micra pacemaker, ESRD dialysis (M/W/F), s/p AV fistula placement questionable communication deficits at baseline? presented with weakness. The pt stated that she did not feel well the past few days and refused dialysis Friday and today. Reports feeling weak, tired, cough with mucus production, shortness of breath, vomiting. At the time of my evaluation the pt was unable to provide some of her history. I spoke to her daughter, Bel, over the phone who stated that her mother has not been acting like herself. She seems more confused than usual (normally she is A+Ox3) and more fatigued. This has happened in the past when her Hb has been low. She had a colonoscopy within the last 1 year which was negative. She used to get Procrit injections but has not been getting any recently. Additionally, she has been having chest congestion, shortness of breath, and cramping in her hands. The pt's daughter stated the pt has AMADO but she has not been wearing CPAP at [facility]. The pt had an AV fistula placed 3 weeks ago in the left arm.     #Acute on chronic Hypoxic/Hypercapnic Respiratory Failure,  RUL consolidation, likely PNA(Gram Negative Pneumonia), Metabolic Encephalopathy   -on 4L NC (baseline 3L)  -CT head: negative   -CT chest, abdomen/pelvis: as above   -c/w Cefepime + Azithro  -c/w Solumedrol per pulm   -Spiriva, Symbicort, Nebs   -robitussen   -NIPPV QHS   -cultures no growth   -TSH and Vitamin B-12: normal   -aspiration precautions   -pulmonary and ID following    DM2:  -a1c 6.2  -start lantus 5u qhs due to steroids and hyperglycemia  -RAISS    #Stercoral Colitis   -aggressive bowel regimen     #Anemia likely due to chronic disease:  -7.3 today, feeling weak: 1u PRBC today at HD  -on procrit     #ESRD  -on HD   -nephrology following    #VTE Prophylaxis  -SCDs    Dispo:  -likely d/c planing for Monday

## 2023-04-07 NOTE — PROGRESS NOTE ADULT - ASSESSMENT
66 y/o F with h/o GI bleed, anemia, HTN, Type 2 DM, AMADO, obesity, COPD (on 3 L home O2), chronic respiratory failure, CAD, HLD, pancreatitis, s/p Micra pacemaker, ESRD dialysis (M/W/F), AV fistula placement 3 weeks ago, questionable communication deficits at baseline was admitted on 4/3 for increased weakness. The pt stated that she did not feel well the past few days and refused dialysis on the day prior of admission. Reported feeling weak, tired, cough with mucus production, shortness of breath. At the time of my evaluation the pt was unable to provide some of her history. She seems more confused than usual (normally she is A+Ox3) and more fatigued. This has happened in the past when her Hb has been low. She used to get Procrit injections but has not been getting any recently. Additionally, she has been having chest congestion, shortness of breath, and cramping in her hands. In ER she received zosyn and vancomycin IV, then cefepime.     1. Acute on chronic respiratory failure. RUL consolidation likely PNA. COPD. Metabolic encephalopathy. ESRD dialysis (M/W/F).   -low grade fever  -WBC WNL  -on cefepime 2 gm IV qHD and azithromycin 500 mg PO qd # 4  -tolerating abx well so far; no side effects noted  -respiratory care  -f/u cultures  -continue abx coverage  -monitor temps  -f/u cbc and BMP  -Supportive care  2.other issues  -care per med

## 2023-04-07 NOTE — PROGRESS NOTE ADULT - SUBJECTIVE AND OBJECTIVE BOX
Patient is a 67y Female who reports no complaints as new,   refused HD earlier today due to vomiting after last HD - pt states too much fluid removed at last HD   yet pt still feels sob and has crackles on exam    spoke to pt and now agreeable to HD with reduction in UF goal    pt seen on HD       MEDICATIONS  (STANDING):  albuterol/ipratropium for Nebulization 3 milliLiter(s) Nebulizer every 4 hours  artificial  tears Solution 1 Drop(s) Both EYES two times a day  aspirin  chewable 81 milliGRAM(s) Oral daily  atorvastatin 80 milliGRAM(s) Oral at bedtime  azithromycin   Tablet 500 milliGRAM(s) Oral daily  budesonide 160 MICROgram(s)/formoterol 4.5 MICROgram(s) Inhaler 2 Puff(s) Inhalation two times a day  cefepime   IVPB 2000 milliGRAM(s) IV Intermittent <User Schedule>  dextrose 5%. 1000 milliLiter(s) (50 mL/Hr) IV Continuous <Continuous>  dextrose 5%. 1000 milliLiter(s) (100 mL/Hr) IV Continuous <Continuous>  dextrose 50% Injectable 25 Gram(s) IV Push once  dextrose 50% Injectable 12.5 Gram(s) IV Push once  dextrose 50% Injectable 25 Gram(s) IV Push once  epoetin kin-epbx (RETACRIT) Injectable 38975 Unit(s) IV Push <User Schedule>  famotidine    Tablet 20 milliGRAM(s) Oral <User Schedule>  gabapentin 300 milliGRAM(s) Oral <User Schedule>  glucagon  Injectable 1 milliGRAM(s) IntraMuscular once  guaiFENesin Oral Liquid (Sugar-Free) 200 milliGRAM(s) Oral every 6 hours  insulin glargine Injectable (LANTUS) 5 Unit(s) SubCutaneous at bedtime  insulin lispro (ADMELOG) corrective regimen sliding scale   SubCutaneous three times a day before meals  insulin lispro (ADMELOG) corrective regimen sliding scale   SubCutaneous at bedtime  levothyroxine 75 MICROGram(s) Oral daily  methylPREDNISolone sodium succinate Injectable 40 milliGRAM(s) IV Push daily  midodrine. 5 milliGRAM(s) Oral three times a day  polyethylene glycol 3350 17 Gram(s) Oral daily  senna 2 Tablet(s) Oral at bedtime  sertraline 100 milliGRAM(s) Oral daily  tiotropium 2.5 MICROgram(s) Inhaler 2 Puff(s) Inhalation daily      Vital Signs Last 24 Hrs  T(C): 36.7 (07 Apr 2023 17:31), Max: 37.1 (07 Apr 2023 12:56)  T(F): 98.1 (07 Apr 2023 17:31), Max: 98.8 (07 Apr 2023 12:56)  HR: 84 (07 Apr 2023 17:31) (70 - 84)  BP: 155/72 (07 Apr 2023 17:31) (112/59 - 155/72)  BP(mean): --  RR: 18 (07 Apr 2023 17:31) (15 - 18)  SpO2: 97% (07 Apr 2023 17:31) (95% - 100%)    Parameters below as of 07 Apr 2023 17:31  Patient On (Oxygen Delivery Method): nasal cannula  O2 Flow (L/min): 4    I&O's Detail    07 Apr 2023 07:01  -  07 Apr 2023 19:55  --------------------------------------------------------  IN:    PRBCs (Packed Red Blood Cells): 350 mL  Total IN: 350 mL    OUT:  Total OUT: 0 mL    Total NET: 350 mL              PHYSICAL EXAM:    Constitutional: NAD, frail  HEENT:  MM  Resp: bilateral crackles ++   Cardiovascular: S1 and S2   Gastrointestinal: soft  Extremities: +  peripheral edema  Neurological: A/O x 3, no focal deficits  : No Thompson  Skin: No rashes        LABS:                                    7.3    6.69  )-----------( 142      ( 07 Apr 2023 11:57 )             23.7                         7.4    6.67  )-----------( 137      ( 06 Apr 2023 09:01 )             24.5       132    |  99     |  50     ----------------------------<  199       07 Apr 2023 11:57  4.1     |  27     |  4.70     131    |  98     |  34     ----------------------------<  241       06 Apr 2023 09:01  4.5     |  25     |  3.18     131    |  97     |  56     ----------------------------<  256       05 Apr 2023 07:48  5.5     |  20     |  4.90     Ca    8.5        07 Apr 2023 11:57  Ca    8.5        06 Apr 2023 09:01    Phos  4.7       07 Apr 2023 11:57      TPro  x      /  Alb  3.0    /  TBili  x      /        07 Apr 2023 11:57  DBili  x      /  AST  x      /  ALT  x      /  AlkPhos  x        TPro  6.4    /  Alb  2.6    /  TBili  0.5    /        04 Apr 2023 07:59  DBili  x      /  AST  9      /  ALT  10     /  AlkPhos  65

## 2023-04-07 NOTE — PROGRESS NOTE ADULT - SUBJECTIVE AND OBJECTIVE BOX
Date of service: 04-07-23 @ 13:38    Lying in bed in NAD  Has dry cough  No SOB at rest    ROS: no fever or chills; denies dizziness, no HA, no abdominal pain, no diarrhea or constipation; no dysuria, no legs pain, no rashes    MEDICATIONS  (STANDING):  albuterol/ipratropium for Nebulization 3 milliLiter(s) Nebulizer every 4 hours  artificial  tears Solution 1 Drop(s) Both EYES two times a day  aspirin  chewable 81 milliGRAM(s) Oral daily  atorvastatin 80 milliGRAM(s) Oral at bedtime  azithromycin   Tablet 500 milliGRAM(s) Oral daily  budesonide 160 MICROgram(s)/formoterol 4.5 MICROgram(s) Inhaler 2 Puff(s) Inhalation two times a day  cefepime   IVPB 2000 milliGRAM(s) IV Intermittent <User Schedule>  dextrose 5%. 1000 milliLiter(s) (50 mL/Hr) IV Continuous <Continuous>  dextrose 5%. 1000 milliLiter(s) (100 mL/Hr) IV Continuous <Continuous>  dextrose 50% Injectable 25 Gram(s) IV Push once  dextrose 50% Injectable 12.5 Gram(s) IV Push once  dextrose 50% Injectable 25 Gram(s) IV Push once  famotidine    Tablet 20 milliGRAM(s) Oral <User Schedule>  gabapentin 300 milliGRAM(s) Oral <User Schedule>  glucagon  Injectable 1 milliGRAM(s) IntraMuscular once  guaiFENesin Oral Liquid (Sugar-Free) 200 milliGRAM(s) Oral every 6 hours  insulin glargine Injectable (LANTUS) 5 Unit(s) SubCutaneous at bedtime  insulin lispro (ADMELOG) corrective regimen sliding scale   SubCutaneous three times a day before meals  insulin lispro (ADMELOG) corrective regimen sliding scale   SubCutaneous at bedtime  levothyroxine 75 MICROGram(s) Oral daily  methylPREDNISolone sodium succinate Injectable 40 milliGRAM(s) IV Push daily  midodrine. 5 milliGRAM(s) Oral three times a day  polyethylene glycol 3350 17 Gram(s) Oral daily  senna 2 Tablet(s) Oral at bedtime  sertraline 100 milliGRAM(s) Oral daily  tiotropium 2.5 MICROgram(s) Inhaler 2 Puff(s) Inhalation daily    Vital Signs Last 24 Hrs  T(C): 37.1 (07 Apr 2023 12:56), Max: 37.1 (07 Apr 2023 12:56)  T(F): 98.8 (07 Apr 2023 12:56), Max: 98.8 (07 Apr 2023 12:56)  HR: 82 (07 Apr 2023 13:20) (70 - 86)  BP: 126/65 (07 Apr 2023 13:20) (114/50 - 138/71)  BP(mean): --  RR: 18 (07 Apr 2023 13:20) (17 - 18)  SpO2: 95% (07 Apr 2023 09:00) (95% - 100%)    Parameters below as of 07 Apr 2023 13:20  Patient On (Oxygen Delivery Method): nasal cannula  O2 Flow (L/min): 4     Physical exam:    Constitutional:  No acute distress  HEENT: NC/AT, EOMI, PERRLA, conjunctivae clear; ears and nose atraumatic  Neck: supple; thyroid not palpable  Right chest Tessio line in place  Back: no tenderness  Respiratory: b/l Rhonchi   Cardiovascular: S1S2 regular, no murmurs  Abdomen: soft, not tender, not distended, positive BS  Genitourinary: no suprapubic tenderness  Lymphatic: no LN palpable  Musculoskeletal: no muscle tenderness, no joint swelling or tenderness  Extremities: no pedal edema  Neurological/ Psychiatric: AxOx3, judgement and insight impaired; moving all extremities  Skin: no rashes; no palpable lesions    Labs: reviewed                        7.3    6.69  )-----------( 142      ( 07 Apr 2023 11:57 )             23.7     04-07    132<L>  |  99  |  50<H>  ----------------------------<  199<H>  4.1   |  27  |  4.70<H>    Ca    8.5      07 Apr 2023 11:57  Phos  4.7     04-07    TPro  x   /  Alb  3.0<L>  /  TBili  x   /  DBili  x   /  AST  x   /  ALT  x   /  AlkPhos  x   04-07                        7.1    10.20 )-----------( 104      ( 04 Apr 2023 07:59 )             24.4     04-04    135  |  100  |  31<H>  ----------------------------<  111<H>  4.2   |  29  |  3.69<H>    Ca    8.3<L>      04 Apr 2023 07:59    TPro  6.4  /  Alb  2.6<L>  /  TBili  0.5  /  DBili  x   /  AST  9<L>  /  ALT  10<L>  /  AlkPhos  65  04-04     LIVER FUNCTIONS - ( 04 Apr 2023 07:59 )  Alb: 2.6 g/dL / Pro: 6.4 gm/dL / ALK PHOS: 65 U/L / ALT: 10 U/L / AST: 9 U/L / GGT: x           (04-03 @ 10:53)  [facility]    Culture - Blood (collected 03 Apr 2023 11:48)  Source: .Blood None  Preliminary Report (04 Apr 2023 17:01):    No growth to date.    Culture - Blood (collected 03 Apr 2023 11:15)  Source: .Blood None  Preliminary Report (04 Apr 2023 17:01):    No growth to date.    Radiology: all available radiological tests reviewed    - CXR: central venous catheter in SVC, micra pacemaker, RUL PNA  - CT chest: New consolidation is noted in the posterior segment of the right upper lobe. Differential diagnostic consideration includes pneumonia. 0.8 cm solid nodule in RLL unchanged from prior. Trace b/l effusions. Chronic pancreatitis. Increased left lower lobe atelectasis when compared to previous exam.    Advanced directives addressed: full resuscitation

## 2023-04-07 NOTE — PROGRESS NOTE ADULT - ASSESSMENT
67  HTN, DM2, COPD ( on 3 L home o2), CAD, HLD, pancreatitis, ESRD dialysis ( M/W/F), with poor UF/volume removal at HD outpatient at [facility]     SOB/Hypoxia   Fluid overload /PNA   remains overloaded but refused now agreeable to HD       ESRD  -HD TIW, treatment today UF goal 2 lites as pt will only agree with this   -K protocol  -monitor over weekend - fluid restriciton     Anemia - hb trending down - not on VICKI ??  - resume VICKI today at HD  - PRBC if pt agreeable - defer to Medicine team     COPD  -Nightly bipap, baseline o2     Dr Martinez covering weekend   resume care with primary nephro at discharge

## 2023-04-07 NOTE — PROGRESS NOTE ADULT - SUBJECTIVE AND OBJECTIVE BOX
Subjective:    pat better, sitting in bed, no new complaint.    Home Medications:  Albuterol (Eqv-ProAir HFA) 90 mcg/inh inhalation aerosol: 2 puff(s) inhaled every 12 hours as needed for  shortness of breath and/or wheezing (03 Apr 2023 15:12)  Artificial Tears ophthalmic solution: 1 drop(s) in each eye 2 times a day (03 Apr 2023 15:12)  aspirin 81 mg oral tablet, chewable: 1 tab(s) orally once a day (03 Apr 2023 10:55)  Colace 100 mg oral capsule: 2 cap(s) orally once a day (at bedtime), HOLD for diarrhea (03 Apr 2023 10:55)  famotidine 20 mg oral tablet: 1 tab(s) orally Monday, Wednesday, and Friday after dialysis  (03 Apr 2023 10:55)  gabapentin 300 mg oral capsule: 1 cap(s) orally Monday, Wednesday, and Friday at bedtime  (03 Apr 2023 10:55)  HumaLOG 100 units/mL subcutaneous solution: subcutaneous 3 times a day (before meals) per sliding scale:  151-200 = 1 units  201-250 = 2 units  251-300 = 3 units  301-350 = 4 units  351-400 = 6 units  &gt;400 = call MD   (03 Apr 2023 11:07)  Lantus Solostar Pen 100 units/mL subcutaneous solution: 7 unit(s) subcutaneous once a day (at bedtime) (03 Apr 2023 10:55)  levothyroxine 75 mcg (0.075 mg) oral tablet: 1 tab(s) orally once a day (03 Apr 2023 10:55)  midodrine 5 mg oral tablet: 1 tab(s) orally 3 times a day ***hold if SBP &gt; 115*** (03 Apr 2023 15:12)  Robitussin Head and Chest Congestion 100 mg-5 mg/5 mL oral liquid: 10 milliliter(s) orally 3 times a day (03 Apr 2023 15:12)  rosuvastatin 20 mg oral tablet: 1 tab(s) orally once a day (in the evening) (03 Apr 2023 10:55)  Senna 8.6 mg oral tablet: 2 tab(s) orally once a day (at bedtime), hold for Diarrhea (03 Apr 2023 10:55)  sertraline 100 mg oral tablet: 1 tab(s) orally once a day (03 Apr 2023 10:55)  Symbicort 160 mcg-4.5 mcg/inh inhalation aerosol: 2 puff(s) inhaled 2 times a day (03 Apr 2023 10:55)  tiotropium 2.5 mcg/inh inhalation aerosol: 2 puff(s) inhaled once a day (03 Apr 2023 10:55)  Tylenol Extra Strength 500 mg oral tablet: 1 tab(s) orally every 6 hours, As Needed (03 Apr 2023 10:55)    MEDICATIONS  (STANDING):  albuterol/ipratropium for Nebulization 3 milliLiter(s) Nebulizer every 4 hours  artificial  tears Solution 1 Drop(s) Both EYES two times a day  aspirin  chewable 81 milliGRAM(s) Oral daily  atorvastatin 80 milliGRAM(s) Oral at bedtime  azithromycin   Tablet 500 milliGRAM(s) Oral daily  budesonide 160 MICROgram(s)/formoterol 4.5 MICROgram(s) Inhaler 2 Puff(s) Inhalation two times a day  cefepime   IVPB 2000 milliGRAM(s) IV Intermittent <User Schedule>  dextrose 5%. 1000 milliLiter(s) (50 mL/Hr) IV Continuous <Continuous>  dextrose 5%. 1000 milliLiter(s) (100 mL/Hr) IV Continuous <Continuous>  dextrose 50% Injectable 25 Gram(s) IV Push once  dextrose 50% Injectable 12.5 Gram(s) IV Push once  dextrose 50% Injectable 25 Gram(s) IV Push once  famotidine    Tablet 20 milliGRAM(s) Oral <User Schedule>  gabapentin 300 milliGRAM(s) Oral <User Schedule>  glucagon  Injectable 1 milliGRAM(s) IntraMuscular once  guaiFENesin Oral Liquid (Sugar-Free) 200 milliGRAM(s) Oral every 6 hours  insulin glargine Injectable (LANTUS) 5 Unit(s) SubCutaneous at bedtime  insulin lispro (ADMELOG) corrective regimen sliding scale   SubCutaneous three times a day before meals  insulin lispro (ADMELOG) corrective regimen sliding scale   SubCutaneous at bedtime  levothyroxine 75 MICROGram(s) Oral daily  methylPREDNISolone sodium succinate Injectable 40 milliGRAM(s) IV Push daily  midodrine. 5 milliGRAM(s) Oral three times a day  polyethylene glycol 3350 17 Gram(s) Oral daily  senna 2 Tablet(s) Oral at bedtime  sertraline 100 milliGRAM(s) Oral daily  tiotropium 2.5 MICROgram(s) Inhaler 2 Puff(s) Inhalation daily    MEDICATIONS  (PRN):  acetaminophen     Tablet .. 650 milliGRAM(s) Oral every 6 hours PRN Moderate Pain (4 - 6)  albuterol    0.083% 2.5 milliGRAM(s) Nebulizer every 6 hours PRN Shortness of Breath and/or Wheezing  aluminum hydroxide/magnesium hydroxide/simethicone Suspension 30 milliLiter(s) Oral every 4 hours PRN Dyspepsia  bisacodyl Suppository 10 milliGRAM(s) Rectal daily PRN Constipation  dextrose Oral Gel 15 Gram(s) Oral once PRN Blood Glucose LESS THAN 70 milliGRAM(s)/deciliter  lactulose Syrup 20 Gram(s) Oral two times a day PRN constipation  melatonin 3 milliGRAM(s) Oral at bedtime PRN Insomnia  ondansetron Injectable 4 milliGRAM(s) IV Push every 8 hours PRN Nausea and/or Vomiting      Allergies    [place] (Unknown)  No Known Drug Allergies    Intolerances        Vital Signs Last 24 Hrs  T(C): 37.1 (07 Apr 2023 12:56), Max: 37.1 (07 Apr 2023 12:56)  T(F): 98.8 (07 Apr 2023 12:56), Max: 98.8 (07 Apr 2023 12:56)  HR: 76 (07 Apr 2023 12:56) (70 - 86)  BP: 115/62 (07 Apr 2023 12:56) (114/50 - 138/71)  BP(mean): --  RR: 18 (07 Apr 2023 12:56) (17 - 18)  SpO2: 95% (07 Apr 2023 09:00) (95% - 100%)    Parameters below as of 07 Apr 2023 12:56  Patient On (Oxygen Delivery Method): nasal cannula  O2 Flow (L/min): 4        PHYSICAL EXAMINATION:    NECK:  Supple. No lymphadenopathy. Jugular venous pressure not elevated. Carotids equal.   HEART:   The cardiac impulse has a normal quality. Reg., Nl S1 and S2.  There are no murmurs, rubs or gallops noted  CHEST:  Chest crackles to auscultation. Normal respiratory effort.  ABDOMEN:  Soft and nontender.   EXTREMITIES:  There is no edema.       LABS:                        7.3    6.69  )-----------( 142      ( 07 Apr 2023 11:57 )             23.7     04-07    132<L>  |  99  |  50<H>  ----------------------------<  199<H>  4.1   |  27  |  4.70<H>    Ca    8.5      07 Apr 2023 11:57  Phos  4.7     04-07    TPro  x   /  Alb  3.0<L>  /  TBili  x   /  DBili  x   /  AST  x   /  ALT  x   /  AlkPhos  x   04-07

## 2023-04-08 LAB
CULTURE RESULTS: SIGNIFICANT CHANGE UP
CULTURE RESULTS: SIGNIFICANT CHANGE UP
GLUCOSE BLDC GLUCOMTR-MCNC: 155 MG/DL — HIGH (ref 70–99)
GLUCOSE BLDC GLUCOMTR-MCNC: 163 MG/DL — HIGH (ref 70–99)
GLUCOSE BLDC GLUCOMTR-MCNC: 281 MG/DL — HIGH (ref 70–99)
GLUCOSE BLDC GLUCOMTR-MCNC: 373 MG/DL — HIGH (ref 70–99)
SPECIMEN SOURCE: SIGNIFICANT CHANGE UP
SPECIMEN SOURCE: SIGNIFICANT CHANGE UP

## 2023-04-08 PROCEDURE — 99232 SBSQ HOSP IP/OBS MODERATE 35: CPT

## 2023-04-08 RX ADMIN — Medication 200 MILLIGRAM(S): at 17:28

## 2023-04-08 RX ADMIN — Medication 1 DROP(S): at 22:08

## 2023-04-08 RX ADMIN — Medication 3 MILLILITER(S): at 08:15

## 2023-04-08 RX ADMIN — Medication 3 MILLILITER(S): at 00:45

## 2023-04-08 RX ADMIN — SERTRALINE 100 MILLIGRAM(S): 25 TABLET, FILM COATED ORAL at 09:47

## 2023-04-08 RX ADMIN — AZITHROMYCIN 500 MILLIGRAM(S): 500 TABLET, FILM COATED ORAL at 09:47

## 2023-04-08 RX ADMIN — BUDESONIDE AND FORMOTEROL FUMARATE DIHYDRATE 2 PUFF(S): 160; 4.5 AEROSOL RESPIRATORY (INHALATION) at 20:39

## 2023-04-08 RX ADMIN — Medication 3 MILLILITER(S): at 11:43

## 2023-04-08 RX ADMIN — Medication 75 MICROGRAM(S): at 06:05

## 2023-04-08 RX ADMIN — Medication 10: at 17:29

## 2023-04-08 RX ADMIN — Medication 2: at 08:40

## 2023-04-08 RX ADMIN — Medication 2: at 22:06

## 2023-04-08 RX ADMIN — Medication 200 MILLIGRAM(S): at 06:05

## 2023-04-08 RX ADMIN — SENNA PLUS 2 TABLET(S): 8.6 TABLET ORAL at 22:06

## 2023-04-08 RX ADMIN — POLYETHYLENE GLYCOL 3350 17 GRAM(S): 17 POWDER, FOR SOLUTION ORAL at 09:48

## 2023-04-08 RX ADMIN — Medication 1 DROP(S): at 09:46

## 2023-04-08 RX ADMIN — Medication 2: at 12:11

## 2023-04-08 RX ADMIN — INSULIN GLARGINE 5 UNIT(S): 100 INJECTION, SOLUTION SUBCUTANEOUS at 22:07

## 2023-04-08 RX ADMIN — Medication 3 MILLILITER(S): at 04:44

## 2023-04-08 RX ADMIN — TIOTROPIUM BROMIDE 2 PUFF(S): 18 CAPSULE ORAL; RESPIRATORY (INHALATION) at 11:43

## 2023-04-08 RX ADMIN — Medication 3 MILLILITER(S): at 20:38

## 2023-04-08 RX ADMIN — BUDESONIDE AND FORMOTEROL FUMARATE DIHYDRATE 2 PUFF(S): 160; 4.5 AEROSOL RESPIRATORY (INHALATION) at 08:16

## 2023-04-08 RX ADMIN — Medication 200 MILLIGRAM(S): at 23:29

## 2023-04-08 RX ADMIN — Medication 40 MILLIGRAM(S): at 09:48

## 2023-04-08 RX ADMIN — ATORVASTATIN CALCIUM 80 MILLIGRAM(S): 80 TABLET, FILM COATED ORAL at 22:06

## 2023-04-08 RX ADMIN — Medication 81 MILLIGRAM(S): at 09:47

## 2023-04-08 RX ADMIN — Medication 200 MILLIGRAM(S): at 12:11

## 2023-04-08 NOTE — PROGRESS NOTE ADULT - ASSESSMENT
68 y/o F presented with weakness     PROBLEMS:    AC hypoxaemic & hypercapneic Respiratory failurew  Multifactorial: RUL consolidation/HCAP  AMADO not on CPAP, likely obesity hypoventilation syndrome,  ESRD missed session of dialysis/increaesd pleural effusions and fluid overload  Macrocytic anemia   Acute metabolic encephalopathy likely secondary to infectious etiology (HCAP) vs. metabolic vs. neurologic   Vomiting possibly secondary to enteritis vs. GERD vs. uremia? 3. Thrombocytopenia   Hyperglycemia secondary to Type 2 DM  Hypoalbuminemia   History of GI bleed, anemia, HTN, Type 2 DM, AMADO, obesity, COPD (on 3 L home O2), acute on chronic respiratory failure, CAD, HLD, pancreatitis, s/p Micra pacemaker, ESRD dialysis (M/W/F), questionable communication deficits at baseline?    PLAN:    Pulmonary better-used pap qhs-on 4lpm nc sat 96%-titrate  BIPAP 16/12-RR 15- fio2-ABG - ( 04 Apr 2023 18:20 )pH, Arterial: 7.20  pH, Blood: x     /  pCO2: 67    /  pO2: 84    / HCO3: 26    / Base Excess: -3.3  /  SaO2: 98.0    Change to AVAPS-30/15/8/400/40%-ABG in am-ABG - ( 05 Apr 2023 08:26 ) pH: 7.17  /  pCO2: 53    /  pO2: 84    / HCO3: 19    / Base Excess: -9.5  /  SaO2: 98      IV Cefepime   IV solu-medrols 40mg qdaily  S/p HD today, c/w M/W/F schedule   D/W hospitalist/d/w staff  DVT prophylasix

## 2023-04-08 NOTE — PROGRESS NOTE ADULT - SUBJECTIVE AND OBJECTIVE BOX
CC: Weakness       Subjective: Seen and evaluated at bedside. On NIPPV. Lethargic in AM but mentation more improved in the PM.     04/05: Seen and evaluated at bedside. Mentation improving. Off NIPPV (changed to AVAPS). ABG this AM reviewed. Had BM, per RN. HD today.   4/6:  Alert, having a lot of congestion, requesting a "Syrup" to help.   Discussed plan of care.  No fever, chills, + cough, + congestion.  4/7: Sitting in chair, less congested today, overall slowly improving.  Blood sugars elevated.  Awaiting HD today.  4/8: More alert, less weak, no fever, chills, n, v, + cough.  Family at bedside.    All other systems and founds to be negative with exception of what has been described above.     Vital Signs Last 24 Hrs  T(C): 36.8 (08 Apr 2023 08:35), Max: 36.8 (07 Apr 2023 17:00)  T(F): 98.2 (08 Apr 2023 08:35), Max: 98.2 (07 Apr 2023 17:00)  HR: 74 (08 Apr 2023 08:35) (74 - 84)  BP: 112/66 (08 Apr 2023 08:35) (112/59 - 155/72)  BP(mean): --  RR: 18 (08 Apr 2023 08:35) (15 - 18)  SpO2: 98% (08 Apr 2023 08:35) (96% - 100%)    Parameters below as of 08 Apr 2023 08:35  Patient On (Oxygen Delivery Method): nasal cannula  O2 Flow (L/min): 3        GEN: NAD   HEENT: EOMI, normal hearing, moist mucous membranes  NECK : Soft and supple, no JVD  LUNG: +diffuse rhonchi,   CVS: S1S2+, RRR, no M/G/R, +right tessio  GI: BS+, soft, NT/ND, no guarding, no rebound  EXTREMITIES: No peripheral edema, +left AVF  VASCULAR: 2+ peripheral pulses  NEURO: AAOx2, grossly non-focal   MSK: 5/5 strength b/l upper and lower extremities  SKIN: No rashes      MEDICATIONS  (STANDING):  albuterol/ipratropium for Nebulization 3 milliLiter(s) Nebulizer every 4 hours  artificial  tears Solution 1 Drop(s) Both EYES two times a day  aspirin  chewable 81 milliGRAM(s) Oral daily  atorvastatin 80 milliGRAM(s) Oral at bedtime  budesonide 160 MICROgram(s)/formoterol 4.5 MICROgram(s) Inhaler 2 Puff(s) Inhalation two times a day  cefepime   IVPB 2000 milliGRAM(s) IV Intermittent <User Schedule>  dextrose 5%. 1000 milliLiter(s) (100 mL/Hr) IV Continuous <Continuous>  dextrose 5%. 1000 milliLiter(s) (50 mL/Hr) IV Continuous <Continuous>  dextrose 50% Injectable 25 Gram(s) IV Push once  dextrose 50% Injectable 12.5 Gram(s) IV Push once  dextrose 50% Injectable 25 Gram(s) IV Push once  epoetin kin-epbx (RETACRIT) Injectable 89434 Unit(s) IV Push <User Schedule>  famotidine    Tablet 20 milliGRAM(s) Oral <User Schedule>  gabapentin 300 milliGRAM(s) Oral <User Schedule>  glucagon  Injectable 1 milliGRAM(s) IntraMuscular once  guaiFENesin Oral Liquid (Sugar-Free) 200 milliGRAM(s) Oral every 6 hours  insulin glargine Injectable (LANTUS) 5 Unit(s) SubCutaneous at bedtime  insulin lispro (ADMELOG) corrective regimen sliding scale   SubCutaneous three times a day before meals  insulin lispro (ADMELOG) corrective regimen sliding scale   SubCutaneous at bedtime  levothyroxine 75 MICROGram(s) Oral daily  methylPREDNISolone sodium succinate Injectable 40 milliGRAM(s) IV Push daily  midodrine. 5 milliGRAM(s) Oral three times a day  polyethylene glycol 3350 17 Gram(s) Oral daily  senna 2 Tablet(s) Oral at bedtime  sertraline 100 milliGRAM(s) Oral daily  tiotropium 2.5 MICROgram(s) Inhaler 2 Puff(s) Inhalation daily    MEDICATIONS  (PRN):  acetaminophen     Tablet .. 650 milliGRAM(s) Oral every 6 hours PRN Moderate Pain (4 - 6)  albuterol    0.083% 2.5 milliGRAM(s) Nebulizer every 6 hours PRN Shortness of Breath and/or Wheezing  aluminum hydroxide/magnesium hydroxide/simethicone Suspension 30 milliLiter(s) Oral every 4 hours PRN Dyspepsia  bisacodyl Suppository 10 milliGRAM(s) Rectal daily PRN Constipation  dextrose Oral Gel 15 Gram(s) Oral once PRN Blood Glucose LESS THAN 70 milliGRAM(s)/deciliter  lactulose Syrup 20 Gram(s) Oral two times a day PRN constipation  melatonin 3 milliGRAM(s) Oral at bedtime PRN Insomnia  ondansetron Injectable 4 milliGRAM(s) IV Push every 8 hours PRN Nausea and/or Vomiting                                  7.3    6.69  )-----------( 142      ( 07 Apr 2023 11:57 )             23.7     04-07    132<L>  |  99  |  50<H>  ----------------------------<  199<H>  4.1   |  27  |  4.70<H>    Ca    8.5      07 Apr 2023 11:57  Phos  4.7     04-07    TPro  x   /  Alb  3.0<L>  /  TBili  x   /  DBili  x   /  AST  x   /  ALT  x   /  AlkPhos  x   04-07    CAPILLARY BLOOD GLUCOSE      POCT Blood Glucose.: 155 mg/dL (08 Apr 2023 12:05)  POCT Blood Glucose.: 163 mg/dL (08 Apr 2023 08:34)  POCT Blood Glucose.: 222 mg/dL (07 Apr 2023 21:25)  POCT Blood Glucose.: 204 mg/dL (07 Apr 2023 17:31)    LIVER FUNCTIONS - ( 07 Apr 2023 11:57 )  Alb: 3.0 g/dL / Pro: x     / ALK PHOS: x     / ALT: x     / AST: x     / GGT: x                 Assessment and Plan:  68 y/o F with PMH GI bleed, anemia, HTN, Type 2 DM, AMADO, obesity, COPD (on 3 L home O2), acute on chronic respiratory failure, CAD, HLD, pancreatitis, s/p Micra pacemaker, ESRD dialysis (M/W/F), s/p AV fistula placement questionable communication deficits at baseline? presented with weakness. The pt stated that she did not feel well the past few days and refused dialysis Friday and today. Reports feeling weak, tired, cough with mucus production, shortness of breath, vomiting. At the time of my evaluation the pt was unable to provide some of her history. I spoke to her daughter, Bel, over the phone who stated that her mother has not been acting like herself. She seems more confused than usual (normally she is A+Ox3) and more fatigued. This has happened in the past when her Hb has been low. She had a colonoscopy within the last 1 year which was negative. She used to get Procrit injections but has not been getting any recently. Additionally, she has been having chest congestion, shortness of breath, and cramping in her hands. The pt's daughter stated the pt has AMADO but she has not been wearing CPAP at [facility]. The pt had an AV fistula placed 3 weeks ago in the left arm.     #Acute on chronic Hypoxic/Hypercapnic Respiratory Failure,  RUL consolidation, likely PNA(Gram Negative Pneumonia), Metabolic Encephalopathy   -on 4L NC (baseline 3L)  -CT head: negative   -CT chest, abdomen/pelvis: as above   -c/w Cefepime + Azithro  -c/w Solumedrol per pulm, down to qd  -Spiriva, Symbicort, Nebs   -robitussen   -NIPPV QHS   -cultures no growth   -TSH and Vitamin B-12: normal   -aspiration precautions   -pulmonary and ID following    DM2:  -a1c 6.2  -started lantus 5u qhs due to steroids and hyperglycemia  -RAISS    #Stercoral Colitis   -aggressive bowel regimen     #Anemia likely due to chronic disease:  -s/p 1u PRBC t4/7  -on procrit     #ESRD  -on HD   -nephrology following    #VTE Prophylaxis  -SCDs    Dispo:  -likely d/c planing for Monday

## 2023-04-08 NOTE — PROGRESS NOTE ADULT - SUBJECTIVE AND OBJECTIVE BOX
Date of service: 04-08-23 @ 09:29    Lying in bed in NAD  SOB is improving  Has dry cough  No fever    ROS: no fever or chills; denies dizziness, no HA, no abdominal pain, no diarrhea or constipation; no dysuria, no legs pain, no rashes    MEDICATIONS  (STANDING):  albuterol/ipratropium for Nebulization 3 milliLiter(s) Nebulizer every 4 hours  artificial  tears Solution 1 Drop(s) Both EYES two times a day  aspirin  chewable 81 milliGRAM(s) Oral daily  atorvastatin 80 milliGRAM(s) Oral at bedtime  azithromycin   Tablet 500 milliGRAM(s) Oral daily  budesonide 160 MICROgram(s)/formoterol 4.5 MICROgram(s) Inhaler 2 Puff(s) Inhalation two times a day  cefepime   IVPB 2000 milliGRAM(s) IV Intermittent <User Schedule>  dextrose 5%. 1000 milliLiter(s) (100 mL/Hr) IV Continuous <Continuous>  dextrose 5%. 1000 milliLiter(s) (50 mL/Hr) IV Continuous <Continuous>  dextrose 50% Injectable 25 Gram(s) IV Push once  dextrose 50% Injectable 25 Gram(s) IV Push once  dextrose 50% Injectable 12.5 Gram(s) IV Push once  epoetin kin-epbx (RETACRIT) Injectable 38429 Unit(s) IV Push <User Schedule>  famotidine    Tablet 20 milliGRAM(s) Oral <User Schedule>  gabapentin 300 milliGRAM(s) Oral <User Schedule>  glucagon  Injectable 1 milliGRAM(s) IntraMuscular once  guaiFENesin Oral Liquid (Sugar-Free) 200 milliGRAM(s) Oral every 6 hours  insulin glargine Injectable (LANTUS) 5 Unit(s) SubCutaneous at bedtime  insulin lispro (ADMELOG) corrective regimen sliding scale   SubCutaneous three times a day before meals  insulin lispro (ADMELOG) corrective regimen sliding scale   SubCutaneous at bedtime  levothyroxine 75 MICROGram(s) Oral daily  methylPREDNISolone sodium succinate Injectable 40 milliGRAM(s) IV Push daily  midodrine. 5 milliGRAM(s) Oral three times a day  polyethylene glycol 3350 17 Gram(s) Oral daily  senna 2 Tablet(s) Oral at bedtime  sertraline 100 milliGRAM(s) Oral daily  tiotropium 2.5 MICROgram(s) Inhaler 2 Puff(s) Inhalation daily    Vital Signs Last 24 Hrs  T(C): 36.8 (08 Apr 2023 08:35), Max: 37.1 (07 Apr 2023 12:56)  T(F): 98.2 (08 Apr 2023 08:35), Max: 98.8 (07 Apr 2023 12:56)  HR: 74 (08 Apr 2023 08:35) (74 - 84)  BP: 112/66 (08 Apr 2023 08:35) (112/59 - 155/72)  BP(mean): --  RR: 18 (08 Apr 2023 08:35) (15 - 18)  SpO2: 98% (08 Apr 2023 08:35) (96% - 100%)    Parameters below as of 08 Apr 2023 08:35  Patient On (Oxygen Delivery Method): nasal cannula  O2 Flow (L/min): 3       Physical exam:    Constitutional:  No acute distress  HEENT: NC/AT, EOMI, PERRLA, conjunctivae clear; ears and nose atraumatic  Neck: supple; thyroid not palpable  Right chest Tessio line in place  Back: no tenderness  Respiratory: b/l Rhonchi   Cardiovascular: S1S2 regular, no murmurs  Abdomen: soft, not tender, not distended, positive BS  Genitourinary: no suprapubic tenderness  Lymphatic: no LN palpable  Musculoskeletal: no muscle tenderness, no joint swelling or tenderness  Extremities: no pedal edema  Neurological/ Psychiatric: AxOx3, judgement and insight impaired; moving all extremities  Skin: no rashes; no palpable lesions    Labs: reviewed                        7.3    6.69  )-----------( 142      ( 07 Apr 2023 11:57 )             23.7     04-07    132<L>  |  99  |  50<H>  ----------------------------<  199<H>  4.1   |  27  |  4.70<H>    Ca    8.5      07 Apr 2023 11:57  Phos  4.7     04-07    TPro  x   /  Alb  3.0<L>  /  TBili  x   /  DBili  x   /  AST  x   /  ALT  x   /  AlkPhos  x   04-07                        7.1    10.20 )-----------( 104      ( 04 Apr 2023 07:59 )             24.4     04-04    135  |  100  |  31<H>  ----------------------------<  111<H>  4.2   |  29  |  3.69<H>    Ca    8.3<L>      04 Apr 2023 07:59    TPro  6.4  /  Alb  2.6<L>  /  TBili  0.5  /  DBili  x   /  AST  9<L>  /  ALT  10<L>  /  AlkPhos  65  04-04     LIVER FUNCTIONS - ( 04 Apr 2023 07:59 )  Alb: 2.6 g/dL / Pro: 6.4 gm/dL / ALK PHOS: 65 U/L / ALT: 10 U/L / AST: 9 U/L / GGT: x           (04-03 @ 10:53)  [facility]    Culture - Blood (collected 03 Apr 2023 11:48)  Source: .Blood None  Preliminary Report (04 Apr 2023 17:01):    No growth to date.    Culture - Blood (collected 03 Apr 2023 11:15)  Source: .Blood None  Preliminary Report (04 Apr 2023 17:01):    No growth to date.    Radiology: all available radiological tests reviewed    - CXR: central venous catheter in SVC, micra pacemaker, RUL PNA  - CT chest: New consolidation is noted in the posterior segment of the right upper lobe. Differential diagnostic consideration includes pneumonia. 0.8 cm solid nodule in RLL unchanged from prior. Trace b/l effusions. Chronic pancreatitis. Increased left lower lobe atelectasis when compared to previous exam.    Advanced directives addressed: full resuscitation

## 2023-04-08 NOTE — PROGRESS NOTE ADULT - SUBJECTIVE AND OBJECTIVE BOX
Subjective:    pat better, no respiratory distress, pat lying in bed.    Home Medications:  Albuterol (Eqv-ProAir HFA) 90 mcg/inh inhalation aerosol: 2 puff(s) inhaled every 12 hours as needed for  shortness of breath and/or wheezing (03 Apr 2023 15:12)  Artificial Tears ophthalmic solution: 1 drop(s) in each eye 2 times a day (03 Apr 2023 15:12)  aspirin 81 mg oral tablet, chewable: 1 tab(s) orally once a day (03 Apr 2023 10:55)  Colace 100 mg oral capsule: 2 cap(s) orally once a day (at bedtime), HOLD for diarrhea (03 Apr 2023 10:55)  famotidine 20 mg oral tablet: 1 tab(s) orally Monday, Wednesday, and Friday after dialysis  (03 Apr 2023 10:55)  gabapentin 300 mg oral capsule: 1 cap(s) orally Monday, Wednesday, and Friday at bedtime  (03 Apr 2023 10:55)  HumaLOG 100 units/mL subcutaneous solution: subcutaneous 3 times a day (before meals) per sliding scale:  151-200 = 1 units  201-250 = 2 units  251-300 = 3 units  301-350 = 4 units  351-400 = 6 units  &gt;400 = call MD   (03 Apr 2023 11:07)  Lantus Solostar Pen 100 units/mL subcutaneous solution: 7 unit(s) subcutaneous once a day (at bedtime) (03 Apr 2023 10:55)  levothyroxine 75 mcg (0.075 mg) oral tablet: 1 tab(s) orally once a day (03 Apr 2023 10:55)  midodrine 5 mg oral tablet: 1 tab(s) orally 3 times a day ***hold if SBP &gt; 115*** (03 Apr 2023 15:12)  Robitussin Head and Chest Congestion 100 mg-5 mg/5 mL oral liquid: 10 milliliter(s) orally 3 times a day (03 Apr 2023 15:12)  rosuvastatin 20 mg oral tablet: 1 tab(s) orally once a day (in the evening) (03 Apr 2023 10:55)  Senna 8.6 mg oral tablet: 2 tab(s) orally once a day (at bedtime), hold for Diarrhea (03 Apr 2023 10:55)  sertraline 100 mg oral tablet: 1 tab(s) orally once a day (03 Apr 2023 10:55)  Symbicort 160 mcg-4.5 mcg/inh inhalation aerosol: 2 puff(s) inhaled 2 times a day (03 Apr 2023 10:55)  tiotropium 2.5 mcg/inh inhalation aerosol: 2 puff(s) inhaled once a day (03 Apr 2023 10:55)  Tylenol Extra Strength 500 mg oral tablet: 1 tab(s) orally every 6 hours, As Needed (03 Apr 2023 10:55)    MEDICATIONS  (STANDING):  albuterol/ipratropium for Nebulization 3 milliLiter(s) Nebulizer every 4 hours  artificial  tears Solution 1 Drop(s) Both EYES two times a day  aspirin  chewable 81 milliGRAM(s) Oral daily  atorvastatin 80 milliGRAM(s) Oral at bedtime  budesonide 160 MICROgram(s)/formoterol 4.5 MICROgram(s) Inhaler 2 Puff(s) Inhalation two times a day  cefepime   IVPB 2000 milliGRAM(s) IV Intermittent <User Schedule>  dextrose 5%. 1000 milliLiter(s) (50 mL/Hr) IV Continuous <Continuous>  dextrose 5%. 1000 milliLiter(s) (100 mL/Hr) IV Continuous <Continuous>  dextrose 50% Injectable 25 Gram(s) IV Push once  dextrose 50% Injectable 12.5 Gram(s) IV Push once  dextrose 50% Injectable 25 Gram(s) IV Push once  epoetin kin-epbx (RETACRIT) Injectable 70307 Unit(s) IV Push <User Schedule>  famotidine    Tablet 20 milliGRAM(s) Oral <User Schedule>  gabapentin 300 milliGRAM(s) Oral <User Schedule>  glucagon  Injectable 1 milliGRAM(s) IntraMuscular once  guaiFENesin Oral Liquid (Sugar-Free) 200 milliGRAM(s) Oral every 6 hours  insulin glargine Injectable (LANTUS) 5 Unit(s) SubCutaneous at bedtime  insulin lispro (ADMELOG) corrective regimen sliding scale   SubCutaneous three times a day before meals  insulin lispro (ADMELOG) corrective regimen sliding scale   SubCutaneous at bedtime  levothyroxine 75 MICROGram(s) Oral daily  methylPREDNISolone sodium succinate Injectable 40 milliGRAM(s) IV Push daily  midodrine. 5 milliGRAM(s) Oral three times a day  polyethylene glycol 3350 17 Gram(s) Oral daily  senna 2 Tablet(s) Oral at bedtime  sertraline 100 milliGRAM(s) Oral daily  tiotropium 2.5 MICROgram(s) Inhaler 2 Puff(s) Inhalation daily    MEDICATIONS  (PRN):  acetaminophen     Tablet .. 650 milliGRAM(s) Oral every 6 hours PRN Moderate Pain (4 - 6)  albuterol    0.083% 2.5 milliGRAM(s) Nebulizer every 6 hours PRN Shortness of Breath and/or Wheezing  aluminum hydroxide/magnesium hydroxide/simethicone Suspension 30 milliLiter(s) Oral every 4 hours PRN Dyspepsia  bisacodyl Suppository 10 milliGRAM(s) Rectal daily PRN Constipation  dextrose Oral Gel 15 Gram(s) Oral once PRN Blood Glucose LESS THAN 70 milliGRAM(s)/deciliter  lactulose Syrup 20 Gram(s) Oral two times a day PRN constipation  melatonin 3 milliGRAM(s) Oral at bedtime PRN Insomnia  ondansetron Injectable 4 milliGRAM(s) IV Push every 8 hours PRN Nausea and/or Vomiting      Allergies    [place] (Unknown)  No Known Drug Allergies    Intolerances        Vital Signs Last 24 Hrs  T(C): 36.8 (08 Apr 2023 08:35), Max: 36.8 (07 Apr 2023 17:00)  T(F): 98.2 (08 Apr 2023 08:35), Max: 98.2 (07 Apr 2023 17:00)  HR: 74 (08 Apr 2023 08:35) (74 - 84)  BP: 112/66 (08 Apr 2023 08:35) (112/59 - 155/72)  BP(mean): --  RR: 18 (08 Apr 2023 08:35) (15 - 18)  SpO2: 98% (08 Apr 2023 08:35) (96% - 100%)    Parameters below as of 08 Apr 2023 08:35  Patient On (Oxygen Delivery Method): nasal cannula  O2 Flow (L/min): 3        PHYSICAL EXAMINATION:    NECK:  Supple. No lymphadenopathy. Jugular venous pressure not elevated. Carotids equal.   HEART:   The cardiac impulse has a normal quality. Reg., Nl S1 and S2.  There are no murmurs, rubs or gallops noted  CHEST:  Chest crackles to auscultation. Normal respiratory effort.  ABDOMEN:  Soft and nontender.   EXTREMITIES:  There is no edema.       LABS:                        7.3    6.69  )-----------( 142      ( 07 Apr 2023 11:57 )             23.7     04-07    132<L>  |  99  |  50<H>  ----------------------------<  199<H>  4.1   |  27  |  4.70<H>    Ca    8.5      07 Apr 2023 11:57  Phos  4.7     04-07    TPro  x   /  Alb  3.0<L>  /  TBili  x   /  DBili  x   /  AST  x   /  ALT  x   /  AlkPhos  x   04-07

## 2023-04-08 NOTE — PROGRESS NOTE ADULT - ASSESSMENT
66 y/o F with h/o GI bleed, anemia, HTN, Type 2 DM, AMADO, obesity, COPD (on 3 L home O2), chronic respiratory failure, CAD, HLD, pancreatitis, s/p Micra pacemaker, ESRD dialysis (M/W/F), AV fistula placement 3 weeks ago, questionable communication deficits at baseline was admitted on 4/3 for increased weakness. The pt stated that she did not feel well the past few days and refused dialysis on the day prior of admission. Reported feeling weak, tired, cough with mucus production, shortness of breath. At the time of my evaluation the pt was unable to provide some of her history. She seems more confused than usual (normally she is A+Ox3) and more fatigued. This has happened in the past when her Hb has been low. She used to get Procrit injections but has not been getting any recently. Additionally, she has been having chest congestion, shortness of breath, and cramping in her hands. In ER she received zosyn and vancomycin IV, then cefepime.     1. Acute on chronic respiratory failure improving. RUL consolidation likely PNA. COPD. Metabolic encephalopathy. ESRD dialysis (M/W/F).   -low grade fever  -respiratory improved  -on cefepime 2 gm IV qHD and azithromycin 500 mg PO qd # 5  -tolerating abx well so far; no side effects noted  -respiratory care  -f/u cultures  -complete therapy with azithromcyin  -continue abx coverage with cefepime  -monitor temps  -f/u cbc and BMP  -Supportive care  2.other issues  -care per med

## 2023-04-09 LAB
GLUCOSE BLDC GLUCOMTR-MCNC: 150 MG/DL — HIGH (ref 70–99)
GLUCOSE BLDC GLUCOMTR-MCNC: 249 MG/DL — HIGH (ref 70–99)
GLUCOSE BLDC GLUCOMTR-MCNC: 274 MG/DL — HIGH (ref 70–99)
GLUCOSE BLDC GLUCOMTR-MCNC: 317 MG/DL — HIGH (ref 70–99)
TROPONIN I, HIGH SENSITIVITY RESULT: 39.72 NG/L — SIGNIFICANT CHANGE UP
TROPONIN I, HIGH SENSITIVITY RESULT: 41.23 NG/L — SIGNIFICANT CHANGE UP

## 2023-04-09 PROCEDURE — 99232 SBSQ HOSP IP/OBS MODERATE 35: CPT

## 2023-04-09 PROCEDURE — 93010 ELECTROCARDIOGRAM REPORT: CPT

## 2023-04-09 PROCEDURE — 71045 X-RAY EXAM CHEST 1 VIEW: CPT | Mod: 26

## 2023-04-09 RX ADMIN — Medication 8: at 17:22

## 2023-04-09 RX ADMIN — BUDESONIDE AND FORMOTEROL FUMARATE DIHYDRATE 2 PUFF(S): 160; 4.5 AEROSOL RESPIRATORY (INHALATION) at 21:10

## 2023-04-09 RX ADMIN — Medication 6: at 11:56

## 2023-04-09 RX ADMIN — Medication 200 MILLIGRAM(S): at 06:31

## 2023-04-09 RX ADMIN — Medication 200 MILLIGRAM(S): at 23:00

## 2023-04-09 RX ADMIN — Medication 3 MILLILITER(S): at 12:56

## 2023-04-09 RX ADMIN — Medication 3 MILLILITER(S): at 21:10

## 2023-04-09 RX ADMIN — Medication 10 MILLIGRAM(S): at 09:51

## 2023-04-09 RX ADMIN — Medication 3 MILLIGRAM(S): at 21:12

## 2023-04-09 RX ADMIN — Medication 1 DROP(S): at 09:48

## 2023-04-09 RX ADMIN — INSULIN GLARGINE 5 UNIT(S): 100 INJECTION, SOLUTION SUBCUTANEOUS at 21:08

## 2023-04-09 RX ADMIN — TIOTROPIUM BROMIDE 2 PUFF(S): 18 CAPSULE ORAL; RESPIRATORY (INHALATION) at 12:57

## 2023-04-09 RX ADMIN — Medication 81 MILLIGRAM(S): at 09:44

## 2023-04-09 RX ADMIN — Medication 3 MILLILITER(S): at 16:11

## 2023-04-09 RX ADMIN — ATORVASTATIN CALCIUM 80 MILLIGRAM(S): 80 TABLET, FILM COATED ORAL at 21:11

## 2023-04-09 RX ADMIN — Medication 200 MILLIGRAM(S): at 11:55

## 2023-04-09 RX ADMIN — Medication 40 MILLIGRAM(S): at 13:16

## 2023-04-09 RX ADMIN — Medication 30 MILLILITER(S): at 17:24

## 2023-04-09 RX ADMIN — Medication 75 MICROGRAM(S): at 06:31

## 2023-04-09 RX ADMIN — SENNA PLUS 2 TABLET(S): 8.6 TABLET ORAL at 21:11

## 2023-04-09 RX ADMIN — BUDESONIDE AND FORMOTEROL FUMARATE DIHYDRATE 2 PUFF(S): 160; 4.5 AEROSOL RESPIRATORY (INHALATION) at 12:56

## 2023-04-09 RX ADMIN — SERTRALINE 100 MILLIGRAM(S): 25 TABLET, FILM COATED ORAL at 09:45

## 2023-04-09 RX ADMIN — MIDODRINE HYDROCHLORIDE 5 MILLIGRAM(S): 2.5 TABLET ORAL at 06:32

## 2023-04-09 RX ADMIN — Medication 1 DROP(S): at 21:12

## 2023-04-09 RX ADMIN — Medication 200 MILLIGRAM(S): at 17:21

## 2023-04-09 RX ADMIN — Medication 3 MILLILITER(S): at 08:47

## 2023-04-09 RX ADMIN — Medication 3 MILLILITER(S): at 00:02

## 2023-04-09 NOTE — PROGRESS NOTE ADULT - SUBJECTIVE AND OBJECTIVE BOX
Patient is a 67y Female who reports no complaints as new,   refused HD earlier today due to vomiting after last HD - pt states too much fluid removed at last HD   yet pt still feels sob and has crackles on exam    spoke to pt and now agreeable to HD with reduction in UF goal    pt seen on HD    4/9  comfortable, no sob, for HD in am       MEDICATIONS  (STANDING):  albuterol/ipratropium for Nebulization 3 milliLiter(s) Nebulizer every 4 hours  artificial  tears Solution 1 Drop(s) Both EYES two times a day  aspirin  chewable 81 milliGRAM(s) Oral daily  atorvastatin 80 milliGRAM(s) Oral at bedtime  azithromycin   Tablet 500 milliGRAM(s) Oral daily  budesonide 160 MICROgram(s)/formoterol 4.5 MICROgram(s) Inhaler 2 Puff(s) Inhalation two times a day  cefepime   IVPB 2000 milliGRAM(s) IV Intermittent <User Schedule>  dextrose 5%. 1000 milliLiter(s) (50 mL/Hr) IV Continuous <Continuous>  dextrose 5%. 1000 milliLiter(s) (100 mL/Hr) IV Continuous <Continuous>  dextrose 50% Injectable 25 Gram(s) IV Push once  dextrose 50% Injectable 12.5 Gram(s) IV Push once  dextrose 50% Injectable 25 Gram(s) IV Push once  epoetin kin-epbx (RETACRIT) Injectable 42677 Unit(s) IV Push <User Schedule>  famotidine    Tablet 20 milliGRAM(s) Oral <User Schedule>  gabapentin 300 milliGRAM(s) Oral <User Schedule>  glucagon  Injectable 1 milliGRAM(s) IntraMuscular once  guaiFENesin Oral Liquid (Sugar-Free) 200 milliGRAM(s) Oral every 6 hours  insulin glargine Injectable (LANTUS) 5 Unit(s) SubCutaneous at bedtime  insulin lispro (ADMELOG) corrective regimen sliding scale   SubCutaneous three times a day before meals  insulin lispro (ADMELOG) corrective regimen sliding scale   SubCutaneous at bedtime  levothyroxine 75 MICROGram(s) Oral daily  methylPREDNISolone sodium succinate Injectable 40 milliGRAM(s) IV Push daily  midodrine. 5 milliGRAM(s) Oral three times a day  polyethylene glycol 3350 17 Gram(s) Oral daily  senna 2 Tablet(s) Oral at bedtime  sertraline 100 milliGRAM(s) Oral daily  tiotropium 2.5 MICROgram(s) Inhaler 2 Puff(s) Inhalation daily    Vital Signs Last 24 Hrs  T(C): 36.7 (09 Apr 2023 15:46), Max: 36.8 (08 Apr 2023 20:56)  T(F): 98.1 (09 Apr 2023 15:46), Max: 98.2 (08 Apr 2023 20:56)  HR: 75 (09 Apr 2023 15:46) (64 - 85)  BP: 119/64 (09 Apr 2023 15:46) (119/64 - 152/67)  BP(mean): --  RR: 18 (09 Apr 2023 15:46) (18 - 19)  SpO2: 98% (09 Apr 2023 15:46) (96% - 100%)    Parameters below as of 09 Apr 2023 15:46  Patient On (Oxygen Delivery Method): nasal cannula  O2 Flow (L/min): 3        PHYSICAL EXAM:    Constitutional: NAD, frail  HEENT:  MM  Resp: fine crackles  Cardiovascular: S1 and S2   Gastrointestinal: soft  Extremities: +  peripheral edema  Neurological: A/O x 3, no focal deficits  : No Thompson  Skin: No rashes        LABS:                                    7.3    6.69  )-----------( 142      ( 07 Apr 2023 11:57 )             23.7                         7.4    6.67  )-----------( 137      ( 06 Apr 2023 09:01 )             24.5       132    |  99     |  50     ----------------------------<  199       07 Apr 2023 11:57  4.1     |  27     |  4.70     131    |  98     |  34     ----------------------------<  241       06 Apr 2023 09:01  4.5     |  25     |  3.18     131    |  97     |  56     ----------------------------<  256       05 Apr 2023 07:48  5.5     |  20     |  4.90     Ca    8.5        07 Apr 2023 11:57  Ca    8.5        06 Apr 2023 09:01    Phos  4.7       07 Apr 2023 11:57      TPro  x      /  Alb  3.0    /  TBili  x      /        07 Apr 2023 11:57  DBili  x      /  AST  x      /  ALT  x      /  AlkPhos  x        TPro  6.4    /  Alb  2.6    /  TBili  0.5    /        04 Apr 2023 07:59  DBili  x      /  AST  9      /  ALT  10     /  AlkPhos  65

## 2023-04-09 NOTE — PROGRESS NOTE ADULT - ASSESSMENT
67  HTN, DM2, COPD ( on 3 L home o2), CAD, HLD, pancreatitis, ESRD dialysis ( M/W/F), with poor UF/volume removal at HD outpatient at [facility]     SOB/Hypoxia   Fluid overload /PNA   remains overloaded but refused now agreeable to HD       ESRD  -HD TIW, treatment today UF goal 2 lites as pt will only agree with this   -K protocol  -monitor over weekend - fluid restriciton     Anemia - hb trending down - not on VICKI ??  - resume VICKI today at HD  - PRBC if pt agreeable - defer to Medicine team     COPD  -Nightly bipap, baseline o2     Dr Martinez covering weekend   resume care with primary nephro at discharge     4/9  comfortable  complying w restrictions  HD in am  on EPO for anemia

## 2023-04-09 NOTE — PROVIDER CONTACT NOTE (CHANGE IN STATUS NOTIFICATION) - SITUATION
Pt. c/o new onset midsternal chest pain. Described as tightness, no other symptoms. VSS, see flowsheet
Pt has normally been AOx4, but pt appears more confused. Pt AOX2-3. Pt states she heard people speaking about her and stating people were calling her the B word.

## 2023-04-09 NOTE — PROGRESS NOTE ADULT - SUBJECTIVE AND OBJECTIVE BOX
CC: Weakness       Subjective: Seen and evaluated at bedside. On NIPPV. Lethargic in AM but mentation more improved in the PM.     04/05: Seen and evaluated at bedside. Mentation improving. Off NIPPV (changed to AVAPS). ABG this AM reviewed. Had BM, per RN. HD today.   4/6:  Alert, having a lot of congestion, requesting a "Syrup" to help.   Discussed plan of care.  No fever, chills, + cough, + congestion.  4/7: Sitting in chair, less congested today, overall slowly improving.  Blood sugars elevated.  Awaiting HD today.  4/8: More alert, less weak, no fever, chills, n, v, + cough.  Family at bedside.  4/9: c/o chest tightness    All other systems and founds to be negative with exception of what has been described above.       PHYSICAL EXAM:    Daily     Daily     Vital Signs Last 24 Hrs  T(C): 36.6 (09 Apr 2023 08:29), Max: 36.8 (08 Apr 2023 20:56)  T(F): 97.8 (09 Apr 2023 08:29), Max: 98.2 (08 Apr 2023 20:56)  HR: 74 (09 Apr 2023 10:23) (64 - 85)  BP: 119/64 (09 Apr 2023 10:23) (119/64 - 152/67)  BP(mean): --  RR: 18 (09 Apr 2023 10:23) (17 - 19)  SpO2: 96% (09 Apr 2023 10:23) (95% - 100%)    Constitutional: Weak and ill appearing  HEENT: Atraumatic, JAGJIT,   Respiratory: Breath Sounds normal, no rhonchi/wheeze  Cardiovascular: N S1S2;   Gastrointestinal: Abdomen soft, non tender, Bowel Sounds present  Extremities: No edema, peripheral pulses present  Neurological: AAO x 3, no gross focal motor deficits  Skin: Non cellulitic, no rash, ulcers  Lymph Nodes: No lymphadenopathy noted  Back: No CVA tenderness   Musculoskeletal: non tender  Breasts: Deferred  Genitourinary: deferred  Rectal: Deferred    All Labs/EKG/Radiology/Meds reviewed by me    MEDICATIONS  (STANDING):  albuterol/ipratropium for Nebulization 3 milliLiter(s) Nebulizer every 4 hours  artificial  tears Solution 1 Drop(s) Both EYES two times a day  aspirin  chewable 81 milliGRAM(s) Oral daily  atorvastatin 80 milliGRAM(s) Oral at bedtime  budesonide 160 MICROgram(s)/formoterol 4.5 MICROgram(s) Inhaler 2 Puff(s) Inhalation two times a day  cefepime   IVPB 2000 milliGRAM(s) IV Intermittent <User Schedule>  dextrose 5%. 1000 milliLiter(s) (100 mL/Hr) IV Continuous <Continuous>  dextrose 5%. 1000 milliLiter(s) (50 mL/Hr) IV Continuous <Continuous>  dextrose 50% Injectable 25 Gram(s) IV Push once  dextrose 50% Injectable 12.5 Gram(s) IV Push once  dextrose 50% Injectable 25 Gram(s) IV Push once  epoetin kin-epbx (RETACRIT) Injectable 36174 Unit(s) IV Push <User Schedule>  famotidine    Tablet 20 milliGRAM(s) Oral <User Schedule>  gabapentin 300 milliGRAM(s) Oral <User Schedule>  glucagon  Injectable 1 milliGRAM(s) IntraMuscular once  guaiFENesin Oral Liquid (Sugar-Free) 200 milliGRAM(s) Oral every 6 hours  insulin glargine Injectable (LANTUS) 5 Unit(s) SubCutaneous at bedtime  insulin lispro (ADMELOG) corrective regimen sliding scale   SubCutaneous three times a day before meals  insulin lispro (ADMELOG) corrective regimen sliding scale   SubCutaneous at bedtime  levothyroxine 75 MICROGram(s) Oral daily  methylPREDNISolone sodium succinate Injectable 40 milliGRAM(s) IV Push daily  midodrine. 5 milliGRAM(s) Oral three times a day  polyethylene glycol 3350 17 Gram(s) Oral daily  senna 2 Tablet(s) Oral at bedtime  sertraline 100 milliGRAM(s) Oral daily  tiotropium 2.5 MICROgram(s) Inhaler 2 Puff(s) Inhalation daily    MEDICATIONS  (PRN):  acetaminophen     Tablet .. 650 milliGRAM(s) Oral every 6 hours PRN Moderate Pain (4 - 6)  albuterol    0.083% 2.5 milliGRAM(s) Nebulizer every 6 hours PRN Shortness of Breath and/or Wheezing  aluminum hydroxide/magnesium hydroxide/simethicone Suspension 30 milliLiter(s) Oral every 4 hours PRN Dyspepsia  bisacodyl Suppository 10 milliGRAM(s) Rectal daily PRN Constipation  dextrose Oral Gel 15 Gram(s) Oral once PRN Blood Glucose LESS THAN 70 milliGRAM(s)/deciliter  lactulose Syrup 20 Gram(s) Oral two times a day PRN constipation  melatonin 3 milliGRAM(s) Oral at bedtime PRN Insomnia  ondansetron Injectable 4 milliGRAM(s) IV Push every 8 hours PRN Nausea and/or Vomiting        Assessment and Plan:  68 y/o F with PMH GI bleed, anemia, HTN, Type 2 DM, AMADO, obesity, COPD (on 3 L home O2), acute on chronic respiratory failure, CAD, HLD, pancreatitis, s/p Micra pacemaker, ESRD dialysis (M/W/F), s/p AV fistula placement questionable communication deficits at baseline? presented with weakness. The pt stated that she did not feel well the past few days and refused dialysis Friday and today. Reports feeling weak, tired, cough with mucus production, shortness of breath, vomiting. At the time of my evaluation the pt was unable to provide some of her history. I spoke to her daughter, Bel, over the phone who stated that her mother has not been acting like herself. She seems more confused than usual (normally she is A+Ox3) and more fatigued. This has happened in the past when her Hb has been low. She had a colonoscopy within the last 1 year which was negative. She used to get Procrit injections but has not been getting any recently. Additionally, she has been having chest congestion, shortness of breath, and cramping in her hands. The pt's daughter stated the pt has AMADO but she has not been wearing CPAP at [facility]. The pt had an AV fistula placed 3 weeks ago in the left arm.     #Acute on chronic Hypoxic/Hypercapnic Respiratory Failure,  RUL consolidation, likely PNA(Gram Negative Pneumonia), Metabolic Encephalopathy   -on 4L NC (baseline 3L)  -CT head: negative   -CT chest, abdomen/pelvis: as above   -c/w Cefepime + Azithro  -c/w Solumedrol per pulm, down to qd  -Spiriva, Symbicort, Nebs   -robitussen   -NIPPV QHS   -cultures no growth   -TSH and Vitamin B-12: normal   -aspiration precautions   -pulmonary and ID following  cont iv steroids    # c/o Chest Tightness:   Hx of CAD with stents  stat EKG done; normal  serial trops  monitor closely    DM2:  -a1c 6.2  -started lantus 5u qhs due to steroids and hyperglycemia  -ISS    #Stercoral Colitis   -aggressive bowel regimen     #Anemia likely due to chronic disease:  -s/p 1u PRBC t4/7  -on procrit     #ESRD  -on HD   -nephrology following    #VTE Prophylaxis  -SCDs    Dispo:  -likely d/c planing for Monday / Tuesday        POC discussed with pt, team, Dr. Sheppard

## 2023-04-09 NOTE — PROGRESS NOTE ADULT - SUBJECTIVE AND OBJECTIVE BOX
Date of service: 04-09-23 @ 09:07    Sitting in bed in NAD  SOB is much improved  Has dry cough    ROS: no fever or chills; denies dizziness, no HA, no abdominal pain, no diarrhea or constipation; no dysuria, no legs pain, no rashes    MEDICATIONS  (STANDING):  albuterol/ipratropium for Nebulization 3 milliLiter(s) Nebulizer every 4 hours  artificial  tears Solution 1 Drop(s) Both EYES two times a day  aspirin  chewable 81 milliGRAM(s) Oral daily  atorvastatin 80 milliGRAM(s) Oral at bedtime  budesonide 160 MICROgram(s)/formoterol 4.5 MICROgram(s) Inhaler 2 Puff(s) Inhalation two times a day  cefepime   IVPB 2000 milliGRAM(s) IV Intermittent <User Schedule>  dextrose 5%. 1000 milliLiter(s) (100 mL/Hr) IV Continuous <Continuous>  dextrose 5%. 1000 milliLiter(s) (50 mL/Hr) IV Continuous <Continuous>  dextrose 50% Injectable 25 Gram(s) IV Push once  dextrose 50% Injectable 12.5 Gram(s) IV Push once  dextrose 50% Injectable 25 Gram(s) IV Push once  epoetin kin-epbx (RETACRIT) Injectable 74195 Unit(s) IV Push <User Schedule>  famotidine    Tablet 20 milliGRAM(s) Oral <User Schedule>  gabapentin 300 milliGRAM(s) Oral <User Schedule>  glucagon  Injectable 1 milliGRAM(s) IntraMuscular once  guaiFENesin Oral Liquid (Sugar-Free) 200 milliGRAM(s) Oral every 6 hours  insulin glargine Injectable (LANTUS) 5 Unit(s) SubCutaneous at bedtime  insulin lispro (ADMELOG) corrective regimen sliding scale   SubCutaneous three times a day before meals  insulin lispro (ADMELOG) corrective regimen sliding scale   SubCutaneous at bedtime  levothyroxine 75 MICROGram(s) Oral daily  methylPREDNISolone sodium succinate Injectable 40 milliGRAM(s) IV Push daily  midodrine. 5 milliGRAM(s) Oral three times a day  polyethylene glycol 3350 17 Gram(s) Oral daily  senna 2 Tablet(s) Oral at bedtime  sertraline 100 milliGRAM(s) Oral daily  tiotropium 2.5 MICROgram(s) Inhaler 2 Puff(s) Inhalation daily    Vital Signs Last 24 Hrs  T(C): 36.6 (09 Apr 2023 08:29), Max: 36.8 (08 Apr 2023 20:56)  T(F): 97.8 (09 Apr 2023 08:29), Max: 98.2 (08 Apr 2023 20:56)  HR: 64 (09 Apr 2023 08:29) (64 - 85)  BP: 126/61 (09 Apr 2023 08:29) (126/61 - 152/67)  BP(mean): --  RR: 18 (09 Apr 2023 08:29) (17 - 19)  SpO2: 99% (09 Apr 2023 08:29) (95% - 100%)    Parameters below as of 09 Apr 2023 08:29  Patient On (Oxygen Delivery Method): nasal cannula  O2 Flow (L/min): 3     Physical exam:    Constitutional:  No acute distress  HEENT: NC/AT, EOMI, PERRLA, conjunctivae clear; ears and nose atraumatic  Neck: supple; thyroid not palpable  Right chest Tessio line in place  Back: no tenderness  Respiratory: b/l Rhonchi   Cardiovascular: S1S2 regular, no murmurs  Abdomen: soft, not tender, not distended, positive BS  Genitourinary: no suprapubic tenderness  Lymphatic: no LN palpable  Musculoskeletal: no muscle tenderness, no joint swelling or tenderness  Extremities: no pedal edema  Neurological/ Psychiatric: AxOx3, judgement and insight impaired; moving all extremities  Skin: no rashes; no palpable lesions    Labs: reviewed                        7.3    6.69  )-----------( 142      ( 07 Apr 2023 11:57 )             23.7     04-07    132<L>  |  99  |  50<H>  ----------------------------<  199<H>  4.1   |  27  |  4.70<H>    Ca    8.5      07 Apr 2023 11:57  Phos  4.7     04-07    TPro  x   /  Alb  3.0<L>  /  TBili  x   /  DBili  x   /  AST  x   /  ALT  x   /  AlkPhos  x   04-07                        7.3    6.69  )-----------( 142      ( 07 Apr 2023 11:57 )             23.7     04-07    132<L>  |  99  |  50<H>  ----------------------------<  199<H>  4.1   |  27  |  4.70<H>    Ca    8.5      07 Apr 2023 11:57  Phos  4.7     04-07    TPro  x   /  Alb  3.0<L>  /  TBili  x   /  DBili  x   /  AST  x   /  ALT  x   /  AlkPhos  x   04-07                        7.1    10.20 )-----------( 104      ( 04 Apr 2023 07:59 )             24.4     04-04    135  |  100  |  31<H>  ----------------------------<  111<H>  4.2   |  29  |  3.69<H>    Ca    8.3<L>      04 Apr 2023 07:59    TPro  6.4  /  Alb  2.6<L>  /  TBili  0.5  /  DBili  x   /  AST  9<L>  /  ALT  10<L>  /  AlkPhos  65  04-04     LIVER FUNCTIONS - ( 04 Apr 2023 07:59 )  Alb: 2.6 g/dL / Pro: 6.4 gm/dL / ALK PHOS: 65 U/L / ALT: 10 U/L / AST: 9 U/L / GGT: x           (04-03 @ 10:53)  [facility]    Culture - Blood (collected 03 Apr 2023 11:48)  Source: .Blood None  Preliminary Report (04 Apr 2023 17:01):    No growth to date.    Culture - Blood (collected 03 Apr 2023 11:15)  Source: .Blood None  Preliminary Report (04 Apr 2023 17:01):    No growth to date.    Radiology: all available radiological tests reviewed    - CXR: central venous catheter in SVC, micra pacemaker, RUL PNA  - CT chest: New consolidation is noted in the posterior segment of the right upper lobe. Differential diagnostic consideration includes pneumonia. 0.8 cm solid nodule in RLL unchanged from prior. Trace b/l effusions. Chronic pancreatitis. Increased left lower lobe atelectasis when compared to previous exam.    Advanced directives addressed: full resuscitation

## 2023-04-09 NOTE — PROGRESS NOTE ADULT - SUBJECTIVE AND OBJECTIVE BOX
back pain general Subjective:    pat better, sitting in chair, no respiratory distress.    Home Medications:  Albuterol (Eqv-ProAir HFA) 90 mcg/inh inhalation aerosol: 2 puff(s) inhaled every 12 hours as needed for  shortness of breath and/or wheezing (03 Apr 2023 15:12)  Artificial Tears ophthalmic solution: 1 drop(s) in each eye 2 times a day (03 Apr 2023 15:12)  aspirin 81 mg oral tablet, chewable: 1 tab(s) orally once a day (03 Apr 2023 10:55)  Colace 100 mg oral capsule: 2 cap(s) orally once a day (at bedtime), HOLD for diarrhea (03 Apr 2023 10:55)  famotidine 20 mg oral tablet: 1 tab(s) orally Monday, Wednesday, and Friday after dialysis  (03 Apr 2023 10:55)  gabapentin 300 mg oral capsule: 1 cap(s) orally Monday, Wednesday, and Friday at bedtime  (03 Apr 2023 10:55)  HumaLOG 100 units/mL subcutaneous solution: subcutaneous 3 times a day (before meals) per sliding scale:  151-200 = 1 units  201-250 = 2 units  251-300 = 3 units  301-350 = 4 units  351-400 = 6 units  &gt;400 = call MD   (03 Apr 2023 11:07)  Lantus Solostar Pen 100 units/mL subcutaneous solution: 7 unit(s) subcutaneous once a day (at bedtime) (03 Apr 2023 10:55)  levothyroxine 75 mcg (0.075 mg) oral tablet: 1 tab(s) orally once a day (03 Apr 2023 10:55)  midodrine 5 mg oral tablet: 1 tab(s) orally 3 times a day ***hold if SBP &gt; 115*** (03 Apr 2023 15:12)  Robitussin Head and Chest Congestion 100 mg-5 mg/5 mL oral liquid: 10 milliliter(s) orally 3 times a day (03 Apr 2023 15:12)  rosuvastatin 20 mg oral tablet: 1 tab(s) orally once a day (in the evening) (03 Apr 2023 10:55)  Senna 8.6 mg oral tablet: 2 tab(s) orally once a day (at bedtime), hold for Diarrhea (03 Apr 2023 10:55)  sertraline 100 mg oral tablet: 1 tab(s) orally once a day (03 Apr 2023 10:55)  Symbicort 160 mcg-4.5 mcg/inh inhalation aerosol: 2 puff(s) inhaled 2 times a day (03 Apr 2023 10:55)  tiotropium 2.5 mcg/inh inhalation aerosol: 2 puff(s) inhaled once a day (03 Apr 2023 10:55)  Tylenol Extra Strength 500 mg oral tablet: 1 tab(s) orally every 6 hours, As Needed (03 Apr 2023 10:55)    MEDICATIONS  (STANDING):  albuterol/ipratropium for Nebulization 3 milliLiter(s) Nebulizer every 4 hours  artificial  tears Solution 1 Drop(s) Both EYES two times a day  aspirin  chewable 81 milliGRAM(s) Oral daily  atorvastatin 80 milliGRAM(s) Oral at bedtime  budesonide 160 MICROgram(s)/formoterol 4.5 MICROgram(s) Inhaler 2 Puff(s) Inhalation two times a day  cefepime   IVPB 2000 milliGRAM(s) IV Intermittent <User Schedule>  dextrose 5%. 1000 milliLiter(s) (100 mL/Hr) IV Continuous <Continuous>  dextrose 5%. 1000 milliLiter(s) (50 mL/Hr) IV Continuous <Continuous>  dextrose 50% Injectable 25 Gram(s) IV Push once  dextrose 50% Injectable 12.5 Gram(s) IV Push once  dextrose 50% Injectable 25 Gram(s) IV Push once  epoetin kin-epbx (RETACRIT) Injectable 25670 Unit(s) IV Push <User Schedule>  famotidine    Tablet 20 milliGRAM(s) Oral <User Schedule>  gabapentin 300 milliGRAM(s) Oral <User Schedule>  glucagon  Injectable 1 milliGRAM(s) IntraMuscular once  guaiFENesin Oral Liquid (Sugar-Free) 200 milliGRAM(s) Oral every 6 hours  insulin glargine Injectable (LANTUS) 5 Unit(s) SubCutaneous at bedtime  insulin lispro (ADMELOG) corrective regimen sliding scale   SubCutaneous three times a day before meals  insulin lispro (ADMELOG) corrective regimen sliding scale   SubCutaneous at bedtime  levothyroxine 75 MICROGram(s) Oral daily  methylPREDNISolone sodium succinate Injectable 40 milliGRAM(s) IV Push daily  midodrine. 5 milliGRAM(s) Oral three times a day  polyethylene glycol 3350 17 Gram(s) Oral daily  senna 2 Tablet(s) Oral at bedtime  sertraline 100 milliGRAM(s) Oral daily  tiotropium 2.5 MICROgram(s) Inhaler 2 Puff(s) Inhalation daily    MEDICATIONS  (PRN):  acetaminophen     Tablet .. 650 milliGRAM(s) Oral every 6 hours PRN Moderate Pain (4 - 6)  albuterol    0.083% 2.5 milliGRAM(s) Nebulizer every 6 hours PRN Shortness of Breath and/or Wheezing  aluminum hydroxide/magnesium hydroxide/simethicone Suspension 30 milliLiter(s) Oral every 4 hours PRN Dyspepsia  bisacodyl Suppository 10 milliGRAM(s) Rectal daily PRN Constipation  dextrose Oral Gel 15 Gram(s) Oral once PRN Blood Glucose LESS THAN 70 milliGRAM(s)/deciliter  lactulose Syrup 20 Gram(s) Oral two times a day PRN constipation  melatonin 3 milliGRAM(s) Oral at bedtime PRN Insomnia  ondansetron Injectable 4 milliGRAM(s) IV Push every 8 hours PRN Nausea and/or Vomiting      Allergies    [place] (Unknown)  No Known Drug Allergies    Intolerances        Vital Signs Last 24 Hrs  T(C): 36.6 (09 Apr 2023 08:29), Max: 36.8 (08 Apr 2023 20:56)  T(F): 97.8 (09 Apr 2023 08:29), Max: 98.2 (08 Apr 2023 20:56)  HR: 74 (09 Apr 2023 10:23) (64 - 85)  BP: 119/64 (09 Apr 2023 10:23) (119/64 - 152/67)  BP(mean): --  RR: 18 (09 Apr 2023 10:23) (17 - 19)  SpO2: 96% (09 Apr 2023 10:23) (95% - 100%)    Parameters below as of 09 Apr 2023 10:23  Patient On (Oxygen Delivery Method): nasal cannula  O2 Flow (L/min): 3        PHYSICAL EXAMINATION:    NECK:  Supple. No lymphadenopathy. Jugular venous pressure not elevated. Carotids equal.   HEART:   The cardiac impulse has a normal quality. Reg., Nl S1 and S2.  There are no murmurs, rubs or gallops noted  CHEST:  Chest crackles to auscultation. Normal respiratory effort.  ABDOMEN:  Soft and nontender.   EXTREMITIES:  There is no edema.       LABS:

## 2023-04-09 NOTE — PROGRESS NOTE ADULT - ASSESSMENT
66 y/o F with h/o GI bleed, anemia, HTN, Type 2 DM, AMADO, obesity, COPD (on 3 L home O2), chronic respiratory failure, CAD, HLD, pancreatitis, s/p Micra pacemaker, ESRD dialysis (M/W/F), AV fistula placement 3 weeks ago, questionable communication deficits at baseline was admitted on 4/3 for increased weakness. The pt stated that she did not feel well the past few days and refused dialysis on the day prior of admission. Reported feeling weak, tired, cough with mucus production, shortness of breath. At the time of my evaluation the pt was unable to provide some of her history. She seems more confused than usual (normally she is A+Ox3) and more fatigued. This has happened in the past when her Hb has been low. She used to get Procrit injections but has not been getting any recently. Additionally, she has been having chest congestion, shortness of breath, and cramping in her hands. In ER she received zosyn and vancomycin IV, then cefepime.     1. Acute on chronic respiratory failure improving. RUL consolidation likely PNA. COPD. Metabolic encephalopathy. ESRD dialysis (M/W/F).   -low grade fever  -respiratory improved  -s/p azithromycin 500 mg PO qd # 5  -on cefepime 2 gm IV qHD # 6  -tolerating abx well so far; no side effects noted  -respiratory care  -f/u cultures  -continue abx coverage with cefepime for one more day  -monitor temps  -f/u cbc and BMP  -Supportive care  2.other issues  -care per med

## 2023-04-09 NOTE — PROGRESS NOTE ADULT - ASSESSMENT
68 y/o F presented with weakness     PROBLEMS:    AC hypoxaemic & hypercapneic Respiratory failurew  Multifactorial: RUL consolidation/HCAP  AMADO not on CPAP, likely obesity hypoventilation syndrome,  ESRD missed session of dialysis/increaesd pleural effusions and fluid overload  Macrocytic anemia   Acute metabolic encephalopathy likely secondary to infectious etiology (HCAP) vs. metabolic vs. neurologic   Vomiting possibly secondary to enteritis vs. GERD vs. uremia? 3. Thrombocytopenia   Hyperglycemia secondary to Type 2 DM  Hypoalbuminemia   History of GI bleed, anemia, HTN, Type 2 DM, AMADO, obesity, COPD (on 3 L home O2), acute on chronic respiratory failure, CAD, HLD, pancreatitis, s/p Micra pacemaker, ESRD dialysis (M/W/F), questionable communication deficits at baseline?    PLAN:    Pulmonary better-used pap qhs-on 4lpm nc sat 96%-titrate- CXR today-decd planning  BIPAP 16/12-RR 15- fio2-ABG - ( 04 Apr 2023 18:20 )pH, Arterial: 7.20  pH, Blood: x     /  pCO2: 67    /  pO2: 84    / HCO3: 26    / Base Excess: -3.3  /  SaO2: 98.0    Change to AVAPS-30/15/8/400/40%-ABG in am-ABG - ( 05 Apr 2023 08:26 ) pH: 7.17  /  pCO2: 53    /  pO2: 84    / HCO3: 19    / Base Excess: -9.5  /  SaO2: 98      IV Cefepime   IV solu-medrols 40mg qdaily  S/p HD today, c/w M/W/F schedule   D/W hospitalist/d/w staff  DVT prophylasix

## 2023-04-10 LAB
ALBUMIN SERPL ELPH-MCNC: 2.9 G/DL — LOW (ref 3.3–5)
ANION GAP SERPL CALC-SCNC: 4 MMOL/L — LOW (ref 5–17)
BUN SERPL-MCNC: 58 MG/DL — HIGH (ref 7–23)
CALCIUM SERPL-MCNC: 8.5 MG/DL — SIGNIFICANT CHANGE UP (ref 8.5–10.1)
CHLORIDE SERPL-SCNC: 101 MMOL/L — SIGNIFICANT CHANGE UP (ref 96–108)
CO2 SERPL-SCNC: 30 MMOL/L — SIGNIFICANT CHANGE UP (ref 22–31)
CREAT SERPL-MCNC: 4.82 MG/DL — HIGH (ref 0.5–1.3)
EGFR: 9 ML/MIN/1.73M2 — LOW
GLUCOSE BLDC GLUCOMTR-MCNC: 119 MG/DL — HIGH (ref 70–99)
GLUCOSE BLDC GLUCOMTR-MCNC: 168 MG/DL — HIGH (ref 70–99)
GLUCOSE BLDC GLUCOMTR-MCNC: 336 MG/DL — HIGH (ref 70–99)
GLUCOSE BLDC GLUCOMTR-MCNC: 377 MG/DL — HIGH (ref 70–99)
GLUCOSE SERPL-MCNC: 155 MG/DL — HIGH (ref 70–99)
HCT VFR BLD CALC: 26.6 % — LOW (ref 34.5–45)
HGB BLD-MCNC: 8.3 G/DL — LOW (ref 11.5–15.5)
MCHC RBC-ENTMCNC: 31.1 PG — SIGNIFICANT CHANGE UP (ref 27–34)
MCHC RBC-ENTMCNC: 31.2 GM/DL — LOW (ref 32–36)
MCV RBC AUTO: 99.6 FL — SIGNIFICANT CHANGE UP (ref 80–100)
PHOSPHATE SERPL-MCNC: 3.3 MG/DL — SIGNIFICANT CHANGE UP (ref 2.5–4.5)
PLATELET # BLD AUTO: 118 K/UL — LOW (ref 150–400)
POTASSIUM SERPL-MCNC: 3.8 MMOL/L — SIGNIFICANT CHANGE UP (ref 3.5–5.3)
POTASSIUM SERPL-SCNC: 3.8 MMOL/L — SIGNIFICANT CHANGE UP (ref 3.5–5.3)
RBC # BLD: 2.67 M/UL — LOW (ref 3.8–5.2)
RBC # FLD: 17.4 % — HIGH (ref 10.3–14.5)
SODIUM SERPL-SCNC: 135 MMOL/L — SIGNIFICANT CHANGE UP (ref 135–145)
WBC # BLD: 10.35 K/UL — SIGNIFICANT CHANGE UP (ref 3.8–10.5)
WBC # FLD AUTO: 10.35 K/UL — SIGNIFICANT CHANGE UP (ref 3.8–10.5)

## 2023-04-10 PROCEDURE — 99232 SBSQ HOSP IP/OBS MODERATE 35: CPT

## 2023-04-10 RX ADMIN — Medication 3 MILLILITER(S): at 00:04

## 2023-04-10 RX ADMIN — CEFEPIME 100 MILLIGRAM(S): 1 INJECTION, POWDER, FOR SOLUTION INTRAMUSCULAR; INTRAVENOUS at 17:31

## 2023-04-10 RX ADMIN — Medication 4: at 22:45

## 2023-04-10 RX ADMIN — BUDESONIDE AND FORMOTEROL FUMARATE DIHYDRATE 2 PUFF(S): 160; 4.5 AEROSOL RESPIRATORY (INHALATION) at 21:25

## 2023-04-10 RX ADMIN — Medication 1 DROP(S): at 12:33

## 2023-04-10 RX ADMIN — Medication 75 MICROGRAM(S): at 06:33

## 2023-04-10 RX ADMIN — Medication 200 MILLIGRAM(S): at 12:33

## 2023-04-10 RX ADMIN — TIOTROPIUM BROMIDE 2 PUFF(S): 18 CAPSULE ORAL; RESPIRATORY (INHALATION) at 15:06

## 2023-04-10 RX ADMIN — Medication 3 MILLILITER(S): at 04:24

## 2023-04-10 RX ADMIN — Medication 10: at 17:31

## 2023-04-10 RX ADMIN — ATORVASTATIN CALCIUM 80 MILLIGRAM(S): 80 TABLET, FILM COATED ORAL at 22:21

## 2023-04-10 RX ADMIN — FAMOTIDINE 20 MILLIGRAM(S): 10 INJECTION INTRAVENOUS at 12:33

## 2023-04-10 RX ADMIN — Medication 3 MILLILITER(S): at 15:06

## 2023-04-10 RX ADMIN — Medication 2: at 06:37

## 2023-04-10 RX ADMIN — POLYETHYLENE GLYCOL 3350 17 GRAM(S): 17 POWDER, FOR SOLUTION ORAL at 12:33

## 2023-04-10 RX ADMIN — Medication 200 MILLIGRAM(S): at 22:57

## 2023-04-10 RX ADMIN — SERTRALINE 100 MILLIGRAM(S): 25 TABLET, FILM COATED ORAL at 12:33

## 2023-04-10 RX ADMIN — Medication 200 MILLIGRAM(S): at 17:31

## 2023-04-10 RX ADMIN — Medication 1 DROP(S): at 22:21

## 2023-04-10 RX ADMIN — GABAPENTIN 300 MILLIGRAM(S): 400 CAPSULE ORAL at 22:21

## 2023-04-10 RX ADMIN — MIDODRINE HYDROCHLORIDE 5 MILLIGRAM(S): 2.5 TABLET ORAL at 06:33

## 2023-04-10 RX ADMIN — Medication 81 MILLIGRAM(S): at 12:33

## 2023-04-10 RX ADMIN — Medication 3 MILLILITER(S): at 21:24

## 2023-04-10 RX ADMIN — Medication 40 MILLIGRAM(S): at 12:33

## 2023-04-10 RX ADMIN — BUDESONIDE AND FORMOTEROL FUMARATE DIHYDRATE 2 PUFF(S): 160; 4.5 AEROSOL RESPIRATORY (INHALATION) at 15:06

## 2023-04-10 RX ADMIN — ERYTHROPOIETIN 10000 UNIT(S): 10000 INJECTION, SOLUTION INTRAVENOUS; SUBCUTANEOUS at 08:41

## 2023-04-10 RX ADMIN — INSULIN GLARGINE 5 UNIT(S): 100 INJECTION, SOLUTION SUBCUTANEOUS at 22:45

## 2023-04-10 NOTE — PROGRESS NOTE ADULT - SUBJECTIVE AND OBJECTIVE BOX
Subjective:    pat seen during HD, no respiratory distress.    Home Medications:  Albuterol (Eqv-ProAir HFA) 90 mcg/inh inhalation aerosol: 2 puff(s) inhaled every 12 hours as needed for  shortness of breath and/or wheezing (03 Apr 2023 15:12)  Artificial Tears ophthalmic solution: 1 drop(s) in each eye 2 times a day (03 Apr 2023 15:12)  aspirin 81 mg oral tablet, chewable: 1 tab(s) orally once a day (03 Apr 2023 10:55)  Colace 100 mg oral capsule: 2 cap(s) orally once a day (at bedtime), HOLD for diarrhea (03 Apr 2023 10:55)  famotidine 20 mg oral tablet: 1 tab(s) orally Monday, Wednesday, and Friday after dialysis  (03 Apr 2023 10:55)  gabapentin 300 mg oral capsule: 1 cap(s) orally Monday, Wednesday, and Friday at bedtime  (03 Apr 2023 10:55)  HumaLOG 100 units/mL subcutaneous solution: subcutaneous 3 times a day (before meals) per sliding scale:  151-200 = 1 units  201-250 = 2 units  251-300 = 3 units  301-350 = 4 units  351-400 = 6 units  &gt;400 = call MD   (03 Apr 2023 11:07)  Lantus Solostar Pen 100 units/mL subcutaneous solution: 7 unit(s) subcutaneous once a day (at bedtime) (03 Apr 2023 10:55)  levothyroxine 75 mcg (0.075 mg) oral tablet: 1 tab(s) orally once a day (03 Apr 2023 10:55)  midodrine 5 mg oral tablet: 1 tab(s) orally 3 times a day ***hold if SBP &gt; 115*** (03 Apr 2023 15:12)  Robitussin Head and Chest Congestion 100 mg-5 mg/5 mL oral liquid: 10 milliliter(s) orally 3 times a day (03 Apr 2023 15:12)  rosuvastatin 20 mg oral tablet: 1 tab(s) orally once a day (in the evening) (03 Apr 2023 10:55)  Senna 8.6 mg oral tablet: 2 tab(s) orally once a day (at bedtime), hold for Diarrhea (03 Apr 2023 10:55)  sertraline 100 mg oral tablet: 1 tab(s) orally once a day (03 Apr 2023 10:55)  Symbicort 160 mcg-4.5 mcg/inh inhalation aerosol: 2 puff(s) inhaled 2 times a day (03 Apr 2023 10:55)  tiotropium 2.5 mcg/inh inhalation aerosol: 2 puff(s) inhaled once a day (03 Apr 2023 10:55)  Tylenol Extra Strength 500 mg oral tablet: 1 tab(s) orally every 6 hours, As Needed (03 Apr 2023 10:55)    MEDICATIONS  (STANDING):  albuterol/ipratropium for Nebulization 3 milliLiter(s) Nebulizer every 4 hours  artificial  tears Solution 1 Drop(s) Both EYES two times a day  aspirin  chewable 81 milliGRAM(s) Oral daily  atorvastatin 80 milliGRAM(s) Oral at bedtime  budesonide 160 MICROgram(s)/formoterol 4.5 MICROgram(s) Inhaler 2 Puff(s) Inhalation two times a day  cefepime   IVPB 2000 milliGRAM(s) IV Intermittent <User Schedule>  dextrose 5%. 1000 milliLiter(s) (50 mL/Hr) IV Continuous <Continuous>  dextrose 5%. 1000 milliLiter(s) (100 mL/Hr) IV Continuous <Continuous>  dextrose 50% Injectable 25 Gram(s) IV Push once  dextrose 50% Injectable 12.5 Gram(s) IV Push once  dextrose 50% Injectable 25 Gram(s) IV Push once  epoetin kin-epbx (RETACRIT) Injectable 74829 Unit(s) IV Push <User Schedule>  famotidine    Tablet 20 milliGRAM(s) Oral <User Schedule>  gabapentin 300 milliGRAM(s) Oral <User Schedule>  glucagon  Injectable 1 milliGRAM(s) IntraMuscular once  guaiFENesin Oral Liquid (Sugar-Free) 200 milliGRAM(s) Oral every 6 hours  insulin glargine Injectable (LANTUS) 5 Unit(s) SubCutaneous at bedtime  insulin lispro (ADMELOG) corrective regimen sliding scale   SubCutaneous three times a day before meals  insulin lispro (ADMELOG) corrective regimen sliding scale   SubCutaneous at bedtime  levothyroxine 75 MICROGram(s) Oral daily  methylPREDNISolone sodium succinate Injectable 40 milliGRAM(s) IV Push daily  midodrine. 5 milliGRAM(s) Oral three times a day  polyethylene glycol 3350 17 Gram(s) Oral daily  senna 2 Tablet(s) Oral at bedtime  sertraline 100 milliGRAM(s) Oral daily  tiotropium 2.5 MICROgram(s) Inhaler 2 Puff(s) Inhalation daily    MEDICATIONS  (PRN):  acetaminophen     Tablet .. 650 milliGRAM(s) Oral every 6 hours PRN Moderate Pain (4 - 6)  albuterol    0.083% 2.5 milliGRAM(s) Nebulizer every 6 hours PRN Shortness of Breath and/or Wheezing  aluminum hydroxide/magnesium hydroxide/simethicone Suspension 30 milliLiter(s) Oral every 4 hours PRN Dyspepsia  bisacodyl Suppository 10 milliGRAM(s) Rectal daily PRN Constipation  dextrose Oral Gel 15 Gram(s) Oral once PRN Blood Glucose LESS THAN 70 milliGRAM(s)/deciliter  melatonin 3 milliGRAM(s) Oral at bedtime PRN Insomnia  ondansetron Injectable 4 milliGRAM(s) IV Push every 8 hours PRN Nausea and/or Vomiting      Allergies    [place] (Unknown)  No Known Drug Allergies    Intolerances        Vital Signs Last 24 Hrs  T(C): 37.1 (10 Apr 2023 11:30), Max: 37.1 (10 Apr 2023 11:30)  T(F): 98.8 (10 Apr 2023 11:30), Max: 98.8 (10 Apr 2023 11:30)  HR: 67 (10 Apr 2023 11:30) (60 - 80)  BP: 125/62 (10 Apr 2023 11:30) (103/57 - 126/73)  BP(mean): --  RR: 17 (10 Apr 2023 11:30) (17 - 19)  SpO2: 99% (10 Apr 2023 06:36) (96% - 99%)    Parameters below as of 10 Apr 2023 11:30  Patient On (Oxygen Delivery Method): nasal cannula  O2 Flow (L/min): 3        PHYSICAL EXAMINATION:    NECK:  Supple. No lymphadenopathy. Jugular venous pressure not elevated. Carotids equal.   HEART:   The cardiac impulse has a normal quality. Reg., Nl S1 and S2.  There are no murmurs, rubs or gallops noted  CHEST:  Chest crackles to auscultation. Normal respiratory effort.  ABDOMEN:  Soft and nontender.   EXTREMITIES:  There is no edema.       LABS:                        8.3    10.35 )-----------( 118      ( 10 Apr 2023 07:49 )             26.6     04-10    135  |  101  |  58<H>  ----------------------------<  155<H>  3.8   |  30  |  4.82<H>    Ca    8.5      10 Apr 2023 07:49  Phos  3.3     04-10    TPro  x   /  Alb  2.9<L>  /  TBili  x   /  DBili  x   /  AST  x   /  ALT  x   /  AlkPhos  x   04-10

## 2023-04-10 NOTE — PROGRESS NOTE ADULT - SUBJECTIVE AND OBJECTIVE BOX
Patient is a 67y Female who reports no complaints as new, + cough at HD      MEDICATIONS  (STANDING):  albuterol/ipratropium for Nebulization 3 milliLiter(s) Nebulizer every 4 hours  artificial  tears Solution 1 Drop(s) Both EYES two times a day  aspirin  chewable 81 milliGRAM(s) Oral daily  atorvastatin 80 milliGRAM(s) Oral at bedtime  budesonide 160 MICROgram(s)/formoterol 4.5 MICROgram(s) Inhaler 2 Puff(s) Inhalation two times a day  cefepime   IVPB 2000 milliGRAM(s) IV Intermittent <User Schedule>  dextrose 5%. 1000 milliLiter(s) (100 mL/Hr) IV Continuous <Continuous>  dextrose 5%. 1000 milliLiter(s) (50 mL/Hr) IV Continuous <Continuous>  dextrose 50% Injectable 25 Gram(s) IV Push once  dextrose 50% Injectable 12.5 Gram(s) IV Push once  dextrose 50% Injectable 25 Gram(s) IV Push once  epoetin kin-epbx (RETACRIT) Injectable 81140 Unit(s) IV Push <User Schedule>  famotidine    Tablet 20 milliGRAM(s) Oral <User Schedule>  gabapentin 300 milliGRAM(s) Oral <User Schedule>  glucagon  Injectable 1 milliGRAM(s) IntraMuscular once  guaiFENesin Oral Liquid (Sugar-Free) 200 milliGRAM(s) Oral every 6 hours  insulin glargine Injectable (LANTUS) 5 Unit(s) SubCutaneous at bedtime  insulin lispro (ADMELOG) corrective regimen sliding scale   SubCutaneous three times a day before meals  insulin lispro (ADMELOG) corrective regimen sliding scale   SubCutaneous at bedtime  levothyroxine 75 MICROGram(s) Oral daily  methylPREDNISolone sodium succinate Injectable 40 milliGRAM(s) IV Push daily  midodrine. 5 milliGRAM(s) Oral three times a day  polyethylene glycol 3350 17 Gram(s) Oral daily  senna 2 Tablet(s) Oral at bedtime  sertraline 100 milliGRAM(s) Oral daily  tiotropium 2.5 MICROgram(s) Inhaler 2 Puff(s) Inhalation daily    MEDICATIONS  (PRN):  acetaminophen     Tablet .. 650 milliGRAM(s) Oral every 6 hours PRN Moderate Pain (4 - 6)  albuterol    0.083% 2.5 milliGRAM(s) Nebulizer every 6 hours PRN Shortness of Breath and/or Wheezing  aluminum hydroxide/magnesium hydroxide/simethicone Suspension 30 milliLiter(s) Oral every 4 hours PRN Dyspepsia  bisacodyl Suppository 10 milliGRAM(s) Rectal daily PRN Constipation  dextrose Oral Gel 15 Gram(s) Oral once PRN Blood Glucose LESS THAN 70 milliGRAM(s)/deciliter  melatonin 3 milliGRAM(s) Oral at bedtime PRN Insomnia  ondansetron Injectable 4 milliGRAM(s) IV Push every 8 hours PRN Nausea and/or Vomiting        T(C): , Max: 36.7 (04-09-23 @ 15:46)  T(F): , Max: 98.1 (04-09-23 @ 15:46)  HR: 64 (04-10-23 @ 08:23)  BP: 108/61 (04-10-23 @ 08:23)  BP(mean): --  RR: 17 (04-10-23 @ 08:23)  SpO2: 99% (04-10-23 @ 06:36)  Wt(kg): --        PHYSICAL EXAM:    Constitutional: NAD,   HEENT: P MM  scatt renay  Cardiovascular: S1 and S2   Gastrointestinal: BS+, soft, NT/ND  Extremities:tr peripheral edema  Neurological: asleep but arousable  : No Thompson  Skin: No rashes  Access: L avg        LABS:                        8.3    10.35 )-----------( 118      ( 10 Apr 2023 07:49 )             26.6     07 Apr 2023 11:57    132    |  99     |  50     ----------------------------<  199    4.1     |  27     |  4.70   06 Apr 2023 09:01    131    |  98     |  34     ----------------------------<  241    4.5     |  25     |  3.18     Ca    8.5        07 Apr 2023 11:57  Ca    8.5        06 Apr 2023 09:01  Phos  4.7       07 Apr 2023 11:57    TPro  x      /  Alb  3.0    /  TBili  x      /  DBili  x      /  AST  x      /  ALT  x      /  AlkPhos  x      07 Apr 2023 11:57          Urine Studies:          RADIOLOGY & ADDITIONAL STUDIES:

## 2023-04-10 NOTE — PROGRESS NOTE ADULT - ASSESSMENT
66 y/o F presented with weakness     PROBLEMS:    AC hypoxaemic & hypercapneic Respiratory failurew  Multifactorial: RUL consolidation/HCAP  AMADO not on CPAP, likely obesity hypoventilation syndrome,  ESRD missed session of dialysis/increaesd pleural effusions and fluid overload  Macrocytic anemia   Acute metabolic encephalopathy likely secondary to infectious etiology (HCAP) vs. metabolic vs. neurologic   Vomiting possibly secondary to enteritis vs. GERD vs. uremia? 3. Thrombocytopenia   Hyperglycemia secondary to Type 2 DM  Hypoalbuminemia   History of GI bleed, anemia, HTN, Type 2 DM, AMADO, obesity, COPD (on 3 L home O2), acute on chronic respiratory failure, CAD, HLD, pancreatitis, s/p Micra pacemaker, ESRD dialysis (M/W/F), questionable communication deficits at baseline?    PLAN:    Pulmonary better-used pap qhs-on 4lpm nc sat 96%-titrate- CXR today-decd planning  BIPAP 16/12-RR 15- fio2-ABG - ( 04 Apr 2023 18:20 )pH, Arterial: 7.20  pH, Blood: x     /  pCO2: 67    /  pO2: 84    / HCO3: 26    / Base Excess: -3.3  /  SaO2: 98.0    Change to AVAPS-30/15/8/400/40%-ABG in am-ABG - ( 05 Apr 2023 08:26 ) pH: 7.17  /  pCO2: 53    /  pO2: 84    / HCO3: 19    / Base Excess: -9.5  /  SaO2: 98      IV Cefepime   decd [id number] to po prednisone  S/p HD today, c/w M/W/F schedule   D/W hospitalist/d/w staff  DVT prophylasix

## 2023-04-10 NOTE — PROGRESS NOTE ADULT - SUBJECTIVE AND OBJECTIVE BOX
CC: Weakness       Subjective: Seen and evaluated at bedside. On NIPPV. Lethargic in AM but mentation more improved in the PM.     04/05: Seen and evaluated at bedside. Mentation improving. Off NIPPV (changed to AVAPS). ABG this AM reviewed. Had BM, per RN. HD today.   4/6:  Alert, having a lot of congestion, requesting a "Syrup" to help.   Discussed plan of care.  No fever, chills, + cough, + congestion.  4/7: Sitting in chair, less congested today, overall slowly improving.  Blood sugars elevated.  Awaiting HD today.  4/8: More alert, less weak, no fever, chills, n, v, + cough.  Family at bedside.  4/9: c/o chest tightness  4/10: feeling better today; less sob; po prednisone;     All other systems and founds to be negative with exception of what has been described above.       PHYSICAL EXAM:    Vital Signs Last 24 Hrs  T(C): 37.1 (10 Apr 2023 11:30), Max: 37.1 (10 Apr 2023 11:30)  T(F): 98.8 (10 Apr 2023 11:30), Max: 98.8 (10 Apr 2023 11:30)  HR: 67 (10 Apr 2023 11:30) (60 - 80)  BP: 125/62 (10 Apr 2023 11:30) (103/57 - 126/73)  BP(mean): --  RR: 17 (10 Apr 2023 11:30) (17 - 19)  SpO2: 99% (10 Apr 2023 06:36) (96% - 99%)    Parameters below as of 10 Apr 2023 11:30  Patient On (Oxygen Delivery Method): nasal cannula  O2 Flow (L/min): 3      Constitutional: Weak appearing  HEENT: Atraumatic, JAGJIT,   Respiratory: Breath Sounds normal, no rhonchi/wheeze  Cardiovascular: N S1S2;   Gastrointestinal: Abdomen soft, non tender, Bowel Sounds present  Extremities: No edema, peripheral pulses present  Neurological: AAO x 3, no gross focal motor deficits  Skin: Non cellulitic, no rash, ulcers  Lymph Nodes: No lymphadenopathy noted  Back: No CVA tenderness   Musculoskeletal: non tender  Breasts: Deferred  Genitourinary: deferred  Rectal: Deferred    All Labs/EKG/Radiology/Meds reviewed    Lab Results:  CBC  CBC Full  -  ( 10 Apr 2023 07:49 )  WBC Count : 10.35 K/uL  RBC Count : 2.67 M/uL  Hemoglobin : 8.3 g/dL  Hematocrit : 26.6 %  Platelet Count - Automated : 118 K/uL  Mean Cell Volume : 99.6 fl  Mean Cell Hemoglobin : 31.1 pg  Mean Cell Hemoglobin Concentration : 31.2 gm/dL  Auto Neutrophil # : x  Auto Lymphocyte # : x  Auto Monocyte # : x  Auto Eosinophil # : x  Auto Basophil # : x  Auto Neutrophil % : x  Auto Lymphocyte % : x  Auto Monocyte % : x  Auto Eosinophil % : x  Auto Basophil % : x    .		Differential:	[] Automated		[] Manual  Chemistry  04-10    135  |  101  |  58<H>  ----------------------------<  155<H>  3.8   |  30  |  4.82<H>    Ca    8.5      10 Apr 2023 07:49  Phos  3.3     04-10    TPro  x   /  Alb  2.9<L>  /  TBili  x   /  DBili  x   /  AST  x   /  ALT  x   /  AlkPhos  x   04-10    LIVER FUNCTIONS - ( 10 Apr 2023 07:49 )  Alb: 2.9 g/dL / Pro: x     / ALK PHOS: x     / ALT: x     / AST: x     / GGT: x                 04-10-23 @ 07:01  -  04-10-23 @ 15:44  --------------------------------------------------------  IN: 0 mL / OUT: 1500 mL / NET: -1500 mL      MICROBIOLOGY/CULTURES:        MEDICATIONS  (STANDING):  albuterol/ipratropium for Nebulization 3 milliLiter(s) Nebulizer every 4 hours  artificial  tears Solution 1 Drop(s) Both EYES two times a day  aspirin  chewable 81 milliGRAM(s) Oral daily  atorvastatin 80 milliGRAM(s) Oral at bedtime  budesonide 160 MICROgram(s)/formoterol 4.5 MICROgram(s) Inhaler 2 Puff(s) Inhalation two times a day  cefepime   IVPB 2000 milliGRAM(s) IV Intermittent <User Schedule>  dextrose 5%. 1000 milliLiter(s) (50 mL/Hr) IV Continuous <Continuous>  dextrose 5%. 1000 milliLiter(s) (100 mL/Hr) IV Continuous <Continuous>  dextrose 50% Injectable 25 Gram(s) IV Push once  dextrose 50% Injectable 12.5 Gram(s) IV Push once  dextrose 50% Injectable 25 Gram(s) IV Push once  epoetin kin-epbx (RETACRIT) Injectable 26696 Unit(s) IV Push <User Schedule>  famotidine    Tablet 20 milliGRAM(s) Oral <User Schedule>  gabapentin 300 milliGRAM(s) Oral <User Schedule>  glucagon  Injectable 1 milliGRAM(s) IntraMuscular once  guaiFENesin Oral Liquid (Sugar-Free) 200 milliGRAM(s) Oral every 6 hours  insulin glargine Injectable (LANTUS) 5 Unit(s) SubCutaneous at bedtime  insulin lispro (ADMELOG) corrective regimen sliding scale   SubCutaneous three times a day before meals  insulin lispro (ADMELOG) corrective regimen sliding scale   SubCutaneous at bedtime  levothyroxine 75 MICROGram(s) Oral daily  midodrine. 5 milliGRAM(s) Oral three times a day  polyethylene glycol 3350 17 Gram(s) Oral daily  predniSONE   Tablet 20 milliGRAM(s) Oral daily  senna 2 Tablet(s) Oral at bedtime  sertraline 100 milliGRAM(s) Oral daily  tiotropium 2.5 MICROgram(s) Inhaler 2 Puff(s) Inhalation daily      MEDICATIONS  (PRN):  acetaminophen     Tablet .. 650 milliGRAM(s) Oral every 6 hours PRN Moderate Pain (4 - 6)  albuterol    0.083% 2.5 milliGRAM(s) Nebulizer every 6 hours PRN Shortness of Breath and/or Wheezing  aluminum hydroxide/magnesium hydroxide/simethicone Suspension 30 milliLiter(s) Oral every 4 hours PRN Dyspepsia  bisacodyl Suppository 10 milliGRAM(s) Rectal daily PRN Constipation  dextrose Oral Gel 15 Gram(s) Oral once PRN Blood Glucose LESS THAN 70 milliGRAM(s)/deciliter  lactulose Syrup 20 Gram(s) Oral two times a day PRN constipation  melatonin 3 milliGRAM(s) Oral at bedtime PRN Insomnia  ondansetron Injectable 4 milliGRAM(s) IV Push every 8 hours PRN Nausea and/or Vomiting        Assessment and Plan:  66 y/o F with PMH GI bleed, anemia, HTN, Type 2 DM, AMADO, obesity, COPD (on 3 L home O2), acute on chronic respiratory failure, CAD, HLD, pancreatitis, s/p Micra pacemaker, ESRD dialysis (M/W/F), s/p AV fistula placement questionable communication deficits at baseline? presented with weakness. The pt stated that she did not feel well the past few days and refused dialysis Friday and today. Reports feeling weak, tired, cough with mucus production, shortness of breath, vomiting. At the time of my evaluation the pt was unable to provide some of her history. I spoke to her daughter, Bel, over the phone who stated that her mother has not been acting like herself. She seems more confused than usual (normally she is A+Ox3) and more fatigued. This has happened in the past when her Hb has been low. She had a colonoscopy within the last 1 year which was negative. She used to get Procrit injections but has not been getting any recently. Additionally, she has been having chest congestion, shortness of breath, and cramping in her hands. The pt's daughter stated the pt has AMADO but she has not been wearing CPAP at [facility]. The pt had an AV fistula placed 3 weeks ago in the left arm.     #Acute on chronic Hypoxic/Hypercapnic Respiratory Failure,  RUL consolidation, likely PNA(Gram Negative Pneumonia), Metabolic Encephalopathy   -on 4L NC (baseline 3L)  -CT head: negative   -CT chest, abdomen/pelvis: as above   -c/w Cefepime + Azithro  -c/w Solumedrol per pulm, down to qd and now switched to po prednisone 20 mg daily from 4/10   -Spiriva, Symbicort, Nebs   -robitussen   -NIPPV QHS   -cultures no growth   -TSH and Vitamin B-12: normal   -aspiration precautions   -pulmonary and ID following      # c/o Chest Tightness:   Hx of CAD with stents  stat EKG done; normal  serial trops neg  monitor closely    DM2:  -a1c 6.2  -started lantus 5u qhs due to steroids and hyperglycemia  -ISS    #Stercoral Colitis   -aggressive bowel regimen     #Anemia likely due to chronic disease:  -s/p 1u PRBC t4/7  -on procrit     #ESRD  -on HD   -nephrology following    #VTE Prophylaxis  -SCDs    Dispo:  d/c planing for Tuesday to [facility].        POC discussed with pt, team, Dr. Sheppard CC: Weakness       Subjective: Seen and evaluated at bedside. On NIPPV. Lethargic in AM but mentation more improved in the PM.     04/05: Seen and evaluated at bedside. Mentation improving. Off NIPPV (changed to AVAPS). ABG this AM reviewed. Had BM, per RN. HD today.   4/6:  Alert, having a lot of congestion, requesting a "Syrup" to help.   Discussed plan of care.  No fever, chills, + cough, + congestion.  4/7: Sitting in chair, less congested today, overall slowly improving.  Blood sugars elevated.  Awaiting HD today.  4/8: More alert, less weak, no fever, chills, n, v, + cough.  Family at bedside.  4/9: c/o chest tightness  4/10: feeling better today; less sob; po prednisone;     All other systems and founds to be negative with exception of what has been described above.       PHYSICAL EXAM:    Vital Signs Last 24 Hrs  T(C): 37.1 (10 Apr 2023 11:30), Max: 37.1 (10 Apr 2023 11:30)  T(F): 98.8 (10 Apr 2023 11:30), Max: 98.8 (10 Apr 2023 11:30)  HR: 67 (10 Apr 2023 11:30) (60 - 80)  BP: 125/62 (10 Apr 2023 11:30) (103/57 - 126/73)  BP(mean): --  RR: 17 (10 Apr 2023 11:30) (17 - 19)  SpO2: 99% (10 Apr 2023 06:36) (96% - 99%)    Parameters below as of 10 Apr 2023 11:30  Patient On (Oxygen Delivery Method): nasal cannula  O2 Flow (L/min): 3      Constitutional: Weak appearing  HEENT: Atraumatic, JAGJIT,   Respiratory: Breath Sounds normal, no rhonchi/wheeze  Cardiovascular: N S1S2;   Gastrointestinal: Abdomen soft, non tender, Bowel Sounds present  Extremities: No edema, peripheral pulses present  Neurological: AAO x 3, no gross focal motor deficits  Skin: Non cellulitic, no rash, ulcers  Lymph Nodes: No lymphadenopathy noted  Back: No CVA tenderness   Musculoskeletal: non tender  Breasts: Deferred  Genitourinary: deferred  Rectal: Deferred    All Labs/EKG/Radiology/Meds reviewed    Lab Results:  CBC  CBC Full  -  ( 10 Apr 2023 07:49 )  WBC Count : 10.35 K/uL  RBC Count : 2.67 M/uL  Hemoglobin : 8.3 g/dL  Hematocrit : 26.6 %  Platelet Count - Automated : 118 K/uL  Mean Cell Volume : 99.6 fl  Mean Cell Hemoglobin : 31.1 pg  Mean Cell Hemoglobin Concentration : 31.2 gm/dL  Auto Neutrophil # : x  Auto Lymphocyte # : x  Auto Monocyte # : x  Auto Eosinophil # : x  Auto Basophil # : x  Auto Neutrophil % : x  Auto Lymphocyte % : x  Auto Monocyte % : x  Auto Eosinophil % : x  Auto Basophil % : x    .		Differential:	[] Automated		[] Manual  Chemistry  04-10    135  |  101  |  58<H>  ----------------------------<  155<H>  3.8   |  30  |  4.82<H>    Ca    8.5      10 Apr 2023 07:49  Phos  3.3     04-10    TPro  x   /  Alb  2.9<L>  /  TBili  x   /  DBili  x   /  AST  x   /  ALT  x   /  AlkPhos  x   04-10    LIVER FUNCTIONS - ( 10 Apr 2023 07:49 )  Alb: 2.9 g/dL / Pro: x     / ALK PHOS: x     / ALT: x     / AST: x     / GGT: x                 04-10-23 @ 07:01  -  04-10-23 @ 15:44  --------------------------------------------------------  IN: 0 mL / OUT: 1500 mL / NET: -1500 mL      MICROBIOLOGY/CULTURES:        MEDICATIONS  (STANDING):  albuterol/ipratropium for Nebulization 3 milliLiter(s) Nebulizer every 4 hours  artificial  tears Solution 1 Drop(s) Both EYES two times a day  aspirin  chewable 81 milliGRAM(s) Oral daily  atorvastatin 80 milliGRAM(s) Oral at bedtime  budesonide 160 MICROgram(s)/formoterol 4.5 MICROgram(s) Inhaler 2 Puff(s) Inhalation two times a day  cefepime   IVPB 2000 milliGRAM(s) IV Intermittent <User Schedule>  dextrose 5%. 1000 milliLiter(s) (50 mL/Hr) IV Continuous <Continuous>  dextrose 5%. 1000 milliLiter(s) (100 mL/Hr) IV Continuous <Continuous>  dextrose 50% Injectable 25 Gram(s) IV Push once  dextrose 50% Injectable 12.5 Gram(s) IV Push once  dextrose 50% Injectable 25 Gram(s) IV Push once  epoetin kin-epbx (RETACRIT) Injectable 95704 Unit(s) IV Push <User Schedule>  famotidine    Tablet 20 milliGRAM(s) Oral <User Schedule>  gabapentin 300 milliGRAM(s) Oral <User Schedule>  glucagon  Injectable 1 milliGRAM(s) IntraMuscular once  guaiFENesin Oral Liquid (Sugar-Free) 200 milliGRAM(s) Oral every 6 hours  insulin glargine Injectable (LANTUS) 5 Unit(s) SubCutaneous at bedtime  insulin lispro (ADMELOG) corrective regimen sliding scale   SubCutaneous three times a day before meals  insulin lispro (ADMELOG) corrective regimen sliding scale   SubCutaneous at bedtime  levothyroxine 75 MICROGram(s) Oral daily  midodrine. 5 milliGRAM(s) Oral three times a day  polyethylene glycol 3350 17 Gram(s) Oral daily  predniSONE   Tablet 20 milliGRAM(s) Oral daily  senna 2 Tablet(s) Oral at bedtime  sertraline 100 milliGRAM(s) Oral daily  tiotropium 2.5 MICROgram(s) Inhaler 2 Puff(s) Inhalation daily      MEDICATIONS  (PRN):  acetaminophen     Tablet .. 650 milliGRAM(s) Oral every 6 hours PRN Moderate Pain (4 - 6)  albuterol    0.083% 2.5 milliGRAM(s) Nebulizer every 6 hours PRN Shortness of Breath and/or Wheezing  aluminum hydroxide/magnesium hydroxide/simethicone Suspension 30 milliLiter(s) Oral every 4 hours PRN Dyspepsia  bisacodyl Suppository 10 milliGRAM(s) Rectal daily PRN Constipation  dextrose Oral Gel 15 Gram(s) Oral once PRN Blood Glucose LESS THAN 70 milliGRAM(s)/deciliter  lactulose Syrup 20 Gram(s) Oral two times a day PRN constipation  melatonin 3 milliGRAM(s) Oral at bedtime PRN Insomnia  ondansetron Injectable 4 milliGRAM(s) IV Push every 8 hours PRN Nausea and/or Vomiting        Assessment and Plan:  68 y/o F with PMH GI bleed, anemia, HTN, Type 2 DM, AMADO, obesity, COPD (on 3 L home O2), acute on chronic respiratory failure, CAD, HLD, pancreatitis, s/p Micra pacemaker, ESRD dialysis (M/W/F), s/p AV fistula placement questionable communication deficits at baseline? presented with weakness. The pt stated that she did not feel well the past few days and refused dialysis Friday and today. Reports feeling weak, tired, cough with mucus production, shortness of breath, vomiting. At the time of my evaluation the pt was unable to provide some of her history. I spoke to her daughter, Bel, over the phone who stated that her mother has not been acting like herself. She seems more confused than usual (normally she is A+Ox3) and more fatigued. This has happened in the past when her Hb has been low. She had a colonoscopy within the last 1 year which was negative. She used to get Procrit injections but has not been getting any recently. Additionally, she has been having chest congestion, shortness of breath, and cramping in her hands. The pt's daughter stated the pt has AMADO but she has not been wearing CPAP at [facility]. The pt had an AV fistula placed 3 weeks ago in the left arm.     #Acute on chronic Hypoxic/Hypercapnic Respiratory Failure,  RUL consolidation, likely PNA(Gram Negative Pneumonia), Metabolic Encephalopathy   -on 4L NC (baseline 3L)  -CT head: negative   -CT chest, abdomen/pelvis: as above   -c/w Cefepime + Azithro  -c/w Solumedrol per pulm, down to qd and now switched to po prednisone 20 mg daily from 4/10 x 5 days  -Spiriva, Symbicort, Nebs   -robitussen   -NIPPV QHS   -cultures no growth   -TSH and Vitamin B-12: normal   -aspiration precautions   -pulmonary and ID following  stop BiPAP at night from 4/10.     # c/o Chest Tightness:   Hx of CAD with stents  stat EKG done; normal  serial trops neg  monitor closely    DM2:  -a1c 6.2  -started lantus 5u qhs due to steroids and hyperglycemia  -ISS    #Stercoral Colitis   -aggressive bowel regimen     #Anemia likely due to chronic disease:  -s/p 1u PRBC t4/7  -on procrit     #ESRD  -on HD   -nephrology following    #VTE Prophylaxis  -SCDs    Dispo:  d/c planing for Tuesday to [facility].        POC discussed with pt, team, Dr. Sheppard

## 2023-04-10 NOTE — PROGRESS NOTE ADULT - ASSESSMENT
67  HTN, DM2, COPD ( on 3 L home o2), CAD, HLD, pancreatitis, ESRD dialysis ( M/W/F), with poor UF/volume removal at HD outpatient at [place] with SOB     SOB/Hypoxia   Fluid overload /PNA, well below DW  Abx per ID  Optimize volume w hd    ESRD  -HD TIW, treatment now  -K protocol  -New DW at HD unit when eventual dc    Anemia    - VICKI    COPD  -Nightly bipap, baseline o2

## 2023-04-11 ENCOUNTER — TRANSCRIPTION ENCOUNTER (OUTPATIENT)
Age: 68
End: 2023-04-11

## 2023-04-11 VITALS — OXYGEN SATURATION: 100 %

## 2023-04-11 LAB
GLUCOSE BLDC GLUCOMTR-MCNC: 167 MG/DL — HIGH (ref 70–99)
GLUCOSE BLDC GLUCOMTR-MCNC: 208 MG/DL — HIGH (ref 70–99)

## 2023-04-11 PROCEDURE — 99239 HOSP IP/OBS DSCHRG MGMT >30: CPT

## 2023-04-11 RX ORDER — POLYETHYLENE GLYCOL 3350 17 G/17G
17 POWDER, FOR SOLUTION ORAL
Qty: 0 | Refills: 0 | DISCHARGE
Start: 2023-04-11

## 2023-04-11 RX ADMIN — BUDESONIDE AND FORMOTEROL FUMARATE DIHYDRATE 2 PUFF(S): 160; 4.5 AEROSOL RESPIRATORY (INHALATION) at 09:38

## 2023-04-11 RX ADMIN — Medication 3 MILLILITER(S): at 09:36

## 2023-04-11 RX ADMIN — SERTRALINE 100 MILLIGRAM(S): 25 TABLET, FILM COATED ORAL at 10:01

## 2023-04-11 RX ADMIN — Medication 81 MILLIGRAM(S): at 10:01

## 2023-04-11 RX ADMIN — TIOTROPIUM BROMIDE 2 PUFF(S): 18 CAPSULE ORAL; RESPIRATORY (INHALATION) at 09:38

## 2023-04-11 RX ADMIN — Medication 2: at 08:40

## 2023-04-11 RX ADMIN — Medication 200 MILLIGRAM(S): at 05:27

## 2023-04-11 RX ADMIN — Medication 3 MILLILITER(S): at 12:52

## 2023-04-11 RX ADMIN — Medication 1 DROP(S): at 10:01

## 2023-04-11 RX ADMIN — Medication 20 MILLIGRAM(S): at 10:01

## 2023-04-11 RX ADMIN — Medication 4: at 11:56

## 2023-04-11 RX ADMIN — Medication 75 MICROGRAM(S): at 05:27

## 2023-04-11 RX ADMIN — MIDODRINE HYDROCHLORIDE 5 MILLIGRAM(S): 2.5 TABLET ORAL at 05:55

## 2023-04-11 NOTE — DISCHARGE NOTE PROVIDER - NSDCMRMEDTOKEN_GEN_ALL_CORE_FT
Albuterol (Eqv-ProAir HFA) 90 mcg/inh inhalation aerosol: 2 puff(s) inhaled every 12 hours as needed for  shortness of breath and/or wheezing  Artificial Tears ophthalmic solution: 1 drop(s) in each eye 2 times a day  aspirin 81 mg oral tablet, chewable: 1 tab(s) orally once a day  bisacodyl 10 mg rectal suppository: 1 suppository(ies) rectal once a day As needed Constipation  Colace 100 mg oral capsule: 2 cap(s) orally once a day (at bedtime), HOLD for diarrhea  famotidine 20 mg oral tablet: 1 tab(s) orally Monday, Wednesday, and Friday after dialysis   gabapentin 300 mg oral capsule: 1 cap(s) orally Monday, Wednesday, and Friday at bedtime   guaiFENesin 100 mg/5 mL oral liquid: 10 milliliter(s) orally every 6 hours  HumaLOG 100 units/mL subcutaneous solution: subcutaneous 3 times a day (before meals) per sliding scale:  151-200 = 1 units  201-250 = 2 units  251-300 = 3 units  301-350 = 4 units  351-400 = 6 units  &gt;400 = call MD    lactulose 10 g/15 mL oral syrup: 30 milliliter(s) orally 2 times a day, As Needed -for constipation   Lantus Solostar Pen 100 units/mL subcutaneous solution: 7 unit(s) subcutaneous once a day (at bedtime)  levothyroxine 75 mcg (0.075 mg) oral tablet: 1 tab(s) orally once a day  midodrine 5 mg oral tablet: 1 tab(s) orally 3 times a day ***hold if SBP &gt; 115***  polyethylene glycol 3350 oral powder for reconstitution: 17 gram(s) orally once a day  predniSONE 20 mg oral tablet: 1 tab(s) orally once a day  Robitussin Head and Chest Congestion 100 mg-5 mg/5 mL oral liquid: 10 milliliter(s) orally 3 times a day  rosuvastatin 20 mg oral tablet: 1 tab(s) orally once a day (in the evening)  Senna 8.6 mg oral tablet: 2 tab(s) orally once a day (at bedtime), hold for Diarrhea  sertraline 100 mg oral tablet: 1 tab(s) orally once a day  Symbicort 160 mcg-4.5 mcg/inh inhalation aerosol: 2 puff(s) inhaled 2 times a day  tiotropium 2.5 mcg/inh inhalation aerosol: 2 puff(s) inhaled once a day  Tylenol Extra Strength 500 mg oral tablet: 1 tab(s) orally every 6 hours, As Needed

## 2023-04-11 NOTE — DISCHARGE NOTE PROVIDER - HOSPITAL COURSE
PHYSICAL EXAM:    Daily     Daily Weight in k (2023 05:00)    Vital Signs Last 24 Hrs  T(C): 37 (2023 08:12), Max: 37 (2023 08:12)  T(F): 98.6 (2023 08:12), Max: 98.6 (2023 08:12)  HR: 65 (2023 11:54) (65 - 79)  BP: 127/65 (2023 11:54) (112/53 - 142/66)  BP(mean): --  RR: 18 (2023 11:54) (16 - 18)  SpO2: 100% (2023 11:54) (97% - 100%)    Constitutional: Weak  appearing  HEENT: Atraumatic, JAGJIT, Normal, No congestion  Respiratory: Breath Sounds normal, no rhonchi/wheeze  Cardiovascular: N S1S2;   Gastrointestinal: Abdomen soft, non tender, Bowel Sounds present  Extremities: No edema, peripheral pulses present  Neurological: AAO x 3, no gross focal motor deficits  Skin: Non cellulitic, no rash, ulcers  Lymph Nodes: No lymphadenopathy noted  Back: No CVA tenderness   Musculoskeletal: non tender  Breasts: Deferred  Genitourinary: deferred  Rectal: Deferred       68 y/o F with PMH GI bleed, anemia, HTN, Type 2 DM, AMADO, obesity, COPD (on 3 L home O2), acute on chronic respiratory failure, CAD, HLD, pancreatitis, s/p Micra pacemaker, ESRD dialysis (M/W/F), s/p AV fistula placement questionable communication deficits at baseline? presented with weakness. The pt stated that she did not feel well the past few days and refused dialysis Friday and today. Reports feeling weak, tired, cough with mucus production, shortness of breath, vomiting. At the time of my evaluation the pt was unable to provide some of her history. I spoke to her daughter, Bel, over the phone who stated that her mother has not been acting like herself. She seems more confused than usual (normally she is A+Ox3) and more fatigued. This has happened in the past when her Hb has been low. She had a colonoscopy within the last 1 year which was negative. She used to get Procrit injections but has not been getting any recently. Additionally, she has been having chest congestion, shortness of breath, and cramping in her hands. The pt's daughter stated the pt has AMADO but she has not been wearing CPAP at [facility]. The pt had an AV fistula placed 3 weeks ago in the left arm.     #Acute on chronic Hypoxic/Hypercapnic Respiratory Failure,  RUL consolidation, likely PNA(Gram Negative Pneumonia), Metabolic Encephalopathy   -on 4L NC (baseline 3L)  -CT head: negative   -CT chest, abdomen/pelvis: as above   -c/w Cefepime + Azithro completed  -c/w Solumedrol per pulm, down to qd and now switched to po prednisone 20 mg daily x 5 days till 4/15  -Spiriva, Symbicort, Nebs   -Robitussin prn  -cultures no growth   -TSH and Vitamin B-12: normal   -aspiration precautions   -pulmonary and ID following  stopped BiPAP at night from 4/10. Pt did fine.     # c/o Chest Tightness:   Hx of CAD with stents  stat EKG done; normal  serial trops neg   resolved    DM2:  -a1c 6.2  home meds    #Stercoral Colitis   -aggressive bowel regimen     #Anemia likely due to chronic disease:  -s/p 1u PRBC t4/7  -on procrit     #ESRD  -on HD MWF      Dispo:  d/c to [facility].    time spent 45 min    POC discussed with pt, team, Dr. Sheppard

## 2023-04-11 NOTE — DISCHARGE NOTE PROVIDER - NSDCCPCAREPLAN_GEN_ALL_CORE_FT
PRINCIPAL DISCHARGE DIAGNOSIS  Diagnosis: Pneumonia  Assessment and Plan of Treatment: resolved     PRINCIPAL DISCHARGE DIAGNOSIS  Diagnosis: Pneumonia  Assessment and Plan of Treatment: resolved      SECONDARY DISCHARGE DIAGNOSES  Diagnosis: COPD exacerbation  Assessment and Plan of Treatment: take prednisone 20 mg daily till 4/15  cont inh  f/u with Pulmo    Diagnosis: Type 1 respiratory failure  Assessment and Plan of Treatment: resolved  cont O2

## 2023-04-11 NOTE — DISCHARGE NOTE NURSING/CASE MANAGEMENT/SOCIAL WORK - PATIENT PORTAL LINK FT
You can access the FollowMyHealth Patient Portal offered by [facility] by registering at the following website: [email or web address]. By joining [facility]’s FollowMyHealth portal, you will also be able to view your health information using other applications (apps) compatible with our system.

## 2023-04-11 NOTE — PROGRESS NOTE ADULT - SUBJECTIVE AND OBJECTIVE BOX
Subjective:    Home Medications:  Albuterol (Eqv-ProAir HFA) 90 mcg/inh inhalation aerosol: 2 puff(s) inhaled every 12 hours as needed for  shortness of breath and/or wheezing (03 Apr 2023 15:12)  Artificial Tears ophthalmic solution: 1 drop(s) in each eye 2 times a day (03 Apr 2023 15:12)  aspirin 81 mg oral tablet, chewable: 1 tab(s) orally once a day (03 Apr 2023 10:55)  Colace 100 mg oral capsule: 2 cap(s) orally once a day (at bedtime), HOLD for diarrhea (03 Apr 2023 10:55)  famotidine 20 mg oral tablet: 1 tab(s) orally Monday, Wednesday, and Friday after dialysis  (03 Apr 2023 10:55)  gabapentin 300 mg oral capsule: 1 cap(s) orally Monday, Wednesday, and Friday at bedtime  (03 Apr 2023 10:55)  HumaLOG 100 units/mL subcutaneous solution: subcutaneous 3 times a day (before meals) per sliding scale:  151-200 = 1 units  201-250 = 2 units  251-300 = 3 units  301-350 = 4 units  351-400 = 6 units  &gt;400 = call MD   (03 Apr 2023 11:07)  Lantus Solostar Pen 100 units/mL subcutaneous solution: 7 unit(s) subcutaneous once a day (at bedtime) (03 Apr 2023 10:55)  levothyroxine 75 mcg (0.075 mg) oral tablet: 1 tab(s) orally once a day (03 Apr 2023 10:55)  midodrine 5 mg oral tablet: 1 tab(s) orally 3 times a day ***hold if SBP &gt; 115*** (03 Apr 2023 15:12)  Robitussin Head and Chest Congestion 100 mg-5 mg/5 mL oral liquid: 10 milliliter(s) orally 3 times a day (03 Apr 2023 15:12)  rosuvastatin 20 mg oral tablet: 1 tab(s) orally once a day (in the evening) (03 Apr 2023 10:55)  Senna 8.6 mg oral tablet: 2 tab(s) orally once a day (at bedtime), hold for Diarrhea (03 Apr 2023 10:55)  sertraline 100 mg oral tablet: 1 tab(s) orally once a day (03 Apr 2023 10:55)  Symbicort 160 mcg-4.5 mcg/inh inhalation aerosol: 2 puff(s) inhaled 2 times a day (03 Apr 2023 10:55)  tiotropium 2.5 mcg/inh inhalation aerosol: 2 puff(s) inhaled once a day (03 Apr 2023 10:55)  Tylenol Extra Strength 500 mg oral tablet: 1 tab(s) orally every 6 hours, As Needed (03 Apr 2023 10:55)    MEDICATIONS  (STANDING):  albuterol/ipratropium for Nebulization 3 milliLiter(s) Nebulizer every 4 hours  artificial  tears Solution 1 Drop(s) Both EYES two times a day  aspirin  chewable 81 milliGRAM(s) Oral daily  atorvastatin 80 milliGRAM(s) Oral at bedtime  budesonide 160 MICROgram(s)/formoterol 4.5 MICROgram(s) Inhaler 2 Puff(s) Inhalation two times a day  dextrose 5%. 1000 milliLiter(s) (50 mL/Hr) IV Continuous <Continuous>  dextrose 5%. 1000 milliLiter(s) (100 mL/Hr) IV Continuous <Continuous>  dextrose 50% Injectable 25 Gram(s) IV Push once  dextrose 50% Injectable 12.5 Gram(s) IV Push once  dextrose 50% Injectable 25 Gram(s) IV Push once  epoetin kin-epbx (RETACRIT) Injectable 70170 Unit(s) IV Push <User Schedule>  famotidine    Tablet 20 milliGRAM(s) Oral <User Schedule>  gabapentin 300 milliGRAM(s) Oral <User Schedule>  glucagon  Injectable 1 milliGRAM(s) IntraMuscular once  guaiFENesin Oral Liquid (Sugar-Free) 200 milliGRAM(s) Oral every 6 hours  insulin glargine Injectable (LANTUS) 5 Unit(s) SubCutaneous at bedtime  insulin lispro (ADMELOG) corrective regimen sliding scale   SubCutaneous three times a day before meals  insulin lispro (ADMELOG) corrective regimen sliding scale   SubCutaneous at bedtime  levothyroxine 75 MICROGram(s) Oral daily  midodrine. 5 milliGRAM(s) Oral three times a day  polyethylene glycol 3350 17 Gram(s) Oral daily  predniSONE   Tablet 20 milliGRAM(s) Oral daily  senna 2 Tablet(s) Oral at bedtime  sertraline 100 milliGRAM(s) Oral daily  tiotropium 2.5 MICROgram(s) Inhaler 2 Puff(s) Inhalation daily    MEDICATIONS  (PRN):  acetaminophen     Tablet .. 650 milliGRAM(s) Oral every 6 hours PRN Moderate Pain (4 - 6)  albuterol    0.083% 2.5 milliGRAM(s) Nebulizer every 6 hours PRN Shortness of Breath and/or Wheezing  aluminum hydroxide/magnesium hydroxide/simethicone Suspension 30 milliLiter(s) Oral every 4 hours PRN Dyspepsia  bisacodyl Suppository 10 milliGRAM(s) Rectal daily PRN Constipation  dextrose Oral Gel 15 Gram(s) Oral once PRN Blood Glucose LESS THAN 70 milliGRAM(s)/deciliter  melatonin 3 milliGRAM(s) Oral at bedtime PRN Insomnia  ondansetron Injectable 4 milliGRAM(s) IV Push every 8 hours PRN Nausea and/or Vomiting      Allergies    Khai (Unknown)  No Known Drug Allergies    Intolerances        Vital Signs Last 24 Hrs  T(C): 37 (11 Apr 2023 08:12), Max: 37.1 (10 Apr 2023 11:30)  T(F): 98.6 (11 Apr 2023 08:12), Max: 98.8 (10 Apr 2023 11:30)  HR: 67 (11 Apr 2023 08:12) (60 - 79)  BP: 129/57 (11 Apr 2023 08:12) (106/55 - 142/66)  BP(mean): --  RR: 16 (11 Apr 2023 08:12) (16 - 17)  SpO2: 99% (11 Apr 2023 08:12) (97% - 99%)    Parameters below as of 11 Apr 2023 08:12  Patient On (Oxygen Delivery Method): nasal cannula  O2 Flow (L/min): 3        PHYSICAL EXAMINATION:    NECK:  Supple. No lymphadenopathy. Jugular venous pressure not elevated. Carotids equal.   HEART:   The cardiac impulse has a normal quality. Reg., Nl S1 and S2.  There are no murmurs, rubs or gallops noted  CHEST:  Chest is clear to auscultation. Normal respiratory effort.  ABDOMEN:  Soft and nontender.   EXTREMITIES:  There is no edema.       LABS:                        8.3    10.35 )-----------( 118      ( 10 Apr 2023 07:49 )             26.6     04-10    135  |  101  |  58<H>  ----------------------------<  155<H>  3.8   |  30  |  4.82<H>    Ca    8.5      10 Apr 2023 07:49  Phos  3.3     04-10    TPro  x   /  Alb  2.9<L>  /  TBili  x   /  DBili  x   /  AST  x   /  ALT  x   /  AlkPhos  x   04-10               Subjective:    pat better, sitting in bed, waiting rehab placement    Home Medications:  Albuterol (Eqv-ProAir HFA) 90 mcg/inh inhalation aerosol: 2 puff(s) inhaled every 12 hours as needed for  shortness of breath and/or wheezing (03 Apr 2023 15:12)  Artificial Tears ophthalmic solution: 1 drop(s) in each eye 2 times a day (03 Apr 2023 15:12)  aspirin 81 mg oral tablet, chewable: 1 tab(s) orally once a day (03 Apr 2023 10:55)  Colace 100 mg oral capsule: 2 cap(s) orally once a day (at bedtime), HOLD for diarrhea (03 Apr 2023 10:55)  famotidine 20 mg oral tablet: 1 tab(s) orally Monday, Wednesday, and Friday after dialysis  (03 Apr 2023 10:55)  gabapentin 300 mg oral capsule: 1 cap(s) orally Monday, Wednesday, and Friday at bedtime  (03 Apr 2023 10:55)  HumaLOG 100 units/mL subcutaneous solution: subcutaneous 3 times a day (before meals) per sliding scale:  151-200 = 1 units  201-250 = 2 units  251-300 = 3 units  301-350 = 4 units  351-400 = 6 units  &gt;400 = call MD   (03 Apr 2023 11:07)  Lantus Solostar Pen 100 units/mL subcutaneous solution: 7 unit(s) subcutaneous once a day (at bedtime) (03 Apr 2023 10:55)  levothyroxine 75 mcg (0.075 mg) oral tablet: 1 tab(s) orally once a day (03 Apr 2023 10:55)  midodrine 5 mg oral tablet: 1 tab(s) orally 3 times a day ***hold if SBP &gt; 115*** (03 Apr 2023 15:12)  Robitussin Head and Chest Congestion 100 mg-5 mg/5 mL oral liquid: 10 milliliter(s) orally 3 times a day (03 Apr 2023 15:12)  rosuvastatin 20 mg oral tablet: 1 tab(s) orally once a day (in the evening) (03 Apr 2023 10:55)  Senna 8.6 mg oral tablet: 2 tab(s) orally once a day (at bedtime), hold for Diarrhea (03 Apr 2023 10:55)  sertraline 100 mg oral tablet: 1 tab(s) orally once a day (03 Apr 2023 10:55)  Symbicort 160 mcg-4.5 mcg/inh inhalation aerosol: 2 puff(s) inhaled 2 times a day (03 Apr 2023 10:55)  tiotropium 2.5 mcg/inh inhalation aerosol: 2 puff(s) inhaled once a day (03 Apr 2023 10:55)  Tylenol Extra Strength 500 mg oral tablet: 1 tab(s) orally every 6 hours, As Needed (03 Apr 2023 10:55)    MEDICATIONS  (STANDING):  albuterol/ipratropium for Nebulization 3 milliLiter(s) Nebulizer every 4 hours  artificial  tears Solution 1 Drop(s) Both EYES two times a day  aspirin  chewable 81 milliGRAM(s) Oral daily  atorvastatin 80 milliGRAM(s) Oral at bedtime  budesonide 160 MICROgram(s)/formoterol 4.5 MICROgram(s) Inhaler 2 Puff(s) Inhalation two times a day  dextrose 5%. 1000 milliLiter(s) (50 mL/Hr) IV Continuous <Continuous>  dextrose 5%. 1000 milliLiter(s) (100 mL/Hr) IV Continuous <Continuous>  dextrose 50% Injectable 25 Gram(s) IV Push once  dextrose 50% Injectable 12.5 Gram(s) IV Push once  dextrose 50% Injectable 25 Gram(s) IV Push once  epoetin kin-epbx (RETACRIT) Injectable 64514 Unit(s) IV Push <User Schedule>  famotidine    Tablet 20 milliGRAM(s) Oral <User Schedule>  gabapentin 300 milliGRAM(s) Oral <User Schedule>  glucagon  Injectable 1 milliGRAM(s) IntraMuscular once  guaiFENesin Oral Liquid (Sugar-Free) 200 milliGRAM(s) Oral every 6 hours  insulin glargine Injectable (LANTUS) 5 Unit(s) SubCutaneous at bedtime  insulin lispro (ADMELOG) corrective regimen sliding scale   SubCutaneous three times a day before meals  insulin lispro (ADMELOG) corrective regimen sliding scale   SubCutaneous at bedtime  levothyroxine 75 MICROGram(s) Oral daily  midodrine. 5 milliGRAM(s) Oral three times a day  polyethylene glycol 3350 17 Gram(s) Oral daily  predniSONE   Tablet 20 milliGRAM(s) Oral daily  senna 2 Tablet(s) Oral at bedtime  sertraline 100 milliGRAM(s) Oral daily  tiotropium 2.5 MICROgram(s) Inhaler 2 Puff(s) Inhalation daily    MEDICATIONS  (PRN):  acetaminophen     Tablet .. 650 milliGRAM(s) Oral every 6 hours PRN Moderate Pain (4 - 6)  albuterol    0.083% 2.5 milliGRAM(s) Nebulizer every 6 hours PRN Shortness of Breath and/or Wheezing  aluminum hydroxide/magnesium hydroxide/simethicone Suspension 30 milliLiter(s) Oral every 4 hours PRN Dyspepsia  bisacodyl Suppository 10 milliGRAM(s) Rectal daily PRN Constipation  dextrose Oral Gel 15 Gram(s) Oral once PRN Blood Glucose LESS THAN 70 milliGRAM(s)/deciliter  melatonin 3 milliGRAM(s) Oral at bedtime PRN Insomnia  ondansetron Injectable 4 milliGRAM(s) IV Push every 8 hours PRN Nausea and/or Vomiting      Allergies    Khai (Unknown)  No Known Drug Allergies    Intolerances        Vital Signs Last 24 Hrs  T(C): 37 (11 Apr 2023 08:12), Max: 37.1 (10 Apr 2023 11:30)  T(F): 98.6 (11 Apr 2023 08:12), Max: 98.8 (10 Apr 2023 11:30)  HR: 67 (11 Apr 2023 08:12) (60 - 79)  BP: 129/57 (11 Apr 2023 08:12) (106/55 - 142/66)  BP(mean): --  RR: 16 (11 Apr 2023 08:12) (16 - 17)  SpO2: 99% (11 Apr 2023 08:12) (97% - 99%)    Parameters below as of 11 Apr 2023 08:12  Patient On (Oxygen Delivery Method): nasal cannula  O2 Flow (L/min): 3        PHYSICAL EXAMINATION:    NECK:  Supple. No lymphadenopathy. Jugular venous pressure not elevated. Carotids equal.   HEART:   The cardiac impulse has a normal quality. Reg., Nl S1 and S2.  There are no murmurs, rubs or gallops noted  CHEST:  Chest crackles to auscultation. Normal respiratory effort.  ABDOMEN:  Soft and nontender.   EXTREMITIES:  There is no edema.       LABS:                        8.3    10.35 )-----------( 118      ( 10 Apr 2023 07:49 )             26.6     04-10    135  |  101  |  58<H>  ----------------------------<  155<H>  3.8   |  30  |  4.82<H>    Ca    8.5      10 Apr 2023 07:49  Phos  3.3     04-10    TPro  x   /  Alb  2.9<L>  /  TBili  x   /  DBili  x   /  AST  x   /  ALT  x   /  AlkPhos  x   04-10            Xray Chest 1 View- PORTABLE-Urgent (Xray Chest 1 View- PORTABLE-Urgent .) (04.09.23 @ 13:14) >  IMPRESSION:  Worsening patchy right lower lung opacities which could be   due to pneumonia.    New predominantly horizontal left midlung opacity and increasing also   somewhat horizontal left lower lung opacity, possibly areas of platelike   atelectasis. Infection is not excluded, however.

## 2023-04-11 NOTE — PROGRESS NOTE ADULT - PROVIDER SPECIALTY LIST ADULT
Hospitalist
Infectious Disease
Pulmonology
Hospitalist
Hospitalist
Infectious Disease
Nephrology
Pulmonology
Infectious Disease
Infectious Disease
Nephrology
Pulmonology
Pulmonology

## 2023-04-11 NOTE — PROGRESS NOTE ADULT - ASSESSMENT
66 y/o F presented with weakness     PROBLEMS:    AC hypoxaemic & hypercapneic Respiratory failurew  Multifactorial: RUL consolidation/HCAP  AMADO not on CPAP, likely obesity hypoventilation syndrome,  ESRD missed session of dialysis/increaesd pleural effusions and fluid overload  Macrocytic anemia   Acute metabolic encephalopathy likely secondary to infectious etiology (HCAP) vs. metabolic vs. neurologic   Vomiting possibly secondary to enteritis vs. GERD vs. uremia? 3. Thrombocytopenia   Hyperglycemia secondary to Type 2 DM  Hypoalbuminemia   History of GI bleed, anemia, HTN, Type 2 DM, AMADO, obesity, COPD (on 3 L home O2), acute on chronic respiratory failure, CAD, HLD, pancreatitis, s/p Micra pacemaker, ESRD dialysis (M/W/F), questionable communication deficits at baseline?    PLAN:    Pulmonary better-used pap qhs-on 4lpm nc sat 96%-titrate- CXR today-decd planning  BIPAP 16/12-RR 15- fio2-ABG - ( 04 Apr 2023 18:20 )pH, Arterial: 7.20  pH, Blood: x     /  pCO2: 67    /  pO2: 84    / HCO3: 26    / Base Excess: -3.3  /  SaO2: 98.0    Change to AVAPS-30/15/8/400/40%-ABG in am-ABG - ( 05 Apr 2023 08:26 ) pH: 7.17  /  pCO2: 53    /  pO2: 84    / HCO3: 19    / Base Excess: -9.5  /  SaO2: 98      IV Cefepime   decd [id number] to po prednisone  S/p HD today, c/w M/W/F schedule   D/W hospitalist/d/w staff  DVT prophylasix     68 y/o F presented with weakness     PROBLEMS:    AC hypoxaemic & hypercapneic Respiratory failure  Multifactorial: RUL consolidation/HCAP  AMADO not on CPAP, likely obesity hypoventilation syndrome,  ESRD missed session of dialysis/increaesd pleural effusions and fluid overload  Macrocytic anemia   Acute metabolic encephalopathy likely secondary to infectious etiology (HCAP) vs. metabolic vs. neurologic   Vomiting possibly secondary to enteritis vs. GERD vs. uremia? 3. Thrombocytopenia   Hyperglycemia secondary to Type 2 DM  Hypoalbuminemia   History of GI bleed, anemia, HTN, Type 2 DM, AMADO, obesity, COPD (on 3 L home O2), acute on chronic respiratory failure, CAD, HLD, pancreatitis, s/p Micra pacemaker, ESRD dialysis (M/W/F), questionable communication deficits at baseline?    PLAN:    Pulmonary better-decd planning  BIPAP 16/12-RR 15- fio2-ABG - ( 04 Apr 2023 18:20 )pH, Arterial: 7.20  pH, Blood: x     /  pCO2: 67    /  pO2: 84    / HCO3: 26    / Base Excess: -3.3  /  SaO2: 98.0    Change to AVAPS-30/15/8/400/40%-ABG in am-ABG - ( 05 Apr 2023 08:26 ) pH: 7.17  /  pCO2: 53    /  pO2: 84    / HCO3: 19    / Base Excess: -9.5  /  SaO2: 98      Off Abx  Po prednisone 40mg daily  S/p HD M/W/F schedule   D/W hospitalist/d/w staff  DVT prophylasix

## 2023-04-11 NOTE — PROGRESS NOTE ADULT - REASON FOR ADMISSION
Weakness

## 2023-04-14 DIAGNOSIS — N18.6 END STAGE RENAL DISEASE: ICD-10-CM

## 2023-04-14 DIAGNOSIS — K86.1 OTHER CHRONIC PANCREATITIS: ICD-10-CM

## 2023-04-14 DIAGNOSIS — J15.6 PNEUMONIA DUE TO OTHER GRAM-NEGATIVE BACTERIA: ICD-10-CM

## 2023-04-14 DIAGNOSIS — E66.2 MORBID (SEVERE) OBESITY WITH ALVEOLAR HYPOVENTILATION: ICD-10-CM

## 2023-04-14 DIAGNOSIS — I13.2 HYPERTENSIVE HEART AND CHRONIC KIDNEY DISEASE WITH HEART FAILURE AND WITH STAGE 5 CHRONIC KIDNEY DISEASE, OR END STAGE RENAL DISEASE: ICD-10-CM

## 2023-04-14 DIAGNOSIS — K52.9 NONINFECTIVE GASTROENTERITIS AND COLITIS, UNSPECIFIED: ICD-10-CM

## 2023-04-14 DIAGNOSIS — Z99.81 DEPENDENCE ON SUPPLEMENTAL OXYGEN: ICD-10-CM

## 2023-04-14 DIAGNOSIS — E11.22 TYPE 2 DIABETES MELLITUS WITH DIABETIC CHRONIC KIDNEY DISEASE: ICD-10-CM

## 2023-04-14 DIAGNOSIS — G47.33 OBSTRUCTIVE SLEEP APNEA (ADULT) (PEDIATRIC): ICD-10-CM

## 2023-04-14 DIAGNOSIS — D63.1 ANEMIA IN CHRONIC KIDNEY DISEASE: ICD-10-CM

## 2023-04-14 DIAGNOSIS — E78.5 HYPERLIPIDEMIA, UNSPECIFIED: ICD-10-CM

## 2023-04-14 DIAGNOSIS — Z99.2 DEPENDENCE ON RENAL DIALYSIS: ICD-10-CM

## 2023-04-14 DIAGNOSIS — E11.65 TYPE 2 DIABETES MELLITUS WITH HYPERGLYCEMIA: ICD-10-CM

## 2023-04-14 DIAGNOSIS — D53.9 NUTRITIONAL ANEMIA, UNSPECIFIED: ICD-10-CM

## 2023-04-14 DIAGNOSIS — I25.10 ATHEROSCLEROTIC HEART DISEASE OF NATIVE CORONARY ARTERY WITHOUT ANGINA PECTORIS: ICD-10-CM

## 2023-04-14 DIAGNOSIS — Z95.0 PRESENCE OF CARDIAC PACEMAKER: ICD-10-CM

## 2023-04-14 DIAGNOSIS — J96.22 ACUTE AND CHRONIC RESPIRATORY FAILURE WITH HYPERCAPNIA: ICD-10-CM

## 2023-04-14 DIAGNOSIS — I50.9 HEART FAILURE, UNSPECIFIED: ICD-10-CM

## 2023-04-14 DIAGNOSIS — Z87.891 PERSONAL HISTORY OF NICOTINE DEPENDENCE: ICD-10-CM

## 2023-04-14 DIAGNOSIS — J44.0 CHRONIC OBSTRUCTIVE PULMONARY DISEASE WITH (ACUTE) LOWER RESPIRATORY INFECTION: ICD-10-CM

## 2023-04-14 DIAGNOSIS — E88.09 OTHER DISORDERS OF PLASMA-PROTEIN METABOLISM, NOT ELSEWHERE CLASSIFIED: ICD-10-CM

## 2023-04-14 DIAGNOSIS — J96.21 ACUTE AND CHRONIC RESPIRATORY FAILURE WITH HYPOXIA: ICD-10-CM

## 2023-04-14 DIAGNOSIS — D69.6 THROMBOCYTOPENIA, UNSPECIFIED: ICD-10-CM

## 2023-04-14 DIAGNOSIS — G93.41 METABOLIC ENCEPHALOPATHY: ICD-10-CM

## 2023-04-14 DIAGNOSIS — E44.0 MODERATE PROTEIN-CALORIE MALNUTRITION: ICD-10-CM

## 2023-07-12 ENCOUNTER — TRANSCRIPTION ENCOUNTER (OUTPATIENT)
Age: 68
End: 2023-07-12

## 2023-07-28 NOTE — ED PROVIDER NOTE - EYES, MLM
Clear bilaterally, pupils equal, round and reactive to light. Clear bilaterally, pupils equal, round and reactive to light. EOMI. Visual fields intact x 4 quadrants. Xeljanz Counseling: I discussed with the patient the risks of Xeljanz therapy including increased risk of infection, liver issues, headache, diarrhea, or cold symptoms. Live vaccines should be avoided. They were instructed to call if they have any problems.

## 2023-09-01 ENCOUNTER — TRANSCRIPTION ENCOUNTER (OUTPATIENT)
Age: 68
End: 2023-09-01

## 2023-09-07 ENCOUNTER — TRANSCRIPTION ENCOUNTER (OUTPATIENT)
Age: 68
End: 2023-09-07

## 2023-09-15 ENCOUNTER — TRANSCRIPTION ENCOUNTER (OUTPATIENT)
Age: 68
End: 2023-09-15

## 2023-09-18 ENCOUNTER — TRANSCRIPTION ENCOUNTER (OUTPATIENT)
Age: 68
End: 2023-09-18

## 2024-01-18 NOTE — DIETITIAN INITIAL EVALUATION ADULT - NSICDXPASTMEDICALHX_GEN_ALL_CORE_FT
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     Presenting Problem  Solitary pulmonary nodule [R91.1]  Non-small cell lung cancer [C34.90]     History of Present Illness  Bernadette Perez is a 68 y.o. female who presented with Ms. Perez is a very pleasant 68-year-old lady who is a never smoker.  She has a right lower lobe pulmonary nodule that underwent a CT-guided biopsy back in March 2023.  She Dioni presented for an interval CT scan of the chest and this nodule had grown from approximately 1.2 cm in size to 1.5 cm in size.  Going back to 2017 this nodule was 7 mm in size so over the last several years it has grown significantly in size.  We most recently underwent an Ion bronchoscopy and biopsy, I am not confident with the results of these biopsies as it was technically very challenging for an Ion bronchoscopy and the final pathology was nondiagnostic.  She presents today to discuss steps moving forward.  She denies any respiratory symptoms.  She denies any fevers chills and or cough.  She denies any hemoptysis.  Due to the enlarging lesion, she presented to [facility] on 1/15/2024 for a planned right lower lobe wedge resection with possible completion lobectomy.    Hospital Course  Ms. Perez is a 68-year-old lady who is a never smoker who presented to [facility] for a planned right robotic assisted lower lobe wedge resection with possible completion lobectomy.  She underwent this operation on 1/15/2024.  Intraoperatively this was diagnosed as mucinous adenocarcinoma.  She had appropriate lung  function for anatomic lung resection.  She underwent completion lobectomy with mediastinal lymph node dissection.  The operation went without issue.  She was transferred to the telemetry unit postop night 0.  She was on room air.  She never demonstrated an air leak.  Her chest tube was kept due to a mild to moderate amount of output greater than 500 cc.  She received 1 dose of Lasix and is precipitously dropped.  Ultimately her chest tube was removed on postop day 4 without issue.  While she was in the hospital, she broke out in a severe rash in and around the area of our operative field.  This is likely either due to the chlorhexidine prep intraoperatively or the  Ioban that is used to placed over the skin prior to her incisions.  She was treated with steroid cream and Benadryl which helped drastically.  This rash was self-limiting and did not spread outside of this area.  Otherwise she had a benign hospital course.  Her pain was controlled with oral pain medication and she was deemed fit for discharge on 1/18/2024.  She was evaluated at nighttime and her oxygen saturations dropped.  She needs to go home on home   At nighttime.    Procedures Performed  Procedure(s):  Flexible Bronchoscopy.    Robotic assisted right lower lobe wedge resection  Robot assisted right lower Lobectomy.   Systematic Mediastinal Lymph node dissection.   Intercostal Nerve Block.   Cryo nerve ablation to interspaces 5, 6, 7, and 8    Consults:   Consults       No orders found from 12/17/2023 to 1/16/2024.            Pertinent Test Results:     Imaging Results (Last 24 Hours)       Procedure Component Value Units Date/Time    XR Chest 1 View [[id number]] Collected: 01/18/24 0715     Updated: 01/18/24 0739    Narrative:      PORTABLE CHEST X-RAY     HISTORY: Chest tube management. Postop right lower lobectomy.     TECHNIQUE: Portable chest x-ray correlated with chest x-ray from  yesterday morning.     FINDINGS: Right chest tube remains in  place. There is a small amount of  gas in the body wall. No pneumothorax. Parenchymal density at the right  lung base is more pronounced, favored to represent atelectasis given its  waxing and waning appearance over the past several days. The left lung  is clear.       Impression:      Postoperative change in the right hemithorax with indwelling  chest tube and mild atelectasis at the right base. No pneumothorax.     This report was finalized on 1/18/2024 7:36 AM by Dr. Omid Sheppard M.D on Workstation: [id number]               Lab Results (last 24 hours)       Procedure Component Value Units Date/Time    Tissue Pathology Exam [[id number]] Collected: 01/15/24 1250    Specimen: Tissue from Lung, Right Lower Lobe; Tissue from Lymph Node; Tissue from Lymph Node; Tissue from Lung, Right Lower Lobe; Tissue from Lymph Node Updated: 01/18/24 1050     Case Report --     Surgical Pathology Report                         Case: [id number]                                  Authorizing Provider:  Robinson Epperson MD PhD   Collected:           01/15/2024 12:50 PM          Ordering Location:     [facility]  Received:            01/15/2024 01:03 PM                                 MAIN OR                                                                      Pathologist:           Jalyn Elizabeth MD                                                    Specimens:   1) - Lung, Right Lower Lobe, Right lower lobe wedge for frozen OR 23 call #[id number]                     2) - Lymph Node, Station 7                                                                          3) - Lymph Node, 10 R                                                                               4) - Lung, Right Lower Lobe, Right lower lobe for permanent                                         5) - Lymph Node, 11 R                                                                       Final Diagnosis --     1.  Lung, right lower lobe, wedge resection  (5 g): INVASIVE WELL TO MODERATELY DIFFERENTIATED MUCINOUS ADENOCARCINOMA OF PULMONARY ORIGIN.   -Tumor size: 1.1 x 1.0 x 0.8 cm.   -Visceral pleura invasion: Not identified.   -Tumor comes to within 1.5 mm of the closest parenchymal margin (in specimen 1).   -See synoptic template.    2.  Lymph node, station 7, excision:   -2 benign anthracotic lymph nodes (0/2).    3.  Lymph node, 10 R, excision:   -Benign anthracotic lymph node (0/1).    4.  Lung, right lower lobe, completion lobectomy (142 g):   -Benign alveolated lung parenchyma with congestion and atelectasis.   -Benign unremarkable bronchial and vascular margins.   -4 benign anthracotic lymph nodes (0/4).   -See synoptic template.    5.  Lymph node, 11 R, excision:   -Benign anthracotic lymph node with hyalinized granuloma (0/1).   -Negative for fungal forms by GMS special stain.    [facility]       Synoptic Checklist --     LUNG  LUNG - All Specimens  8th Edition - Protocol posted: 9/21/2022    SPECIMEN     Procedure:    Lobectomy      Specimen Laterality:    Right     TUMOR     Tumor Focality:    Single focus      Tumor Site:    Lower lobe of lung      Tumor Size:           Total Tumor Size (size of entire tumor):    Greatest Dimension (Centimeters): 1.1 cm     Histologic Type:    Invasive mucinous adenocarcinoma      Visceral Pleura Invasion:    Not identified      Direct Invasion of Adjacent Structures:    Not applicable (no adjacent structures present)      Treatment Effect:    No known presurgical therapy      Lymphovascular Invasion:    Not identified     MARGINS     Margin Status for Invasive Carcinoma:    All margins negative for invasive carcinoma        Closest Margin(s) to Invasive Carcinoma:    Bronchial        Distance from Invasive Carcinoma to Closest Margin:    1.65 cm     Margin Status for Non-Invasive Tumor:    All margins negative for non-invasive tumor     REGIONAL LYMPH NODES     Lymph Node(s) from Prior Procedures:    No known prior lymph node  "sampling performed      Regional Lymph Node Status:           :    All regional lymph nodes negative for tumor        Number of Lymph Nodes Examined:    8          Ruben Site(s) Examined:    10R: Hilar          Ruben Site(s) Examined:    11R: Interlobar          Ruben Site(s) Examined:    7: Subcarinal     PATHOLOGIC STAGE CLASSIFICATION (pTNM, AJCC 8th Edition)     Reporting of pT, pN, and (when applicable) pM categories is based on information available to the pathologist at the time the report is issued. As per the AJCC (Chapter 1, 8th Ed.) it is the managing physician’s responsibility to establish the final pathologic stage based upon all pertinent information, including but potentially not limited to this pathology report.     pT Category:    pT1b      pN Category:    pN0        Comment --     PD-L1 studies will be performed and issued in an addendum to this report.       Intraoperative Consultation --     Part 1 (right lower lobe wedge)-1 frozen block submitted.  Frozen section diagnosis-non-small cell carcinoma, mucinous carcinoma within less than a millimeter of the margin.  Reported to Dr. Epperson at 1:18 PM per Dr. Avila.       Gross Description --     1. Lung, Right Lower Lobe.  Received fresh for frozen section diagnosis, labeled \"right lower lobe wedge\" is a 5 g, 6.5 x 2.0 x 1.5 cm lung wedge resection with a 6.5 cm long staple line margin.  The pleural surface displays a 1 x 1 cm umbilication.  The pleural surface is inked blue and the staple line margin is inked orange.  The specimen is serially sectioned to reveal subjacent to the umbilication is a 1.4 x 1.2 x 1.1 cm solid tan-white nodule which is at the pleural surface and comes to within 0.1 cm of the parenchymal staple line margin.  A representative section of the nodule is submitted to include the parenchymal staple line margin and the pleural surface, for frozen section diagnosis and the remainder the specimen is submitted in its entirety as " "follows:    1 AFS- frozen section residue-mass to pleural surface and parenchymal resection margin  1B- remainder of the mass to include pleural surface and parenchymal resection margin  1C-1D- remainder of the lung wedge    jap/uso/hyun  2. Lymph Node.  Received in formalin labeled \"station 7 lymph node \"is a 1.5 x 0.5 x 0.4 cm irregular anthracotic lymph node which is submitted in toto as 2A.    jap/uso/swm        3. Lymph Node.  Received in formalin labeled \"10 R lymph node \"is a 1.5 x 0.8 x 0.6 cm irregular anthracotic lymph node which is bisected and entirely submitted as 3A.    jap/uso/swm        4. Lung, Right Lower Lobe.  Received in formalin labeled \"right lower lobe\" is a 142 g, 12.0 x 9.0 x 4.5 cm lung lobectomy specimen with a smooth to shaggy tan-red pleural surface and a 6.5 cm long staple line consistent with a prior wedge resection.  The staple line\wedge resection site comes to within 2 cm of the nearest vascular margin, 1.5 cm of the bronchus margin and 3.5 cm of the hilar parenchymal staple line margin.  The vascular margins average 1 cm in length, the bronchus measures 1 cm in length and the hilar parenchymal staple line margin measures 3.5 cm in length.  The hilar margins are inked orange.  Sectioning reveals spongy red parenchyma with no residual tumor grossly identified.  Sectioning along the bronchial tree reveals 3 anthracotic lymph nodes ranging from 0.3 x 0.3 x 0.2 cm to 0.6 x 0.4 x 0.3 cm.  Representative sections are submitted as follows:    4A-4B- tissue subjacent to the wedge resection staple line  4C- 3 lymph nodes in toto  4D- bronchial and vascular margins en face  4E- hilar parenchymal staple line margin en face  4F- random normal parenchyma    jap/uso/swm  5. Lymph Node.  Received in formalin labeled \"11 R lymph node \"is a 0.5 x 0.4 x 0.2 cm irregular anthracotic lymph node which is submitted in toto as 5A.    jap/uso/swm             Special Stains --     Utilizing appropriate " controls, multiple immunohistochemical and special stains are performed including CDX2, CK7, CK20, TTF-1, Napsin, elastic and GMS.  Tumor cells are positive for CK7, TTF1 and Naspin and negative for CDX2, and CK20 supporting lung origin.  Elastic stain is performed on block 1B and fails to highlight any definitive pleural invasion.  GMS stain is performed on specimen 5 and is negative in the hyalinized granuloma.                Condition on Discharge: Stable    Vital Signs  Temp:  [97.6 °F (36.4 °C)-98.9 °F (37.2 °C)] 98.5 °F (36.9 °C)  Heart Rate:  [66-96] 96  Resp:  [16-20] 16  BP: ()/(46-71) 90/71    Physical Exam:    General Appearance:  Alert, cooperative, in no acute distress   Head:  Normocephalic, without obvious abnormality, atraumatic   Eyes:  Lids and lashes normal, conjunctivae and sclerae normal, no icterus, no pallor, corneas clear, PERRLA   Ears:  Ears appear intact with no abnormalities noted   Throat:  No oral lesions, no thrush, oral mucosa moist   Neck:  No adenopathy, supple, trachea midline, no thyromegaly, no carotid bruit, no JVD   Back:  No kyphosis present, no scoliosis present, no skin lesions, erythema or scars, no tenderness to percussion, or palpation, range of motion normal   Lungs:  Clear to auscultation,respirations regular, even and unlabored    Heart:  Regular rhythm and normal rate, normal S1 and S2, no murmur, no gallop, no rub, no click   Breast Exam:  Deferred   Abdomen:  Normal bowel sounds, no masses, no organomegaly, soft non-tender, non-distended, no guarding, no rebound tenderness   Genitalia:  Deferred   Extremities:  Moves all extremities well, no edema, no cyanosis, no redness   Pulses:  Pulses palpable and equal bilaterally   Skin:  Very large stable rash in and around the operative field.  This has been present since postoperative day 1.  It has not spread.  It is itchy and appears to be contact dermatitis due to either the skin prep intraoperatively or the  Ioband intraoperatively placed on the skin.   Lymph nodes:  No palpable adenopathy   Neurologic:  Cranial nerves 2 - 12 grossly intact, sensation intact, DTR present and equal bilaterally       Discharge Disposition  Home today    Discharge Medications     Discharge Medications        New Medications        Instructions Start Date   Acetaminophen Extra Strength 500 MG tablet   1,000 mg, Oral, 3 Times Daily      Allergy 25 MG capsule  Generic drug: diphenhydrAMINE   25 mg, Oral, Every 8 Hours PRN      diphenhydrAMINE-zinc acetate 2-0.1 % cream   1 application , Topical, 3 Times Daily PRN      gabapentin 300 MG capsule  Commonly known as: NEURONTIN   300 mg, Oral, Every 8 Hours Scheduled      oxyCODONE 5 MG immediate release tablet  Commonly known as: ROXICODONE   Take 1 tablet by mouth Every 4 (Four) Hours As Needed for Severe Pain.             Changes to Medications        Instructions Start Date   atorvastatin 20 MG tablet  Commonly known as: LIPITOR  What changed: when to take this   TAKE 1 TABLET BY MOUTH EVERY DAY      triamcinolone 0.1 % cream  Commonly known as: KENALOG  What changed: See the new instructions.   APPLY TOPICALLY TO THE APPROPRIATE AREA TWICE A DAY AS DIRECTED             Continue These Medications        Instructions Start Date   Azelastine HCl 137 MCG/SPRAY solution   2 sprays into the nostril(s) as directed by provider As Needed.      Flarex 0.1 % ophthalmic suspension  Generic drug: fluorometholone   1 drop, Right Eye, Every 24 Hours      fluticasone 50 MCG/ACT nasal spray  Commonly known as: FLONASE   1 spray, Nasal, Daily PRN, Administer 2 sprays in each nostril for each dose.       hydrOXYzine 25 MG tablet  Commonly known as: ATARAX   25 mg, Oral, 3 Times Daily PRN      losartan 50 MG tablet  Commonly known as: COZAAR   TAKE 1 TABLET BY MOUTH EVERY DAY      PARoxetine 20 MG tablet  Commonly known as: PAXIL   20 mg, Oral, Every Morning               Discharge Instructions:  No heavy  lifting, pushing, pulling greater than 10 pounds.  No driving up until 2 weeks after surgery and no longer taking narcotics.  Resume home diet as tolerated.  Continue incentive spirometer at least 4 times per day.  Remove dressing from post chest tube site after 48 hours, may shower and clean surgical sites with antibacterial soap or hydrogen peroxide, and apply gauze dressing or band-aid as needed for any drainage.  No dressing needed once no longer draining.          Follow-up Appointments  Future Appointments   Date Time Provider Department Center   1/31/2024 10:00 AM Robinson Epperson MD PhD [facility]   6/5/2024  7:30 AM Dereje Alvarez MD [facility]   11/12/2024  7:30 AM Dereje Alvarez MD [facility]     Additional Instructions for the Follow-ups that You Need to Schedule       XR Chest 2 View    Jan 31, 2024 (Approximate)      Exam reason: 2 week post-op   Release to patient: Routine Release                Test Results Pending at Discharge      For any questions regarding patient's stay, please refer to patient's chart.    Robinson Epperson MD PhD  [facility]  01/18/24  12:42 EST           PAST MEDICAL HISTORY:  Anemia     Asthma with chronic obstructive pulmonary disease (COPD)     CAD (coronary artery disease)     CHF (congestive heart failure)     Diabetes     ESRD on dialysis     HLD (hyperlipidemia)     HTN (hypertension)     Lower GI bleed     Obesity     On home oxygen therapy     AMADO (obstructive sleep apnea)     Pancreatitis

## 2024-02-28 NOTE — H&P ADULT - NSHPPOAURINARYCATHETER_GEN_ALL_CORE
0000: Pt's B/P- 171/81, heart rate- 66, pt sleeping in bed. This RN notified Dr. Rodgers, no new orders at this time, will continue to monitor pt.    no

## 2024-04-02 ENCOUNTER — OFFICE (OUTPATIENT)
Dept: URBAN - METROPOLITAN AREA CLINIC 103 | Facility: CLINIC | Age: 69
Setting detail: OPHTHALMOLOGY
End: 2024-04-02
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
## 2024-04-02 PROCEDURE — NO SHOW FE NO SHOW FEE: Performed by: OPHTHALMOLOGY

## 2024-07-16 NOTE — H&P ADULT - NSHPSOURCEINFORD_GEN_ALL_CORE
Goal to consume 500 to 1,000 fewer calories per day for a 1-2 lbs weight loss per week. Increase exercise to 30 min, 5 times a week as tolerated for a goal of 150 mins a week. Calorie tracking apps such as myfitness pal can be helpful for keeping track of calorie intake. Decrease simple carbohydrates (white bread, pasta, white rice), and increasing vegetables, fruit, and protein.  Increase water.     Chart(s)/Patient

## 2024-08-15 NOTE — ED PROVIDER NOTE - TEMPLATE

## 2024-12-11 NOTE — DISCHARGE NOTE NURSING/CASE MANAGEMENT/SOCIAL WORK - NURSING SECTION COMPLETE
Patient/Caregiver provided printed discharge information. PAST MEDICAL HISTORY:  Urinary tract infection

## 2025-01-02 NOTE — ED PROVIDER NOTE - CPE EDP [facility] NORM
Patient wearing 30 day event monitor for diagnosis palpitations     Received auto-triggered alert notification for 2nd degree AVB- [place] with 2:1 conduction on January 2, 2025 at 6: 07 AM.      Called patient to assess for symptoms: LVM to assess if patient was sleeping at the time of event or if awake- if he was symptomatic. Would also like to obtain typical sleep hours from patient.     Strips  linked to this this encounter  or can be found in the media tab for review. Will continue to monitor until 1/11/2025.      
Spoke with patient who states was likely sleeping but woke up soon after that time. Denies symptoms when awoken. Nocturnal event- non actionable. Will continue to monitor.   
normal...

## 2025-05-15 NOTE — ED PROVIDER NOTE - EYES, MLM
Tai from PT called to report the patient had a fall last night with no inujuries   Clear bilaterally, pupils equal, round and reactive to light.

## 2025-06-10 NOTE — ED ADULT NURSE NOTE - NS ED NOTE ABUSE SUSPICION NEGLECT YN
[de-identified] : Programmed Baha according to the recent hearing test, ran feedback manager. Pt reported good fit and sound quality with device. Will return as needed for HAC.  No

## 2025-06-23 NOTE — PROGRESS NOTE ADULT - NS ATTEND AMEND GEN_ALL_CORE FT
67 year old dialysis patient with GI bleeding, likely diverticular.     IR vs surgery if has rebleeding, massive bleeding.   As renal failure/dialysis, can give DDAVP if rebleeds to help activate her platelets.
diverticular bleed  acute blood loss anemia   COPD with acute hypercapnic resp failure  ESRD on HD  On home O2    may need colonoscopy   PRBCs   dialysis per renal   qhs bipap  dvt ppx
none